# Patient Record
Sex: MALE | Race: WHITE | Employment: OTHER | URBAN - METROPOLITAN AREA
[De-identification: names, ages, dates, MRNs, and addresses within clinical notes are randomized per-mention and may not be internally consistent; named-entity substitution may affect disease eponyms.]

---

## 2017-01-01 ENCOUNTER — ANTI-COAG VISIT (OUTPATIENT)
Dept: PHARMACY | Facility: CLINIC | Age: 82
End: 2017-01-01

## 2017-01-17 ENCOUNTER — OFFICE VISIT (OUTPATIENT)
Dept: ORTHOPEDIC SURGERY | Age: 82
End: 2017-01-17

## 2017-01-17 VITALS — WEIGHT: 225 LBS | BODY MASS INDEX: 33.33 KG/M2 | HEIGHT: 69 IN

## 2017-01-17 DIAGNOSIS — L84 CALLUS OF FOOT: ICD-10-CM

## 2017-01-17 DIAGNOSIS — M79.671 RIGHT FOOT PAIN: Primary | ICD-10-CM

## 2017-01-17 PROCEDURE — G8419 CALC BMI OUT NRM PARAM NOF/U: HCPCS | Performed by: ORTHOPAEDIC SURGERY

## 2017-01-17 PROCEDURE — 99214 OFFICE O/P EST MOD 30 MIN: CPT | Performed by: ORTHOPAEDIC SURGERY

## 2017-01-17 PROCEDURE — 1123F ACP DISCUSS/DSCN MKR DOCD: CPT | Performed by: ORTHOPAEDIC SURGERY

## 2017-01-17 PROCEDURE — G8427 DOCREV CUR MEDS BY ELIG CLIN: HCPCS | Performed by: ORTHOPAEDIC SURGERY

## 2017-01-17 PROCEDURE — 4040F PNEUMOC VAC/ADMIN/RCVD: CPT | Performed by: ORTHOPAEDIC SURGERY

## 2017-01-17 PROCEDURE — 4004F PT TOBACCO SCREEN RCVD TLK: CPT | Performed by: ORTHOPAEDIC SURGERY

## 2017-01-17 PROCEDURE — G8484 FLU IMMUNIZE NO ADMIN: HCPCS | Performed by: ORTHOPAEDIC SURGERY

## 2017-01-23 ENCOUNTER — TELEPHONE (OUTPATIENT)
Dept: ORTHOPEDIC SURGERY | Age: 82
End: 2017-01-23

## 2017-01-24 ENCOUNTER — ORTHOTIC/BRACE ENCOUNTER (OUTPATIENT)
Dept: ORTHOPEDIC SURGERY | Age: 82
End: 2017-01-24

## 2017-02-01 ENCOUNTER — TELEPHONE (OUTPATIENT)
Dept: ORTHOPEDIC SURGERY | Age: 82
End: 2017-02-01

## 2017-02-07 ENCOUNTER — ORTHOTIC/BRACE ENCOUNTER (OUTPATIENT)
Dept: ORTHOPEDIC SURGERY | Age: 82
End: 2017-02-07

## 2017-02-07 DIAGNOSIS — E11.8 TYPE 2 DIABETES MELLITUS WITH COMPLICATION, WITHOUT LONG-TERM CURRENT USE OF INSULIN (HCC): Primary | ICD-10-CM

## 2017-02-07 PROCEDURE — A5500 DIAB SHOE FOR DENSITY INSERT: HCPCS | Performed by: PEDORTHIST

## 2017-02-07 PROCEDURE — A5513 MULTI DEN INSERT CUSTOM MOLD: HCPCS | Performed by: PEDORTHIST

## 2017-02-08 ENCOUNTER — HOSPITAL ENCOUNTER (OUTPATIENT)
Dept: SURGERY | Age: 82
Discharge: HOME | End: 2017-02-08
Attending: UROLOGY | Admitting: UROLOGY

## 2017-02-08 VITALS
WEIGHT: 225 LBS | HEIGHT: 69 IN | DIASTOLIC BLOOD PRESSURE: 67 MMHG | HEART RATE: 61 BPM | BODY MASS INDEX: 33.33 KG/M2 | RESPIRATION RATE: 20 BRPM | OXYGEN SATURATION: 96 % | SYSTOLIC BLOOD PRESSURE: 150 MMHG | TEMPERATURE: 97.8 F

## 2017-02-08 LAB
ALBUMIN SERPL-MCNC: 3.8 G/DL (ref 3.4–5)
ANION GAP SERPL CALCULATED.3IONS-SCNC: 12 MMOL/L (ref 3–16)
ANION GAP SERPL CALCULATED.3IONS-SCNC: 13 MMOL/L (ref 3–16)
BUN BLDV-MCNC: 28 MG/DL (ref 7–20)
BUN BLDV-MCNC: 29 MG/DL (ref 7–20)
CALCIUM SERPL-MCNC: 9.1 MG/DL (ref 8.3–10.6)
CALCIUM SERPL-MCNC: 9.5 MG/DL (ref 8.3–10.6)
CHLORIDE BLD-SCNC: 102 MMOL/L (ref 99–110)
CHLORIDE BLD-SCNC: 102 MMOL/L (ref 99–110)
CO2: 23 MMOL/L (ref 21–32)
CO2: 24 MMOL/L (ref 21–32)
CREAT SERPL-MCNC: 2.2 MG/DL (ref 0.8–1.3)
CREAT SERPL-MCNC: 2.2 MG/DL (ref 0.8–1.3)
GFR AFRICAN AMERICAN: 35
GFR AFRICAN AMERICAN: 35
GFR NON-AFRICAN AMERICAN: 29
GFR NON-AFRICAN AMERICAN: 29
GLUCOSE BLD-MCNC: 110 MG/DL (ref 70–99)
GLUCOSE BLD-MCNC: 118 MG/DL (ref 70–99)
GLUCOSE BLD-MCNC: 147 MG/DL (ref 70–99)
GLUCOSE BLD-MCNC: 52 MG/DL (ref 70–99)
HCT VFR BLD CALC: 32.4 % (ref 40.5–52.5)
HCT VFR BLD CALC: 34.9 % (ref 40.5–52.5)
HEMOGLOBIN: 10.5 G/DL (ref 13.5–17.5)
HEMOGLOBIN: 11.3 G/DL (ref 13.5–17.5)
MCH RBC QN AUTO: 28.6 PG (ref 26–34)
MCH RBC QN AUTO: 29.1 PG (ref 26–34)
MCHC RBC AUTO-ENTMCNC: 32.2 G/DL (ref 31–36)
MCHC RBC AUTO-ENTMCNC: 32.5 G/DL (ref 31–36)
MCV RBC AUTO: 88.7 FL (ref 80–100)
MCV RBC AUTO: 89.7 FL (ref 80–100)
PDW BLD-RTO: 16.6 % (ref 12.4–15.4)
PDW BLD-RTO: 16.7 % (ref 12.4–15.4)
PERFORMED ON: ABNORMAL
PERFORMED ON: ABNORMAL
PHOSPHORUS: 4 MG/DL (ref 2.5–4.9)
PLATELET # BLD: 109 K/UL (ref 135–450)
PLATELET # BLD: 91 K/UL (ref 135–450)
PMV BLD AUTO: 7.8 FL (ref 5–10.5)
PMV BLD AUTO: 7.9 FL (ref 5–10.5)
POTASSIUM SERPL-SCNC: 3.1 MMOL/L (ref 3.5–5.1)
POTASSIUM SERPL-SCNC: 4.2 MMOL/L (ref 3.5–5.1)
RBC # BLD: 3.61 M/UL (ref 4.2–5.9)
RBC # BLD: 3.94 M/UL (ref 4.2–5.9)
SODIUM BLD-SCNC: 137 MMOL/L (ref 136–145)
SODIUM BLD-SCNC: 139 MMOL/L (ref 136–145)
WBC # BLD: 12.6 K/UL (ref 4–11)
WBC # BLD: 9.3 K/UL (ref 4–11)

## 2017-02-08 PROCEDURE — 93010 ELECTROCARDIOGRAM REPORT: CPT | Performed by: INTERNAL MEDICINE

## 2017-02-08 RX ORDER — PHENAZOPYRIDINE HYDROCHLORIDE 200 MG/1
200 TABLET, FILM COATED ORAL 3 TIMES DAILY PRN
Qty: 15 TABLET | Refills: 0 | Status: SHIPPED | OUTPATIENT
Start: 2017-02-08 | End: 2017-02-13

## 2017-02-08 RX ORDER — SODIUM CHLORIDE, SODIUM LACTATE, POTASSIUM CHLORIDE, CALCIUM CHLORIDE 600; 310; 30; 20 MG/100ML; MG/100ML; MG/100ML; MG/100ML
INJECTION, SOLUTION INTRAVENOUS CONTINUOUS
Status: DISCONTINUED | OUTPATIENT
Start: 2017-02-08 | End: 2017-02-09 | Stop reason: HOSPADM

## 2017-02-08 RX ORDER — GENTAMICIN SULFATE 80 MG/100ML
80 INJECTION, SOLUTION INTRAVENOUS ONCE
Status: DISCONTINUED | OUTPATIENT
Start: 2017-02-08 | End: 2017-02-09 | Stop reason: HOSPADM

## 2017-02-08 RX ORDER — DEXTROSE MONOHYDRATE 25 G/50ML
INJECTION, SOLUTION INTRAVENOUS
Status: DISCONTINUED
Start: 2017-02-08 | End: 2017-02-09 | Stop reason: HOSPADM

## 2017-02-08 RX ORDER — SULFAMETHOXAZOLE AND TRIMETHOPRIM 400; 80 MG/1; MG/1
1 TABLET ORAL 2 TIMES DAILY
Qty: 8 TABLET | Refills: 0 | Status: SHIPPED | OUTPATIENT
Start: 2017-02-08 | End: 2017-02-12

## 2017-02-08 RX ORDER — DEXTROSE MONOHYDRATE 25 G/50ML
25 INJECTION, SOLUTION INTRAVENOUS ONCE
Status: COMPLETED | OUTPATIENT
Start: 2017-02-08 | End: 2017-02-08

## 2017-02-08 RX ORDER — LIDOCAINE HYDROCHLORIDE 10 MG/ML
1 INJECTION, SOLUTION EPIDURAL; INFILTRATION; INTRACAUDAL; PERINEURAL
Status: COMPLETED | OUTPATIENT
Start: 2017-02-08 | End: 2017-02-08

## 2017-02-08 RX ADMIN — LIDOCAINE HYDROCHLORIDE 1 ML: 10 INJECTION, SOLUTION EPIDURAL; INFILTRATION; INTRACAUDAL; PERINEURAL at 14:03

## 2017-02-08 RX ADMIN — DEXTROSE MONOHYDRATE 25 G: 25 INJECTION, SOLUTION INTRAVENOUS at 14:16

## 2017-02-08 RX ADMIN — SODIUM CHLORIDE, SODIUM LACTATE, POTASSIUM CHLORIDE, CALCIUM CHLORIDE: 600; 310; 30; 20 INJECTION, SOLUTION INTRAVENOUS at 14:03

## 2017-02-08 ASSESSMENT — PAIN - FUNCTIONAL ASSESSMENT: PAIN_FUNCTIONAL_ASSESSMENT: 0-10

## 2017-02-09 LAB
EKG ATRIAL RATE: 394 BPM
EKG DIAGNOSIS: NORMAL
EKG Q-T INTERVAL: 494 MS
EKG QRS DURATION: 112 MS
EKG QTC CALCULATION (BAZETT): 476 MS
EKG R AXIS: -28 DEGREES
EKG T AXIS: 53 DEGREES
EKG VENTRICULAR RATE: 56 BPM

## 2017-04-03 ENCOUNTER — OFFICE VISIT (OUTPATIENT)
Dept: ORTHOPEDIC SURGERY | Age: 82
End: 2017-04-03

## 2017-04-03 DIAGNOSIS — M17.11 PRIMARY OSTEOARTHRITIS OF RIGHT KNEE: Primary | ICD-10-CM

## 2017-04-03 PROCEDURE — 20611 DRAIN/INJ JOINT/BURSA W/US: CPT | Performed by: PHYSICIAN ASSISTANT

## 2017-04-03 PROCEDURE — 4004F PT TOBACCO SCREEN RCVD TLK: CPT | Performed by: PHYSICIAN ASSISTANT

## 2017-04-04 PROBLEM — I25.10 CAD (CORONARY ARTERY DISEASE): Status: ACTIVE | Noted: 2017-04-04

## 2017-04-04 PROBLEM — I10 HYPERTENSION: Status: ACTIVE | Noted: 2017-04-04

## 2017-04-04 PROBLEM — L03.119 CELLULITIS OF LOWER EXTREMITY: Status: ACTIVE | Noted: 2017-04-04

## 2017-04-04 PROBLEM — R09.89 PULMONARY VENOUS CONGESTION: Status: ACTIVE | Noted: 2017-04-04

## 2017-04-04 PROBLEM — N18.9 CKD (CHRONIC KIDNEY DISEASE): Status: ACTIVE | Noted: 2017-04-04

## 2017-04-04 PROBLEM — R00.1 BRADYCARDIA: Status: ACTIVE | Noted: 2017-04-04

## 2017-04-11 ENCOUNTER — HOSPITAL ENCOUNTER (OUTPATIENT)
Dept: WOUND CARE | Age: 82
Discharge: HOME | End: 2017-04-11
Attending: CLINICAL NURSE SPECIALIST | Admitting: CLINICAL NURSE SPECIALIST

## 2017-04-11 VITALS
SYSTOLIC BLOOD PRESSURE: 178 MMHG | BODY MASS INDEX: 31.92 KG/M2 | HEART RATE: 53 BPM | RESPIRATION RATE: 18 BRPM | HEIGHT: 71 IN | WEIGHT: 228 LBS | DIASTOLIC BLOOD PRESSURE: 64 MMHG | TEMPERATURE: 98.4 F

## 2017-04-11 DIAGNOSIS — L97.929 VENOUS ULCER OF LEFT LEG (HCC): ICD-10-CM

## 2017-04-11 DIAGNOSIS — I83.029 VENOUS ULCER OF LEFT LEG (HCC): ICD-10-CM

## 2017-04-11 DIAGNOSIS — L97.919 VENOUS ULCER OF RIGHT LEG (HCC): ICD-10-CM

## 2017-04-11 DIAGNOSIS — I83.019 VENOUS ULCER OF RIGHT LEG (HCC): ICD-10-CM

## 2017-04-11 PROCEDURE — 99202 OFFICE O/P NEW SF 15 MIN: CPT | Performed by: CLINICAL NURSE SPECIALIST

## 2017-04-15 PROBLEM — I83.019 VENOUS ULCER OF RIGHT LEG (HCC): Status: ACTIVE | Noted: 2017-04-15

## 2017-04-15 PROBLEM — L97.919 VENOUS ULCER OF RIGHT LEG (HCC): Status: ACTIVE | Noted: 2017-04-15

## 2017-04-15 PROBLEM — I83.029 VENOUS ULCER OF LEFT LEG (HCC): Status: ACTIVE | Noted: 2017-04-15

## 2017-04-15 PROBLEM — L97.929 VENOUS ULCER OF LEFT LEG (HCC): Status: ACTIVE | Noted: 2017-04-15

## 2017-04-18 ENCOUNTER — HOSPITAL ENCOUNTER (OUTPATIENT)
Dept: WOUND CARE | Age: 82
Discharge: HOME | End: 2017-04-18
Attending: CLINICAL NURSE SPECIALIST | Admitting: CLINICAL NURSE SPECIALIST

## 2017-04-18 VITALS
WEIGHT: 218.2 LBS | BODY MASS INDEX: 30.55 KG/M2 | TEMPERATURE: 96.8 F | HEART RATE: 58 BPM | SYSTOLIC BLOOD PRESSURE: 158 MMHG | DIASTOLIC BLOOD PRESSURE: 70 MMHG | HEIGHT: 71 IN | RESPIRATION RATE: 20 BRPM

## 2017-04-18 PROCEDURE — 99211 OFF/OP EST MAY X REQ PHY/QHP: CPT | Performed by: CLINICAL NURSE SPECIALIST

## 2017-04-18 ASSESSMENT — PAIN SCALES - GENERAL: PAINLEVEL_OUTOF10: 0

## 2017-05-15 ENCOUNTER — HOSPITAL ENCOUNTER (OUTPATIENT)
Dept: WOUND CARE | Age: 82
Discharge: HOME | End: 2017-05-15
Attending: PODIATRIST | Admitting: PODIATRIST

## 2017-05-15 DIAGNOSIS — I73.9 PERIPHERAL VASCULAR DISEASE (HCC): ICD-10-CM

## 2017-05-22 ENCOUNTER — HOSPITAL ENCOUNTER (OUTPATIENT)
Dept: WOUND CARE | Age: 82
Discharge: HOME | End: 2017-05-22
Attending: PODIATRIST | Admitting: PODIATRIST

## 2017-05-22 VITALS
DIASTOLIC BLOOD PRESSURE: 62 MMHG | RESPIRATION RATE: 16 BRPM | TEMPERATURE: 97.7 F | HEART RATE: 55 BPM | SYSTOLIC BLOOD PRESSURE: 149 MMHG | HEIGHT: 71 IN

## 2017-05-22 DIAGNOSIS — I83.019 VENOUS ULCER OF RIGHT LEG (HCC): ICD-10-CM

## 2017-05-22 DIAGNOSIS — I83.029 VENOUS ULCER OF LEFT LEG (HCC): ICD-10-CM

## 2017-05-22 DIAGNOSIS — L97.919 VENOUS ULCER OF RIGHT LEG (HCC): ICD-10-CM

## 2017-05-22 DIAGNOSIS — I73.9 PERIPHERAL VASCULAR DISEASE (HCC): ICD-10-CM

## 2017-05-22 DIAGNOSIS — L97.929 VENOUS ULCER OF LEFT LEG (HCC): ICD-10-CM

## 2017-05-30 ENCOUNTER — HOSPITAL ENCOUNTER (OUTPATIENT)
Dept: WOUND CARE | Age: 82
Discharge: HOME | End: 2017-05-30
Attending: PODIATRIST | Admitting: PODIATRIST

## 2017-05-30 VITALS
DIASTOLIC BLOOD PRESSURE: 61 MMHG | WEIGHT: 228.5 LBS | HEART RATE: 53 BPM | SYSTOLIC BLOOD PRESSURE: 145 MMHG | TEMPERATURE: 98.7 F | BODY MASS INDEX: 31.99 KG/M2 | RESPIRATION RATE: 16 BRPM | HEIGHT: 71 IN

## 2017-06-05 ENCOUNTER — HOSPITAL ENCOUNTER (OUTPATIENT)
Dept: WOUND CARE | Age: 82
Discharge: HOME | End: 2017-06-05
Attending: PODIATRIST | Admitting: PODIATRIST

## 2017-06-06 ENCOUNTER — HOSPITAL ENCOUNTER (OUTPATIENT)
Dept: WOUND CARE | Age: 82
Discharge: HOME | End: 2017-06-06
Attending: PODIATRIST | Admitting: PODIATRIST

## 2017-06-06 VITALS
HEART RATE: 74 BPM | HEIGHT: 71 IN | BODY MASS INDEX: 31.5 KG/M2 | RESPIRATION RATE: 18 BRPM | SYSTOLIC BLOOD PRESSURE: 154 MMHG | WEIGHT: 225 LBS | TEMPERATURE: 97.4 F | DIASTOLIC BLOOD PRESSURE: 68 MMHG

## 2017-06-06 ASSESSMENT — PAIN DESCRIPTION - DESCRIPTORS: DESCRIPTORS: ACHING

## 2017-06-06 ASSESSMENT — PAIN DESCRIPTION - LOCATION: LOCATION: LEG

## 2017-06-06 ASSESSMENT — PAIN DESCRIPTION - PROGRESSION: CLINICAL_PROGRESSION: NOT CHANGED

## 2017-06-06 ASSESSMENT — PAIN DESCRIPTION - FREQUENCY: FREQUENCY: INTERMITTENT

## 2017-06-06 ASSESSMENT — PAIN SCALES - GENERAL: PAINLEVEL_OUTOF10: 3

## 2017-06-06 ASSESSMENT — PAIN DESCRIPTION - PAIN TYPE: TYPE: CHRONIC PAIN

## 2017-06-06 ASSESSMENT — PAIN DESCRIPTION - ONSET: ONSET: ON-GOING

## 2017-06-06 ASSESSMENT — PAIN DESCRIPTION - ORIENTATION: ORIENTATION: RIGHT

## 2017-06-12 ENCOUNTER — HOSPITAL ENCOUNTER (OUTPATIENT)
Dept: WOUND CARE | Age: 82
Discharge: HOME | End: 2017-06-12
Attending: PODIATRIST | Admitting: PODIATRIST

## 2017-06-12 VITALS
RESPIRATION RATE: 18 BRPM | WEIGHT: 222.2 LBS | TEMPERATURE: 97 F | DIASTOLIC BLOOD PRESSURE: 64 MMHG | HEIGHT: 71 IN | BODY MASS INDEX: 31.11 KG/M2 | HEART RATE: 58 BPM | SYSTOLIC BLOOD PRESSURE: 155 MMHG

## 2017-09-26 PROBLEM — L84 CALLUS OF FOOT: Status: RESOLVED | Noted: 2017-01-17 | Resolved: 2017-09-26

## 2017-09-26 PROBLEM — R79.1 ELEVATED INR: Status: ACTIVE | Noted: 2017-09-26

## 2017-09-26 PROBLEM — N18.30 CKD (CHRONIC KIDNEY DISEASE) STAGE 3, GFR 30-59 ML/MIN (HCC): Chronic | Status: ACTIVE | Noted: 2017-04-04

## 2017-09-26 PROBLEM — Z87.891 FORMER SMOKER: Chronic | Status: ACTIVE | Noted: 2017-09-26

## 2017-09-26 PROBLEM — L03.119 CELLULITIS OF LOWER EXTREMITY: Status: RESOLVED | Noted: 2017-04-04 | Resolved: 2017-09-26

## 2017-09-26 PROBLEM — R79.89 ELEVATED BRAIN NATRIURETIC PEPTIDE (BNP) LEVEL: Status: ACTIVE | Noted: 2017-09-26

## 2017-09-26 PROBLEM — L97.929 VENOUS ULCER OF LEFT LEG (HCC): Status: RESOLVED | Noted: 2017-04-15 | Resolved: 2017-09-26

## 2017-09-26 PROBLEM — R79.1 ELEVATED INR: Chronic | Status: ACTIVE | Noted: 2017-09-26

## 2017-09-26 PROBLEM — N40.0 BPH (BENIGN PROSTATIC HYPERPLASIA): Chronic | Status: ACTIVE | Noted: 2017-09-26

## 2017-09-26 PROBLEM — I83.029 VENOUS ULCER OF LEFT LEG (HCC): Status: RESOLVED | Noted: 2017-04-15 | Resolved: 2017-09-26

## 2017-09-26 PROBLEM — R09.89 PULMONARY VENOUS CONGESTION: Status: RESOLVED | Noted: 2017-04-04 | Resolved: 2017-09-26

## 2017-09-26 PROBLEM — I10 HYPERTENSION: Chronic | Status: ACTIVE | Noted: 2017-04-04

## 2017-09-26 PROBLEM — I25.10 CAD (CORONARY ARTERY DISEASE): Chronic | Status: ACTIVE | Noted: 2017-04-04

## 2017-09-26 PROBLEM — I48.91 ATRIAL FIBRILLATION WITH SLOW VENTRICULAR RESPONSE (HCC): Status: ACTIVE | Noted: 2017-04-04

## 2017-09-26 PROBLEM — R77.8 ELEVATED TROPONIN: Status: ACTIVE | Noted: 2017-09-26

## 2017-09-26 PROBLEM — R29.6 FREQUENT FALLS: Chronic | Status: ACTIVE | Noted: 2017-09-26

## 2017-09-26 PROBLEM — L97.919 VENOUS ULCER OF RIGHT LEG (HCC): Chronic | Status: ACTIVE | Noted: 2017-04-15

## 2017-09-26 PROBLEM — Z96.642 HISTORY OF TOTAL LEFT HIP ARTHROPLASTY: Chronic | Status: ACTIVE | Noted: 2017-09-26

## 2017-09-26 PROBLEM — R73.9 ACUTE HYPERGLYCEMIA: Status: ACTIVE | Noted: 2017-09-26

## 2017-09-26 PROBLEM — K21.9 GERD (GASTROESOPHAGEAL REFLUX DISEASE): Chronic | Status: ACTIVE | Noted: 2017-09-26

## 2017-09-26 PROBLEM — E11.9 DM2 (DIABETES MELLITUS, TYPE 2) (HCC): Chronic | Status: ACTIVE | Noted: 2017-09-26

## 2017-09-26 PROBLEM — I48.20 CHRONIC ATRIAL FIBRILLATION (HCC): Chronic | Status: ACTIVE | Noted: 2017-09-26

## 2017-09-26 PROBLEM — R53.81 DEBILITY: Chronic | Status: ACTIVE | Noted: 2017-09-26

## 2017-09-26 PROBLEM — I83.019 VENOUS ULCER OF RIGHT LEG (HCC): Chronic | Status: ACTIVE | Noted: 2017-04-15

## 2017-09-27 PROBLEM — E11.22 TYPE 2 DIABETES MELLITUS WITH CHRONIC KIDNEY DISEASE, WITHOUT LONG-TERM CURRENT USE OF INSULIN (HCC): Chronic | Status: ACTIVE | Noted: 2017-09-26

## 2017-10-05 DIAGNOSIS — M25.552 LEFT HIP PAIN: Primary | ICD-10-CM

## 2017-10-05 PROCEDURE — 73502 X-RAY EXAM HIP UNI 2-3 VIEWS: CPT | Performed by: ORTHOPAEDIC SURGERY

## 2017-10-16 ENCOUNTER — HOSPITAL ENCOUNTER (OUTPATIENT)
Dept: MRI IMAGING | Age: 82
Discharge: HOME | End: 2017-10-16
Attending: ORTHOPAEDIC SURGERY | Admitting: ORTHOPAEDIC SURGERY

## 2017-10-16 DIAGNOSIS — D16.22 BENIGN NEOPLASM OF LONG BONE OF LEFT LOWER EXTREMITY: ICD-10-CM

## 2017-10-16 LAB
CREAT SERPL-MCNC: 1.8 MG/DL (ref 0.8–1.3)
GFR AFRICAN AMERICAN: 43
GFR NON-AFRICAN AMERICAN: 36

## 2017-10-18 ENCOUNTER — CARE COORDINATION (OUTPATIENT)
Dept: CASE MANAGEMENT | Age: 82
End: 2017-10-18

## 2017-11-17 ENCOUNTER — TELEPHONE (OUTPATIENT)
Dept: FAMILY MEDICINE CLINIC | Age: 82
End: 2017-11-17

## 2017-11-17 NOTE — TELEPHONE ENCOUNTER
[facility] agency on aging calling.   They are wanting to get verbal order for passport services once patient is discharged from the [address] [place] [address]

## 2018-01-06 PROBLEM — L89.95 INFECTED DECUBITUS ULCER, UNSTAGEABLE (HCC): Status: ACTIVE | Noted: 2018-01-06

## 2018-01-06 PROBLEM — L08.9 INFECTED DECUBITUS ULCER, UNSTAGEABLE (HCC): Status: ACTIVE | Noted: 2018-01-06

## 2018-01-08 PROBLEM — L03.317 CELLULITIS OF BUTTOCK: Status: ACTIVE | Noted: 2017-04-04

## 2018-04-12 PROBLEM — R77.8 ELEVATED TROPONIN: Status: RESOLVED | Noted: 2017-09-26 | Resolved: 2018-04-12

## 2018-07-06 ENCOUNTER — HOSPITAL ENCOUNTER (OUTPATIENT)
Dept: VASCULAR LAB | Age: 83
Discharge: HOME | End: 2018-07-06
Attending: FAMILY MEDICINE | Admitting: FAMILY MEDICINE

## 2018-07-06 DIAGNOSIS — I73.9 PERIPHERAL VASCULAR DISEASE (HCC): ICD-10-CM

## 2018-08-06 ENCOUNTER — HOSPITAL ENCOUNTER (OUTPATIENT)
Dept: NEUROLOGY | Age: 83
Discharge: HOME | End: 2018-08-06
Payer: MEDICARE

## 2018-08-06 PROCEDURE — 95909 NRV CNDJ TST 5-6 STUDIES: CPT

## 2018-08-06 PROCEDURE — 95886 MUSC TEST DONE W/N TEST COMP: CPT

## 2018-08-06 NOTE — PROCEDURES
Electrodiagnostic Report  [address] SANJEEV Otto MD, Michaele Goldberg, DO, Layvonne Mantel Salley Gilbert, MD  Phone: [phone]  Fax: [phone]    08/06/18    Gina Jacobs  80 y.o.  6/14/1931  [id number]  Referring Provider: Patricia Benitez MD    Clinical Problem:  80 y.o with history of bilateral leg pain, left more severe than right. Worse over past 3 months. PMH:  Left hip fracture one year ago. Patient has been non ambulatory since that time. + Diabetes, managed with insulin PE: reflexes absent, 2/5 ankle dorsiflexion, toe extension.     EMG SUMMARY TABLE RIGHT/LEFT LOWER       Spontaneous     MUAP   Recruitment    Insertional Activity Fibrillation Potential Positive Sharp Waves   Fasiculation High Frequency Potentials   Amp Duration PPP Pattern   Vastus Medialis L2-4 Femoral normal none none none none normal normal normal normal   Anterior Tibialis L4,5 Deep Peroneal normal none none none none normal normal normal normal   Gluteus Medius L4-S1 Superior Gut normal none none none none normal normal normal normal   Peroneus Longus L5-S1 Sup Peroneal normal none none none none normal normal normal normal   Posterior Tibialis L5-S1 Tibial normal none none none none normal normal normal normal   Medial Gastroc S1,2 Tibial Dec  none none none none increased normal normal Dec #   Extensor Hallicis T3-O4 Peroneal Dec  none none none none increased normal normal mkd dec #   Extensor Dig Brevis L5-S1 Peroneal Dec  none none none none increased normal normal mkd dec #   LS Paraspinals Posterior Rami       normal none none none none normal normal normal normal       Nerve Conduction Studies     Nerve Sensory Distal Latency (msec) Sensory Distal Latency (msec) Amp uv Amp uv Motor Distal Latency (msec) Motor Distal Latency (msec) Amp uv Amp uv Motor NCV (m/sec) Motor NCV (m/sec)    Right Left Right Left Right Left Right Left Right Left   Sural Abs (n<4.2) Abs (n<4.2)           Peroneal     Abs (n<5.5) Abs (n<5.5)   (n>40) (n>40)   Above Knee     4.5 4.9 0.5 0.3 35 (n>40) 35 (n>40)   Tibial     Abs (n<6.2) abs(n<6.2)   (n>40) (n>40)   H-Reflex                 Summary of EMG and Nerve Conduction Findings: The above EMG needle exam demonstrates decreased insertional activity in distal muscles of both lower limbs. Voluntary motor units are of increased amplitude and decreased number  Nerve conduction studies demonstrate absent motor and sensory evoked responses distally with small amplitude motor evoked responses to anterior tibialis with slowed motor conduction velocities. 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 No evidence of an acute radiculopathy or other lower motor neuron dysfunction. Thank you. Electronically signed by:  Reyna Drummond DO,8/6/2018,9:14 AM

## 2019-01-01 ENCOUNTER — APPOINTMENT (OUTPATIENT)
Dept: GENERAL RADIOLOGY | Age: 84
DRG: 603 | End: 2019-01-01
Payer: MEDICARE

## 2019-01-01 ENCOUNTER — APPOINTMENT (OUTPATIENT)
Dept: CT IMAGING | Age: 84
DRG: 871 | End: 2019-01-01
Payer: MEDICARE

## 2019-01-01 ENCOUNTER — APPOINTMENT (OUTPATIENT)
Dept: CT IMAGING | Age: 84
DRG: 228 | End: 2019-01-01
Payer: MEDICARE

## 2019-01-01 ENCOUNTER — ANESTHESIA (OUTPATIENT)
Dept: OPERATING ROOM | Age: 84
DRG: 871 | End: 2019-01-01
Payer: MEDICARE

## 2019-01-01 ENCOUNTER — ANESTHESIA EVENT (OUTPATIENT)
Dept: OPERATING ROOM | Age: 84
DRG: 871 | End: 2019-01-01
Payer: MEDICARE

## 2019-01-01 ENCOUNTER — HOSPITAL ENCOUNTER (INPATIENT)
Age: 84
LOS: 8 days | Discharge: HOSPICE | DRG: 228 | End: 2019-08-15
Attending: EMERGENCY MEDICINE | Admitting: INTERNAL MEDICINE
Payer: MEDICARE

## 2019-01-01 ENCOUNTER — APPOINTMENT (OUTPATIENT)
Dept: GENERAL RADIOLOGY | Age: 84
DRG: 683 | End: 2019-01-01
Payer: MEDICARE

## 2019-01-01 ENCOUNTER — OFFICE VISIT (OUTPATIENT)
Dept: ORTHOPEDIC SURGERY | Age: 84
End: 2019-01-01
Payer: MEDICARE

## 2019-01-01 ENCOUNTER — APPOINTMENT (OUTPATIENT)
Dept: GENERAL RADIOLOGY | Age: 84
DRG: 871 | End: 2019-01-01
Payer: MEDICARE

## 2019-01-01 ENCOUNTER — APPOINTMENT (OUTPATIENT)
Dept: CT IMAGING | Age: 84
DRG: 683 | End: 2019-01-01
Payer: MEDICARE

## 2019-01-01 ENCOUNTER — HOSPITAL ENCOUNTER (INPATIENT)
Age: 84
LOS: 4 days | Discharge: NURSING, REHAB OR LONG-TERM CARE | DRG: 603 | End: 2019-03-20
Attending: EMERGENCY MEDICINE | Admitting: FAMILY MEDICINE
Payer: MEDICARE

## 2019-01-01 ENCOUNTER — HOSPITAL ENCOUNTER (INPATIENT)
Age: 84
LOS: 9 days | Discharge: NURSING, REHAB OR LONG-TERM CARE | DRG: 871 | End: 2019-05-26
Attending: EMERGENCY MEDICINE | Admitting: INTERNAL MEDICINE
Payer: MEDICARE

## 2019-01-01 ENCOUNTER — NURSE ONLY (OUTPATIENT)
Dept: CARDIOLOGY CLINIC | Age: 84
End: 2019-01-01

## 2019-01-01 ENCOUNTER — APPOINTMENT (OUTPATIENT)
Dept: GENERAL RADIOLOGY | Age: 84
DRG: 228 | End: 2019-01-01
Payer: MEDICARE

## 2019-01-01 ENCOUNTER — HOSPITAL ENCOUNTER (INPATIENT)
Age: 84
LOS: 3 days | Discharge: NURSING, REHAB OR LONG-TERM CARE | DRG: 683 | End: 2019-02-22
Attending: EMERGENCY MEDICINE | Admitting: INTERNAL MEDICINE
Payer: MEDICARE

## 2019-01-01 ENCOUNTER — APPOINTMENT (OUTPATIENT)
Dept: CARDIAC CATH/INVASIVE PROCEDURES | Age: 84
DRG: 228 | End: 2019-01-01
Payer: MEDICARE

## 2019-01-01 ENCOUNTER — APPOINTMENT (OUTPATIENT)
Dept: MRI IMAGING | Age: 84
DRG: 603 | End: 2019-01-01
Payer: MEDICARE

## 2019-01-01 VITALS
TEMPERATURE: 98.2 F | BODY MASS INDEX: 31.15 KG/M2 | OXYGEN SATURATION: 95 % | HEIGHT: 72 IN | SYSTOLIC BLOOD PRESSURE: 123 MMHG | HEART RATE: 100 BPM | RESPIRATION RATE: 16 BRPM | DIASTOLIC BLOOD PRESSURE: 60 MMHG | WEIGHT: 230 LBS

## 2019-01-01 VITALS
RESPIRATION RATE: 16 BRPM | DIASTOLIC BLOOD PRESSURE: 81 MMHG | WEIGHT: 253.97 LBS | OXYGEN SATURATION: 95 % | SYSTOLIC BLOOD PRESSURE: 157 MMHG | HEART RATE: 63 BPM | TEMPERATURE: 97.9 F | HEIGHT: 73 IN | BODY MASS INDEX: 33.66 KG/M2

## 2019-01-01 VITALS
HEART RATE: 60 BPM | HEIGHT: 73 IN | OXYGEN SATURATION: 95 % | BODY MASS INDEX: 29.79 KG/M2 | DIASTOLIC BLOOD PRESSURE: 68 MMHG | WEIGHT: 224.8 LBS | RESPIRATION RATE: 16 BRPM | TEMPERATURE: 97.8 F | SYSTOLIC BLOOD PRESSURE: 126 MMHG

## 2019-01-01 VITALS
HEART RATE: 88 BPM | SYSTOLIC BLOOD PRESSURE: 135 MMHG | DIASTOLIC BLOOD PRESSURE: 83 MMHG | TEMPERATURE: 98.2 F | WEIGHT: 151.01 LBS | RESPIRATION RATE: 18 BRPM | OXYGEN SATURATION: 98 % | BODY MASS INDEX: 19.92 KG/M2

## 2019-01-01 VITALS
SYSTOLIC BLOOD PRESSURE: 132 MMHG | OXYGEN SATURATION: 95 % | DIASTOLIC BLOOD PRESSURE: 55 MMHG | RESPIRATION RATE: 18 BRPM

## 2019-01-01 DIAGNOSIS — N18.9 CHRONIC KIDNEY DISEASE, UNSPECIFIED CKD STAGE: ICD-10-CM

## 2019-01-01 DIAGNOSIS — S52.615A CLOSED NONDISPLACED FRACTURE OF STYLOID PROCESS OF LEFT ULNA, INITIAL ENCOUNTER: ICD-10-CM

## 2019-01-01 DIAGNOSIS — A41.9 SEPTICEMIA (HCC): ICD-10-CM

## 2019-01-01 DIAGNOSIS — S52.532A CLOSED COLLES' FRACTURE OF LEFT RADIUS, INITIAL ENCOUNTER: ICD-10-CM

## 2019-01-01 DIAGNOSIS — R41.82 ALTERED MENTAL STATUS, UNSPECIFIED ALTERED MENTAL STATUS TYPE: Primary | ICD-10-CM

## 2019-01-01 DIAGNOSIS — E16.2 HYPOGLYCEMIA: ICD-10-CM

## 2019-01-01 DIAGNOSIS — J18.9 PNEUMONIA DUE TO ORGANISM: Primary | ICD-10-CM

## 2019-01-01 DIAGNOSIS — S01.01XA LACERATION OF SCALP, INITIAL ENCOUNTER: ICD-10-CM

## 2019-01-01 DIAGNOSIS — S09.90XA INJURY OF HEAD, INITIAL ENCOUNTER: Primary | ICD-10-CM

## 2019-01-01 DIAGNOSIS — L03.116 CELLULITIS OF LEFT LOWER EXTREMITY: Primary | ICD-10-CM

## 2019-01-01 DIAGNOSIS — R00.1 BRADYCARDIA: ICD-10-CM

## 2019-01-01 DIAGNOSIS — T14.8XXA MULTIPLE SKIN TEARS: ICD-10-CM

## 2019-01-01 DIAGNOSIS — R77.8 ELEVATED TROPONIN: ICD-10-CM

## 2019-01-01 DIAGNOSIS — Z95.0 PACEMAKER: Primary | ICD-10-CM
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
## 2019-01-01 DIAGNOSIS — M25.532 WRIST PAIN, LEFT: Primary | ICD-10-CM

## 2019-01-01 DIAGNOSIS — R41.0 DELIRIUM: ICD-10-CM

## 2019-01-01 LAB
A/G RATIO: 1.1 (ref 1.1–2.2)
A/G RATIO: 1.2 (ref 1.1–2.2)
ALBUMIN SERPL-MCNC: 3.6 G/DL (ref 3.4–5)
ALBUMIN SERPL-MCNC: 3.7 G/DL (ref 3.4–5)
ALBUMIN SERPL-MCNC: 3.9 G/DL (ref 3.4–5)
ALBUMIN SERPL-MCNC: 4.1 G/DL (ref 3.4–5)
ALP BLD-CCNC: 109 U/L (ref 40–129)
ALP BLD-CCNC: 111 U/L (ref 40–129)
ALP BLD-CCNC: 112 U/L (ref 40–129)
ALP BLD-CCNC: 120 U/L (ref 40–129)
ALP BLD-CCNC: 88 U/L (ref 40–129)
ALT SERPL-CCNC: 11 U/L (ref 10–40)
ALT SERPL-CCNC: 11 U/L (ref 10–40)
ALT SERPL-CCNC: 12 U/L (ref 10–40)
ALT SERPL-CCNC: 12 U/L (ref 10–40)
ALT SERPL-CCNC: 8 U/L (ref 10–40)
AMMONIA: 21 UMOL/L (ref 16–60)
ANAEROBIC CULTURE: ABNORMAL
ANION GAP SERPL CALCULATED.3IONS-SCNC: 10 MMOL/L (ref 3–16)
ANION GAP SERPL CALCULATED.3IONS-SCNC: 11 MMOL/L (ref 3–16)
ANION GAP SERPL CALCULATED.3IONS-SCNC: 11 MMOL/L (ref 3–16)
ANION GAP SERPL CALCULATED.3IONS-SCNC: 12 MMOL/L (ref 3–16)
ANION GAP SERPL CALCULATED.3IONS-SCNC: 12 MMOL/L (ref 3–16)
ANION GAP SERPL CALCULATED.3IONS-SCNC: 13 MMOL/L (ref 3–16)
ANION GAP SERPL CALCULATED.3IONS-SCNC: 14 MMOL/L (ref 3–16)
ANION GAP SERPL CALCULATED.3IONS-SCNC: 14 MMOL/L (ref 3–16)
ANION GAP SERPL CALCULATED.3IONS-SCNC: 15 MMOL/L (ref 3–16)
ANION GAP SERPL CALCULATED.3IONS-SCNC: 18 MMOL/L (ref 3–16)
ANION GAP SERPL CALCULATED.3IONS-SCNC: 19 MMOL/L (ref 3–16)
ANION GAP SERPL CALCULATED.3IONS-SCNC: 7 MMOL/L (ref 3–16)
ANION GAP SERPL CALCULATED.3IONS-SCNC: 8 MMOL/L (ref 3–16)
ANION GAP SERPL CALCULATED.3IONS-SCNC: 9 MMOL/L (ref 3–16)
AST SERPL-CCNC: 14 U/L (ref 15–37)
AST SERPL-CCNC: 16 U/L (ref 15–37)
AST SERPL-CCNC: 17 U/L (ref 15–37)
AST SERPL-CCNC: 18 U/L (ref 15–37)
AST SERPL-CCNC: 18 U/L (ref 15–37)
BASOPHILS ABSOLUTE: 0 K/UL (ref 0–0.2)
BASOPHILS ABSOLUTE: 0.1 K/UL (ref 0–0.2)
BASOPHILS RELATIVE PERCENT: 0 %
BASOPHILS RELATIVE PERCENT: 0.3 %
BASOPHILS RELATIVE PERCENT: 0.3 %
BASOPHILS RELATIVE PERCENT: 0.5 %
BASOPHILS RELATIVE PERCENT: 0.6 %
BASOPHILS RELATIVE PERCENT: 0.6 %
BASOPHILS RELATIVE PERCENT: 0.7 %
BASOPHILS RELATIVE PERCENT: 0.8 %
BASOPHILS RELATIVE PERCENT: 0.9 %
BASOPHILS RELATIVE PERCENT: 1.1 %
BILIRUB SERPL-MCNC: 0.3 MG/DL (ref 0–1)
BILIRUB SERPL-MCNC: 0.5 MG/DL (ref 0–1)
BILIRUB SERPL-MCNC: 0.6 MG/DL (ref 0–1)
BILIRUB SERPL-MCNC: 0.6 MG/DL (ref 0–1)
BILIRUB SERPL-MCNC: 0.9 MG/DL (ref 0–1)
BILIRUBIN URINE: NEGATIVE
BILIRUBIN URINE: NEGATIVE
BLOOD CULTURE, ROUTINE: NORMAL
BLOOD CULTURE, ROUTINE: NORMAL
BLOOD, URINE: NEGATIVE
BLOOD, URINE: NEGATIVE
BUN BLDV-MCNC: 16 MG/DL (ref 7–20)
BUN BLDV-MCNC: 17 MG/DL (ref 7–20)
BUN BLDV-MCNC: 18 MG/DL (ref 7–20)
BUN BLDV-MCNC: 19 MG/DL (ref 7–20)
BUN BLDV-MCNC: 20 MG/DL (ref 7–20)
BUN BLDV-MCNC: 21 MG/DL (ref 7–20)
BUN BLDV-MCNC: 22 MG/DL (ref 7–20)
BUN BLDV-MCNC: 22 MG/DL (ref 7–20)
BUN BLDV-MCNC: 23 MG/DL (ref 7–20)
BUN BLDV-MCNC: 23 MG/DL (ref 7–20)
BUN BLDV-MCNC: 25 MG/DL (ref 7–20)
BUN BLDV-MCNC: 25 MG/DL (ref 7–20)
BUN BLDV-MCNC: 26 MG/DL (ref 7–20)
BUN BLDV-MCNC: 27 MG/DL (ref 7–20)
BUN BLDV-MCNC: 29 MG/DL (ref 7–20)
BUN BLDV-MCNC: 30 MG/DL (ref 7–20)
BUN BLDV-MCNC: 33 MG/DL (ref 7–20)
CALCIUM SERPL-MCNC: 8.7 MG/DL (ref 8.3–10.6)
CALCIUM SERPL-MCNC: 8.7 MG/DL (ref 8.3–10.6)
CALCIUM SERPL-MCNC: 8.8 MG/DL (ref 8.3–10.6)
CALCIUM SERPL-MCNC: 8.9 MG/DL (ref 8.3–10.6)
CALCIUM SERPL-MCNC: 8.9 MG/DL (ref 8.3–10.6)
CALCIUM SERPL-MCNC: 9 MG/DL (ref 8.3–10.6)
CALCIUM SERPL-MCNC: 9 MG/DL (ref 8.3–10.6)
CALCIUM SERPL-MCNC: 9.1 MG/DL (ref 8.3–10.6)
CALCIUM SERPL-MCNC: 9.2 MG/DL (ref 8.3–10.6)
CALCIUM SERPL-MCNC: 9.3 MG/DL (ref 8.3–10.6)
CALCIUM SERPL-MCNC: 9.3 MG/DL (ref 8.3–10.6)
CALCIUM SERPL-MCNC: 9.4 MG/DL (ref 8.3–10.6)
CALCIUM SERPL-MCNC: 9.4 MG/DL (ref 8.3–10.6)
CALCIUM SERPL-MCNC: 9.5 MG/DL (ref 8.3–10.6)
CALCIUM SERPL-MCNC: 9.5 MG/DL (ref 8.3–10.6)
CALCIUM SERPL-MCNC: 9.7 MG/DL (ref 8.3–10.6)
CALCIUM SERPL-MCNC: 9.8 MG/DL (ref 8.3–10.6)
CALCIUM SERPL-MCNC: 9.9 MG/DL (ref 8.3–10.6)
CHLORIDE BLD-SCNC: 100 MMOL/L (ref 99–110)
CHLORIDE BLD-SCNC: 101 MMOL/L (ref 99–110)
CHLORIDE BLD-SCNC: 102 MMOL/L (ref 99–110)
CHLORIDE BLD-SCNC: 103 MMOL/L (ref 99–110)
CHLORIDE BLD-SCNC: 104 MMOL/L (ref 99–110)
CHLORIDE BLD-SCNC: 105 MMOL/L (ref 99–110)
CHLORIDE BLD-SCNC: 106 MMOL/L (ref 99–110)
CHLORIDE BLD-SCNC: 108 MMOL/L (ref 99–110)
CHLORIDE BLD-SCNC: 109 MMOL/L (ref 99–110)
CHLORIDE BLD-SCNC: 98 MMOL/L (ref 99–110)
CLARITY: CLEAR
CLARITY: CLEAR
CO2: 17 MMOL/L (ref 21–32)
CO2: 17 MMOL/L (ref 21–32)
CO2: 19 MMOL/L (ref 21–32)
CO2: 20 MMOL/L (ref 21–32)
CO2: 21 MMOL/L (ref 21–32)
CO2: 23 MMOL/L (ref 21–32)
CO2: 23 MMOL/L (ref 21–32)
CO2: 24 MMOL/L (ref 21–32)
CO2: 25 MMOL/L (ref 21–32)
CO2: 25 MMOL/L (ref 21–32)
CO2: 26 MMOL/L (ref 21–32)
CO2: 27 MMOL/L (ref 21–32)
CO2: 28 MMOL/L (ref 21–32)
COLOR: YELLOW
COLOR: YELLOW
CREAT SERPL-MCNC: 1.5 MG/DL (ref 0.8–1.3)
CREAT SERPL-MCNC: 1.5 MG/DL (ref 0.8–1.3)
CREAT SERPL-MCNC: 1.6 MG/DL (ref 0.8–1.3)
CREAT SERPL-MCNC: 1.7 MG/DL (ref 0.8–1.3)
CREAT SERPL-MCNC: 1.8 MG/DL (ref 0.8–1.3)
CREAT SERPL-MCNC: 1.8 MG/DL (ref 0.8–1.3)
CREAT SERPL-MCNC: 1.9 MG/DL (ref 0.8–1.3)
CREAT SERPL-MCNC: 2 MG/DL (ref 0.8–1.3)
CREAT SERPL-MCNC: 2.1 MG/DL (ref 0.8–1.3)
CREAT SERPL-MCNC: 2.2 MG/DL (ref 0.8–1.3)
CULTURE, BLOOD 2: NORMAL
EKG ATRIAL RATE: 267 BPM
EKG ATRIAL RATE: 340 BPM
EKG ATRIAL RATE: 49 BPM
EKG ATRIAL RATE: 68 BPM
EKG ATRIAL RATE: 72 BPM
EKG DIAGNOSIS: NORMAL
EKG Q-T INTERVAL: 324 MS
EKG Q-T INTERVAL: 440 MS
EKG Q-T INTERVAL: 468 MS
EKG Q-T INTERVAL: 472 MS
EKG Q-T INTERVAL: 564 MS
EKG QRS DURATION: 108 MS
EKG QRS DURATION: 114 MS
EKG QRS DURATION: 120 MS
EKG QRS DURATION: 152 MS
EKG QRS DURATION: 88 MS
EKG QTC CALCULATION (BAZETT): 315 MS
EKG QTC CALCULATION (BAZETT): 411 MS
EKG QTC CALCULATION (BAZETT): 486 MS
EKG QTC CALCULATION (BAZETT): 505 MS
EKG QTC CALCULATION (BAZETT): 511 MS
EKG R AXIS: -30 DEGREES
EKG R AXIS: -31 DEGREES
EKG R AXIS: -34 DEGREES
EKG R AXIS: -41 DEGREES
EKG R AXIS: -6 DEGREES
EKG T AXIS: 13 DEGREES
EKG T AXIS: 179 DEGREES
EKG T AXIS: 27 DEGREES
EKG T AXIS: 47 DEGREES
EKG T AXIS: 60 DEGREES
EKG VENTRICULAR RATE: 32 BPM
EKG VENTRICULAR RATE: 57 BPM
EKG VENTRICULAR RATE: 65 BPM
EKG VENTRICULAR RATE: 69 BPM
EKG VENTRICULAR RATE: 81 BPM
EOSINOPHILS ABSOLUTE: 0 K/UL (ref 0–0.6)
EOSINOPHILS ABSOLUTE: 0.2 K/UL (ref 0–0.6)
EOSINOPHILS ABSOLUTE: 0.2 K/UL (ref 0–0.6)
EOSINOPHILS ABSOLUTE: 0.3 K/UL (ref 0–0.6)
EOSINOPHILS ABSOLUTE: 0.3 K/UL (ref 0–0.6)
EOSINOPHILS ABSOLUTE: 0.4 K/UL (ref 0–0.6)
EOSINOPHILS ABSOLUTE: 0.4 K/UL (ref 0–0.6)
EOSINOPHILS ABSOLUTE: 0.5 K/UL (ref 0–0.6)
EOSINOPHILS ABSOLUTE: 0.6 K/UL (ref 0–0.6)
EOSINOPHILS ABSOLUTE: 0.8 K/UL (ref 0–0.6)
EOSINOPHILS RELATIVE PERCENT: 0 %
EOSINOPHILS RELATIVE PERCENT: 1.8 %
EOSINOPHILS RELATIVE PERCENT: 10 %
EOSINOPHILS RELATIVE PERCENT: 11.7 %
EOSINOPHILS RELATIVE PERCENT: 2.5 %
EOSINOPHILS RELATIVE PERCENT: 3.2 %
EOSINOPHILS RELATIVE PERCENT: 3.6 %
EOSINOPHILS RELATIVE PERCENT: 5.4 %
EOSINOPHILS RELATIVE PERCENT: 6 %
EOSINOPHILS RELATIVE PERCENT: 6.3 %
EOSINOPHILS RELATIVE PERCENT: 6.7 %
EOSINOPHILS RELATIVE PERCENT: 9.1 %
EPITHELIAL CELLS, UA: ABNORMAL /HPF
ESTIMATED AVERAGE GLUCOSE: 139.9 MG/DL
ESTIMATED AVERAGE GLUCOSE: 205.9 MG/DL
ETHANOL: NORMAL MG/DL (ref 0–0.08)
GFR AFRICAN AMERICAN: 34
GFR AFRICAN AMERICAN: 36
GFR AFRICAN AMERICAN: 38
GFR AFRICAN AMERICAN: 41
GFR AFRICAN AMERICAN: 43
GFR AFRICAN AMERICAN: 43
GFR AFRICAN AMERICAN: 46
GFR AFRICAN AMERICAN: 50
GFR AFRICAN AMERICAN: 53
GFR AFRICAN AMERICAN: 53
GFR NON-AFRICAN AMERICAN: 28
GFR NON-AFRICAN AMERICAN: 30
GFR NON-AFRICAN AMERICAN: 32
GFR NON-AFRICAN AMERICAN: 34
GFR NON-AFRICAN AMERICAN: 36
GFR NON-AFRICAN AMERICAN: 36
GFR NON-AFRICAN AMERICAN: 38
GFR NON-AFRICAN AMERICAN: 41
GFR NON-AFRICAN AMERICAN: 44
GFR NON-AFRICAN AMERICAN: 44
GLOBULIN: 3.2 G/DL
GLOBULIN: 3.3 G/DL
GLUCOSE BLD-MCNC: 102 MG/DL (ref 70–99)
GLUCOSE BLD-MCNC: 102 MG/DL (ref 70–99)
GLUCOSE BLD-MCNC: 107 MG/DL (ref 70–99)
GLUCOSE BLD-MCNC: 110 MG/DL (ref 70–99)
GLUCOSE BLD-MCNC: 112 MG/DL (ref 70–99)
GLUCOSE BLD-MCNC: 114 MG/DL (ref 70–99)
GLUCOSE BLD-MCNC: 119 MG/DL (ref 70–99)
GLUCOSE BLD-MCNC: 119 MG/DL (ref 70–99)
GLUCOSE BLD-MCNC: 124 MG/DL (ref 70–99)
GLUCOSE BLD-MCNC: 126 MG/DL (ref 70–99)
GLUCOSE BLD-MCNC: 126 MG/DL (ref 70–99)
GLUCOSE BLD-MCNC: 127 MG/DL (ref 70–99)
GLUCOSE BLD-MCNC: 130 MG/DL (ref 70–99)
GLUCOSE BLD-MCNC: 132 MG/DL (ref 70–99)
GLUCOSE BLD-MCNC: 132 MG/DL (ref 70–99)
GLUCOSE BLD-MCNC: 133 MG/DL (ref 70–99)
GLUCOSE BLD-MCNC: 133 MG/DL (ref 70–99)
GLUCOSE BLD-MCNC: 134 MG/DL (ref 70–99)
GLUCOSE BLD-MCNC: 137 MG/DL (ref 70–99)
GLUCOSE BLD-MCNC: 148 MG/DL (ref 70–99)
GLUCOSE BLD-MCNC: 153 MG/DL (ref 70–99)
GLUCOSE BLD-MCNC: 154 MG/DL (ref 70–99)
GLUCOSE BLD-MCNC: 154 MG/DL (ref 70–99)
GLUCOSE BLD-MCNC: 156 MG/DL (ref 70–99)
GLUCOSE BLD-MCNC: 159 MG/DL (ref 70–99)
GLUCOSE BLD-MCNC: 159 MG/DL (ref 70–99)
GLUCOSE BLD-MCNC: 161 MG/DL (ref 70–99)
GLUCOSE BLD-MCNC: 162 MG/DL (ref 70–99)
GLUCOSE BLD-MCNC: 163 MG/DL (ref 70–99)
GLUCOSE BLD-MCNC: 163 MG/DL (ref 70–99)
GLUCOSE BLD-MCNC: 166 MG/DL (ref 70–99)
GLUCOSE BLD-MCNC: 167 MG/DL (ref 70–99)
GLUCOSE BLD-MCNC: 168 MG/DL (ref 70–99)
GLUCOSE BLD-MCNC: 169 MG/DL (ref 70–99)
GLUCOSE BLD-MCNC: 169 MG/DL (ref 70–99)
GLUCOSE BLD-MCNC: 170 MG/DL (ref 70–99)
GLUCOSE BLD-MCNC: 170 MG/DL (ref 70–99)
GLUCOSE BLD-MCNC: 171 MG/DL (ref 70–99)
GLUCOSE BLD-MCNC: 175 MG/DL (ref 70–99)
GLUCOSE BLD-MCNC: 176 MG/DL (ref 70–99)
GLUCOSE BLD-MCNC: 183 MG/DL (ref 70–99)
GLUCOSE BLD-MCNC: 185 MG/DL (ref 70–99)
GLUCOSE BLD-MCNC: 186 MG/DL (ref 70–99)
GLUCOSE BLD-MCNC: 188 MG/DL (ref 70–99)
GLUCOSE BLD-MCNC: 189 MG/DL (ref 70–99)
GLUCOSE BLD-MCNC: 189 MG/DL (ref 70–99)
GLUCOSE BLD-MCNC: 191 MG/DL (ref 70–99)
GLUCOSE BLD-MCNC: 192 MG/DL (ref 70–99)
GLUCOSE BLD-MCNC: 192 MG/DL (ref 70–99)
GLUCOSE BLD-MCNC: 193 MG/DL (ref 70–99)
GLUCOSE BLD-MCNC: 195 MG/DL (ref 70–99)
GLUCOSE BLD-MCNC: 195 MG/DL (ref 70–99)
GLUCOSE BLD-MCNC: 196 MG/DL (ref 70–99)
GLUCOSE BLD-MCNC: 196 MG/DL (ref 70–99)
GLUCOSE BLD-MCNC: 197 MG/DL (ref 70–99)
GLUCOSE BLD-MCNC: 198 MG/DL (ref 70–99)
GLUCOSE BLD-MCNC: 198 MG/DL (ref 70–99)
GLUCOSE BLD-MCNC: 203 MG/DL (ref 70–99)
GLUCOSE BLD-MCNC: 203 MG/DL (ref 70–99)
GLUCOSE BLD-MCNC: 205 MG/DL (ref 70–99)
GLUCOSE BLD-MCNC: 208 MG/DL (ref 70–99)
GLUCOSE BLD-MCNC: 208 MG/DL (ref 70–99)
GLUCOSE BLD-MCNC: 210 MG/DL (ref 70–99)
GLUCOSE BLD-MCNC: 211 MG/DL (ref 70–99)
GLUCOSE BLD-MCNC: 212 MG/DL (ref 70–99)
GLUCOSE BLD-MCNC: 227 MG/DL (ref 70–99)
GLUCOSE BLD-MCNC: 228 MG/DL (ref 70–99)
GLUCOSE BLD-MCNC: 233 MG/DL (ref 70–99)
GLUCOSE BLD-MCNC: 233 MG/DL (ref 70–99)
GLUCOSE BLD-MCNC: 235 MG/DL (ref 70–99)
GLUCOSE BLD-MCNC: 236 MG/DL (ref 70–99)
GLUCOSE BLD-MCNC: 236 MG/DL (ref 70–99)
GLUCOSE BLD-MCNC: 237 MG/DL (ref 70–99)
GLUCOSE BLD-MCNC: 238 MG/DL (ref 70–99)
GLUCOSE BLD-MCNC: 239 MG/DL (ref 70–99)
GLUCOSE BLD-MCNC: 240 MG/DL (ref 70–99)
GLUCOSE BLD-MCNC: 247 MG/DL (ref 70–99)
GLUCOSE BLD-MCNC: 249 MG/DL (ref 70–99)
GLUCOSE BLD-MCNC: 251 MG/DL (ref 70–99)
GLUCOSE BLD-MCNC: 251 MG/DL (ref 70–99)
GLUCOSE BLD-MCNC: 255 MG/DL (ref 70–99)
GLUCOSE BLD-MCNC: 261 MG/DL (ref 70–99)
GLUCOSE BLD-MCNC: 264 MG/DL (ref 70–99)
GLUCOSE BLD-MCNC: 267 MG/DL (ref 70–99)
GLUCOSE BLD-MCNC: 272 MG/DL (ref 70–99)
GLUCOSE BLD-MCNC: 273 MG/DL (ref 70–99)
GLUCOSE BLD-MCNC: 275 MG/DL (ref 70–99)
GLUCOSE BLD-MCNC: 279 MG/DL (ref 70–99)
GLUCOSE BLD-MCNC: 284 MG/DL (ref 70–99)
GLUCOSE BLD-MCNC: 288 MG/DL (ref 70–99)
GLUCOSE BLD-MCNC: 289 MG/DL (ref 70–99)
GLUCOSE BLD-MCNC: 293 MG/DL (ref 70–99)
GLUCOSE BLD-MCNC: 294 MG/DL (ref 70–99)
GLUCOSE BLD-MCNC: 300 MG/DL (ref 70–99)
GLUCOSE BLD-MCNC: 301 MG/DL (ref 70–99)
GLUCOSE BLD-MCNC: 308 MG/DL (ref 70–99)
GLUCOSE BLD-MCNC: 309 MG/DL (ref 70–99)
GLUCOSE BLD-MCNC: 320 MG/DL (ref 70–99)
GLUCOSE BLD-MCNC: 326 MG/DL (ref 70–99)
GLUCOSE BLD-MCNC: 336 MG/DL (ref 70–99)
GLUCOSE BLD-MCNC: 352 MG/DL (ref 70–99)
GLUCOSE BLD-MCNC: 51 MG/DL (ref 70–99)
GLUCOSE BLD-MCNC: 57 MG/DL (ref 70–99)
GLUCOSE BLD-MCNC: 57 MG/DL (ref 70–99)
GLUCOSE BLD-MCNC: 66 MG/DL (ref 70–99)
GLUCOSE BLD-MCNC: 67 MG/DL (ref 70–99)
GLUCOSE BLD-MCNC: 68 MG/DL (ref 70–99)
GLUCOSE BLD-MCNC: 70 MG/DL (ref 70–99)
GLUCOSE BLD-MCNC: 71 MG/DL (ref 70–99)
GLUCOSE BLD-MCNC: 73 MG/DL (ref 70–99)
GLUCOSE BLD-MCNC: 74 MG/DL (ref 70–99)
GLUCOSE BLD-MCNC: 74 MG/DL (ref 70–99)
GLUCOSE BLD-MCNC: 82 MG/DL (ref 70–99)
GLUCOSE BLD-MCNC: 84 MG/DL (ref 70–99)
GLUCOSE BLD-MCNC: 85 MG/DL (ref 70–99)
GLUCOSE BLD-MCNC: 87 MG/DL (ref 70–99)
GLUCOSE BLD-MCNC: 87 MG/DL (ref 70–99)
GLUCOSE BLD-MCNC: 88 MG/DL (ref 70–99)
GLUCOSE BLD-MCNC: 96 MG/DL (ref 70–99)
GLUCOSE BLD-MCNC: 98 MG/DL (ref 70–99)
GLUCOSE URINE: NEGATIVE MG/DL
GLUCOSE URINE: NEGATIVE MG/DL
GRAM STAIN RESULT: ABNORMAL
HBA1C MFR BLD: 6.5 %
HBA1C MFR BLD: 8.8 %
HCT VFR BLD CALC: 36.1 % (ref 40.5–52.5)
HCT VFR BLD CALC: 36.2 % (ref 40.5–52.5)
HCT VFR BLD CALC: 36.2 % (ref 40.5–52.5)
HCT VFR BLD CALC: 36.7 % (ref 40.5–52.5)
HCT VFR BLD CALC: 36.9 % (ref 40.5–52.5)
HCT VFR BLD CALC: 37.7 % (ref 40.5–52.5)
HCT VFR BLD CALC: 38.8 % (ref 40.5–52.5)
HCT VFR BLD CALC: 38.8 % (ref 40.5–52.5)
HCT VFR BLD CALC: 39.2 % (ref 40.5–52.5)
HCT VFR BLD CALC: 39.8 % (ref 40.5–52.5)
HCT VFR BLD CALC: 40.3 % (ref 40.5–52.5)
HCT VFR BLD CALC: 40.8 % (ref 40.5–52.5)
HCT VFR BLD CALC: 41.7 % (ref 40.5–52.5)
HCT VFR BLD CALC: 42.1 % (ref 40.5–52.5)
HCT VFR BLD CALC: 44 % (ref 40.5–52.5)
HEMOGLOBIN: 12 G/DL (ref 13.5–17.5)
HEMOGLOBIN: 12.3 G/DL (ref 13.5–17.5)
HEMOGLOBIN: 12.4 G/DL (ref 13.5–17.5)
HEMOGLOBIN: 12.4 G/DL (ref 13.5–17.5)
HEMOGLOBIN: 12.7 G/DL (ref 13.5–17.5)
HEMOGLOBIN: 12.9 G/DL (ref 13.5–17.5)
HEMOGLOBIN: 12.9 G/DL (ref 13.5–17.5)
HEMOGLOBIN: 13.1 G/DL (ref 13.5–17.5)
HEMOGLOBIN: 13.3 G/DL (ref 13.5–17.5)
HEMOGLOBIN: 13.6 G/DL (ref 13.5–17.5)
HEMOGLOBIN: 14 G/DL (ref 13.5–17.5)
HEMOGLOBIN: 14.1 G/DL (ref 13.5–17.5)
HEMOGLOBIN: 14.6 G/DL (ref 13.5–17.5)
INR BLD: 1.25 (ref 0.86–1.14)
KETONES, URINE: NEGATIVE MG/DL
KETONES, URINE: NEGATIVE MG/DL
LACTIC ACID: 0.8 MMOL/L (ref 0.4–2)
LACTIC ACID: 1 MMOL/L (ref 0.4–2)
LACTIC ACID: 1.2 MMOL/L (ref 0.4–2)
LEUKOCYTE ESTERASE, URINE: ABNORMAL
LEUKOCYTE ESTERASE, URINE: NEGATIVE
LIPASE: 15 U/L (ref 13–60)
LYMPHOCYTES ABSOLUTE: 1.3 K/UL (ref 1–5.1)
LYMPHOCYTES ABSOLUTE: 1.5 K/UL (ref 1–5.1)
LYMPHOCYTES ABSOLUTE: 1.7 K/UL (ref 1–5.1)
LYMPHOCYTES ABSOLUTE: 2 K/UL (ref 1–5.1)
LYMPHOCYTES ABSOLUTE: 2 K/UL (ref 1–5.1)
LYMPHOCYTES ABSOLUTE: 2.3 K/UL (ref 1–5.1)
LYMPHOCYTES ABSOLUTE: 2.4 K/UL (ref 1–5.1)
LYMPHOCYTES ABSOLUTE: 2.5 K/UL (ref 1–5.1)
LYMPHOCYTES ABSOLUTE: 2.6 K/UL (ref 1–5.1)
LYMPHOCYTES ABSOLUTE: 3.1 K/UL (ref 1–5.1)
LYMPHOCYTES RELATIVE PERCENT: 14.2 %
LYMPHOCYTES RELATIVE PERCENT: 15 %
LYMPHOCYTES RELATIVE PERCENT: 20.1 %
LYMPHOCYTES RELATIVE PERCENT: 24.2 %
LYMPHOCYTES RELATIVE PERCENT: 26.3 %
LYMPHOCYTES RELATIVE PERCENT: 26.9 %
LYMPHOCYTES RELATIVE PERCENT: 26.9 %
LYMPHOCYTES RELATIVE PERCENT: 28 %
LYMPHOCYTES RELATIVE PERCENT: 28.4 %
LYMPHOCYTES RELATIVE PERCENT: 32.1 %
LYMPHOCYTES RELATIVE PERCENT: 34.1 %
LYMPHOCYTES RELATIVE PERCENT: 44 %
MAGNESIUM: 2.1 MG/DL (ref 1.8–2.4)
MCH RBC QN AUTO: 30.2 PG (ref 26–34)
MCH RBC QN AUTO: 30.4 PG (ref 26–34)
MCH RBC QN AUTO: 30.5 PG (ref 26–34)
MCH RBC QN AUTO: 30.6 PG (ref 26–34)
MCH RBC QN AUTO: 30.7 PG (ref 26–34)
MCH RBC QN AUTO: 30.8 PG (ref 26–34)
MCH RBC QN AUTO: 30.8 PG (ref 26–34)
MCH RBC QN AUTO: 30.9 PG (ref 26–34)
MCH RBC QN AUTO: 31.1 PG (ref 26–34)
MCH RBC QN AUTO: 31.2 PG (ref 26–34)
MCH RBC QN AUTO: 31.3 PG (ref 26–34)
MCHC RBC AUTO-ENTMCNC: 32.8 G/DL (ref 31–36)
MCHC RBC AUTO-ENTMCNC: 32.8 G/DL (ref 31–36)
MCHC RBC AUTO-ENTMCNC: 33 G/DL (ref 31–36)
MCHC RBC AUTO-ENTMCNC: 33.3 G/DL (ref 31–36)
MCHC RBC AUTO-ENTMCNC: 33.3 G/DL (ref 31–36)
MCHC RBC AUTO-ENTMCNC: 33.4 G/DL (ref 31–36)
MCHC RBC AUTO-ENTMCNC: 33.4 G/DL (ref 31–36)
MCHC RBC AUTO-ENTMCNC: 33.5 G/DL (ref 31–36)
MCHC RBC AUTO-ENTMCNC: 34 G/DL (ref 31–36)
MCHC RBC AUTO-ENTMCNC: 34.2 G/DL (ref 31–36)
MCV RBC AUTO: 90.7 FL (ref 80–100)
MCV RBC AUTO: 91.3 FL (ref 80–100)
MCV RBC AUTO: 91.3 FL (ref 80–100)
MCV RBC AUTO: 91.7 FL (ref 80–100)
MCV RBC AUTO: 92 FL (ref 80–100)
MCV RBC AUTO: 92.1 FL (ref 80–100)
MCV RBC AUTO: 92.2 FL (ref 80–100)
MCV RBC AUTO: 92.3 FL (ref 80–100)
MCV RBC AUTO: 92.8 FL (ref 80–100)
MCV RBC AUTO: 92.8 FL (ref 80–100)
MCV RBC AUTO: 93.2 FL (ref 80–100)
MCV RBC AUTO: 93.3 FL (ref 80–100)
MCV RBC AUTO: 93.5 FL (ref 80–100)
MCV RBC AUTO: 93.6 FL (ref 80–100)
MCV RBC AUTO: 93.9 FL (ref 80–100)
MICROSCOPIC EXAMINATION: YES
MICROSCOPIC EXAMINATION: YES
MONOCYTES ABSOLUTE: 0.7 K/UL (ref 0–1.3)
MONOCYTES ABSOLUTE: 0.8 K/UL (ref 0–1.3)
MONOCYTES ABSOLUTE: 0.9 K/UL (ref 0–1.3)
MONOCYTES ABSOLUTE: 0.9 K/UL (ref 0–1.3)
MONOCYTES ABSOLUTE: 1 K/UL (ref 0–1.3)
MONOCYTES ABSOLUTE: 1.1 K/UL (ref 0–1.3)
MONOCYTES ABSOLUTE: 1.1 K/UL (ref 0–1.3)
MONOCYTES ABSOLUTE: 1.7 K/UL (ref 0–1.3)
MONOCYTES RELATIVE PERCENT: 10 %
MONOCYTES RELATIVE PERCENT: 10.7 %
MONOCYTES RELATIVE PERCENT: 11 %
MONOCYTES RELATIVE PERCENT: 11.2 %
MONOCYTES RELATIVE PERCENT: 11.3 %
MONOCYTES RELATIVE PERCENT: 11.7 %
MONOCYTES RELATIVE PERCENT: 12 %
MONOCYTES RELATIVE PERCENT: 12 %
MONOCYTES RELATIVE PERCENT: 12.4 %
MONOCYTES RELATIVE PERCENT: 13.2 %
MONOCYTES RELATIVE PERCENT: 13.4 %
MONOCYTES RELATIVE PERCENT: 9.9 %
MRSA SCREEN RT-PCR: ABNORMAL
MRSA SCREEN RT-PCR: ABNORMAL
MUCUS: ABNORMAL /LPF
NEUTROPHILS ABSOLUTE: 12.8 K/UL (ref 1.7–7.7)
NEUTROPHILS ABSOLUTE: 2.7 K/UL (ref 1.7–7.7)
NEUTROPHILS ABSOLUTE: 2.8 K/UL (ref 1.7–7.7)
NEUTROPHILS ABSOLUTE: 3.1 K/UL (ref 1.7–7.7)
NEUTROPHILS ABSOLUTE: 3.3 K/UL (ref 1.7–7.7)
NEUTROPHILS ABSOLUTE: 3.7 K/UL (ref 1.7–7.7)
NEUTROPHILS ABSOLUTE: 3.9 K/UL (ref 1.7–7.7)
NEUTROPHILS ABSOLUTE: 5 K/UL (ref 1.7–7.7)
NEUTROPHILS ABSOLUTE: 5 K/UL (ref 1.7–7.7)
NEUTROPHILS ABSOLUTE: 5.1 K/UL (ref 1.7–7.7)
NEUTROPHILS ABSOLUTE: 6 K/UL (ref 1.7–7.7)
NEUTROPHILS ABSOLUTE: 6.4 K/UL (ref 1.7–7.7)
NEUTROPHILS RELATIVE PERCENT: 37.5 %
NEUTROPHILS RELATIVE PERCENT: 44.3 %
NEUTROPHILS RELATIVE PERCENT: 47 %
NEUTROPHILS RELATIVE PERCENT: 48.1 %
NEUTROPHILS RELATIVE PERCENT: 51.9 %
NEUTROPHILS RELATIVE PERCENT: 52.6 %
NEUTROPHILS RELATIVE PERCENT: 55.5 %
NEUTROPHILS RELATIVE PERCENT: 57 %
NEUTROPHILS RELATIVE PERCENT: 62.4 %
NEUTROPHILS RELATIVE PERCENT: 65.1 %
NEUTROPHILS RELATIVE PERCENT: 71.1 %
NEUTROPHILS RELATIVE PERCENT: 75 %
NITRITE, URINE: NEGATIVE
NITRITE, URINE: NEGATIVE
ORGANISM: ABNORMAL
ORGANISM: ABNORMAL
PDW BLD-RTO: 14.7 % (ref 12.4–15.4)
PDW BLD-RTO: 14.7 % (ref 12.4–15.4)
PDW BLD-RTO: 14.8 % (ref 12.4–15.4)
PDW BLD-RTO: 14.8 % (ref 12.4–15.4)
PDW BLD-RTO: 14.9 % (ref 12.4–15.4)
PDW BLD-RTO: 15 % (ref 12.4–15.4)
PDW BLD-RTO: 15 % (ref 12.4–15.4)
PDW BLD-RTO: 15.1 % (ref 12.4–15.4)
PDW BLD-RTO: 15.2 % (ref 12.4–15.4)
PDW BLD-RTO: 15.6 % (ref 12.4–15.4)
PERFORMED ON: ABNORMAL
PERFORMED ON: NORMAL
PH UA: 7 (ref 5–8)
PH UA: 7 (ref 5–8)
PHOSPHORUS: 2.5 MG/DL (ref 2.5–4.9)
PHOSPHORUS: 2.7 MG/DL (ref 2.5–4.9)
PLATELET # BLD: 102 K/UL (ref 135–450)
PLATELET # BLD: 104 K/UL (ref 135–450)
PLATELET # BLD: 104 K/UL (ref 135–450)
PLATELET # BLD: 118 K/UL (ref 135–450)
PLATELET # BLD: 119 K/UL (ref 135–450)
PLATELET # BLD: 119 K/UL (ref 135–450)
PLATELET # BLD: 120 K/UL (ref 135–450)
PLATELET # BLD: 124 K/UL (ref 135–450)
PLATELET # BLD: 124 K/UL (ref 135–450)
PLATELET # BLD: 125 K/UL (ref 135–450)
PLATELET # BLD: 130 K/UL (ref 135–450)
PLATELET # BLD: 133 K/UL (ref 135–450)
PLATELET # BLD: 138 K/UL (ref 135–450)
PLATELET # BLD: 141 K/UL (ref 135–450)
PLATELET # BLD: 166 K/UL (ref 135–450)
PMV BLD AUTO: 7 FL (ref 5–10.5)
PMV BLD AUTO: 7.1 FL (ref 5–10.5)
PMV BLD AUTO: 7.2 FL (ref 5–10.5)
PMV BLD AUTO: 7.2 FL (ref 5–10.5)
PMV BLD AUTO: 7.3 FL (ref 5–10.5)
PMV BLD AUTO: 7.4 FL (ref 5–10.5)
PMV BLD AUTO: 7.5 FL (ref 5–10.5)
PMV BLD AUTO: 7.7 FL (ref 5–10.5)
PMV BLD AUTO: 8.3 FL (ref 5–10.5)
POIKILOCYTES: ABNORMAL
POTASSIUM REFLEX MAGNESIUM: 3.7 MMOL/L (ref 3.5–5.1)
POTASSIUM REFLEX MAGNESIUM: 3.8 MMOL/L (ref 3.5–5.1)
POTASSIUM REFLEX MAGNESIUM: 3.9 MMOL/L (ref 3.5–5.1)
POTASSIUM REFLEX MAGNESIUM: 4 MMOL/L (ref 3.5–5.1)
POTASSIUM REFLEX MAGNESIUM: 4.1 MMOL/L (ref 3.5–5.1)
POTASSIUM REFLEX MAGNESIUM: 4.1 MMOL/L (ref 3.5–5.1)
POTASSIUM REFLEX MAGNESIUM: 4.5 MMOL/L (ref 3.5–5.1)
POTASSIUM SERPL-SCNC: 3.2 MMOL/L (ref 3.5–5.1)
POTASSIUM SERPL-SCNC: 3.5 MMOL/L (ref 3.5–5.1)
POTASSIUM SERPL-SCNC: 3.6 MMOL/L (ref 3.5–5.1)
POTASSIUM SERPL-SCNC: 3.6 MMOL/L (ref 3.5–5.1)
POTASSIUM SERPL-SCNC: 3.7 MMOL/L (ref 3.5–5.1)
POTASSIUM SERPL-SCNC: 3.8 MMOL/L (ref 3.5–5.1)
POTASSIUM SERPL-SCNC: 4.1 MMOL/L (ref 3.5–5.1)
POTASSIUM SERPL-SCNC: 4.3 MMOL/L (ref 3.5–5.1)
PRO-BNP: 1346 PG/ML (ref 0–449)
PRO-BNP: 736 PG/ML (ref 0–449)
PROTEIN UA: 30 MG/DL
PROTEIN UA: NEGATIVE MG/DL
PROTHROMBIN TIME: 14.3 SEC (ref 9.8–13)
RBC # BLD: 3.93 M/UL (ref 4.2–5.9)
RBC # BLD: 3.94 M/UL (ref 4.2–5.9)
RBC # BLD: 3.94 M/UL (ref 4.2–5.9)
RBC # BLD: 3.97 M/UL (ref 4.2–5.9)
RBC # BLD: 4.04 M/UL (ref 4.2–5.9)
RBC # BLD: 4.04 M/UL (ref 4.2–5.9)
RBC # BLD: 4.13 M/UL (ref 4.2–5.9)
RBC # BLD: 4.22 M/UL (ref 4.2–5.9)
RBC # BLD: 4.27 M/UL (ref 4.2–5.9)
RBC # BLD: 4.31 M/UL (ref 4.2–5.9)
RBC # BLD: 4.35 M/UL (ref 4.2–5.9)
RBC # BLD: 4.42 M/UL (ref 4.2–5.9)
RBC # BLD: 4.5 M/UL (ref 4.2–5.9)
RBC # BLD: 4.52 M/UL (ref 4.2–5.9)
RBC # BLD: 4.71 M/UL (ref 4.2–5.9)
RBC UA: ABNORMAL /HPF (ref 0–2)
RBC UA: ABNORMAL /HPF (ref 0–2)
RENAL EPITHELIAL, UA: ABNORMAL /HPF
SLIDE REVIEW: ABNORMAL
SODIUM BLD-SCNC: 133 MMOL/L (ref 136–145)
SODIUM BLD-SCNC: 134 MMOL/L (ref 136–145)
SODIUM BLD-SCNC: 134 MMOL/L (ref 136–145)
SODIUM BLD-SCNC: 135 MMOL/L (ref 136–145)
SODIUM BLD-SCNC: 136 MMOL/L (ref 136–145)
SODIUM BLD-SCNC: 137 MMOL/L (ref 136–145)
SODIUM BLD-SCNC: 137 MMOL/L (ref 136–145)
SODIUM BLD-SCNC: 138 MMOL/L (ref 136–145)
SODIUM BLD-SCNC: 140 MMOL/L (ref 136–145)
SODIUM BLD-SCNC: 142 MMOL/L (ref 136–145)
SPECIFIC GRAVITY UA: 1.01 (ref 1–1.03)
SPECIFIC GRAVITY UA: <=1.005 (ref 1–1.03)
TOTAL PROTEIN: 6.8 G/DL (ref 6.4–8.2)
TOTAL PROTEIN: 6.9 G/DL (ref 6.4–8.2)
TOTAL PROTEIN: 6.9 G/DL (ref 6.4–8.2)
TOTAL PROTEIN: 7.1 G/DL (ref 6.4–8.2)
TOTAL PROTEIN: 7.4 G/DL (ref 6.4–8.2)
TROPONIN: 0.01 NG/ML
TROPONIN: 0.02 NG/ML
TROPONIN: 0.03 NG/ML
TROPONIN: 0.04 NG/ML
TROPONIN: 0.04 NG/ML
TSH REFLEX: 2.06 UIU/ML (ref 0.27–4.2)
URINE CULTURE, ROUTINE: NORMAL
URINE REFLEX TO CULTURE: ABNORMAL
URINE REFLEX TO CULTURE: YES
URINE TYPE: ABNORMAL
URINE TYPE: ABNORMAL
UROBILINOGEN, URINE: 0.2 E.U./DL
UROBILINOGEN, URINE: 0.2 E.U./DL
VANCOMYCIN RANDOM: 12.4 UG/ML
VANCOMYCIN RANDOM: 14.1 UG/ML
VANCOMYCIN RANDOM: 15.3 UG/ML
VANCOMYCIN RANDOM: 15.5 UG/ML
VANCOMYCIN RANDOM: 15.7 UG/ML
VANCOMYCIN RANDOM: 17.2 UG/ML
VANCOMYCIN RANDOM: 18.3 UG/ML
VANCOMYCIN RANDOM: 18.7 UG/ML
VANCOMYCIN TROUGH: 14 UG/ML (ref 10–20)
VANCOMYCIN TROUGH: 9.9 UG/ML (ref 10–20)
WBC # BLD: 17 K/UL (ref 4–11)
WBC # BLD: 5.6 K/UL (ref 4–11)
WBC # BLD: 5.9 K/UL (ref 4–11)
WBC # BLD: 7 K/UL (ref 4–11)
WBC # BLD: 7.1 K/UL (ref 4–11)
WBC # BLD: 7.4 K/UL (ref 4–11)
WBC # BLD: 7.6 K/UL (ref 4–11)
WBC # BLD: 7.8 K/UL (ref 4–11)
WBC # BLD: 8.5 K/UL (ref 4–11)
WBC # BLD: 8.8 K/UL (ref 4–11)
WBC # BLD: 9 K/UL (ref 4–11)
WBC # BLD: 9.1 K/UL (ref 4–11)
WBC # BLD: 9.6 K/UL (ref 4–11)
WBC UA: ABNORMAL /HPF (ref 0–5)
WBC UA: ABNORMAL /HPF (ref 0–5)
WOUND/ABSCESS: ABNORMAL

## 2019-01-01 PROCEDURE — 71045 X-RAY EXAM CHEST 1 VIEW: CPT

## 2019-01-01 PROCEDURE — 97535 SELF CARE MNGMENT TRAINING: CPT

## 2019-01-01 PROCEDURE — 99285 EMERGENCY DEPT VISIT HI MDM: CPT

## 2019-01-01 PROCEDURE — 6370000000 HC RX 637 (ALT 250 FOR IP): Performed by: INTERNAL MEDICINE

## 2019-01-01 PROCEDURE — 97530 THERAPEUTIC ACTIVITIES: CPT

## 2019-01-01 PROCEDURE — 85025 COMPLETE CBC W/AUTO DIFF WBC: CPT

## 2019-01-01 PROCEDURE — 6360000002 HC RX W HCPCS: Performed by: INTERNAL MEDICINE

## 2019-01-01 PROCEDURE — C1786 PMKR, SINGLE, RATE-RESP: HCPCS

## 2019-01-01 PROCEDURE — 93010 ELECTROCARDIOGRAM REPORT: CPT | Performed by: INTERNAL MEDICINE

## 2019-01-01 PROCEDURE — 2580000003 HC RX 258

## 2019-01-01 PROCEDURE — 99232 SBSQ HOSP IP/OBS MODERATE 35: CPT | Performed by: INTERNAL MEDICINE

## 2019-01-01 PROCEDURE — 80053 COMPREHEN METABOLIC PANEL: CPT

## 2019-01-01 PROCEDURE — 99024 POSTOP FOLLOW-UP VISIT: CPT | Performed by: SURGERY

## 2019-01-01 PROCEDURE — 2580000003 HC RX 258: Performed by: INTERNAL MEDICINE

## 2019-01-01 PROCEDURE — 1200000000 HC SEMI PRIVATE

## 2019-01-01 PROCEDURE — 2580000003 HC RX 258: Performed by: NURSE ANESTHETIST, CERTIFIED REGISTERED

## 2019-01-01 PROCEDURE — 93005 ELECTROCARDIOGRAM TRACING: CPT | Performed by: NURSE PRACTITIONER

## 2019-01-01 PROCEDURE — 97162 PT EVAL MOD COMPLEX 30 MIN: CPT

## 2019-01-01 PROCEDURE — 6370000000 HC RX 637 (ALT 250 FOR IP): Performed by: FAMILY MEDICINE

## 2019-01-01 PROCEDURE — 6360000002 HC RX W HCPCS: Performed by: NURSE PRACTITIONER

## 2019-01-01 PROCEDURE — 97110 THERAPEUTIC EXERCISES: CPT

## 2019-01-01 PROCEDURE — 93005 ELECTROCARDIOGRAM TRACING: CPT | Performed by: INTERNAL MEDICINE

## 2019-01-01 PROCEDURE — 80202 ASSAY OF VANCOMYCIN: CPT

## 2019-01-01 PROCEDURE — 83605 ASSAY OF LACTIC ACID: CPT

## 2019-01-01 PROCEDURE — 2580000003 HC RX 258: Performed by: PHYSICIAN ASSISTANT

## 2019-01-01 PROCEDURE — 36415 COLL VENOUS BLD VENIPUNCTURE: CPT

## 2019-01-01 PROCEDURE — 87205 SMEAR GRAM STAIN: CPT

## 2019-01-01 PROCEDURE — 71250 CT THORAX DX C-: CPT

## 2019-01-01 PROCEDURE — 81001 URINALYSIS AUTO W/SCOPE: CPT

## 2019-01-01 PROCEDURE — 6370000000 HC RX 637 (ALT 250 FOR IP): Performed by: EMERGENCY MEDICINE

## 2019-01-01 PROCEDURE — 70450 CT HEAD/BRAIN W/O DYE: CPT

## 2019-01-01 PROCEDURE — 99231 SBSQ HOSP IP/OBS SF/LOW 25: CPT | Performed by: SURGERY

## 2019-01-01 PROCEDURE — 2580000003 HC RX 258: Performed by: NURSE PRACTITIONER

## 2019-01-01 PROCEDURE — 0X940ZZ DRAINAGE OF RIGHT AXILLA, OPEN APPROACH: ICD-10-PCS | Performed by: SURGERY

## 2019-01-01 PROCEDURE — 97166 OT EVAL MOD COMPLEX 45 MIN: CPT

## 2019-01-01 PROCEDURE — 94761 N-INVAS EAR/PLS OXIMETRY MLT: CPT

## 2019-01-01 PROCEDURE — 2500000003 HC RX 250 WO HCPCS

## 2019-01-01 PROCEDURE — 92526 ORAL FUNCTION THERAPY: CPT

## 2019-01-01 PROCEDURE — 80048 BASIC METABOLIC PNL TOTAL CA: CPT

## 2019-01-01 PROCEDURE — 6370000000 HC RX 637 (ALT 250 FOR IP): Performed by: STUDENT IN AN ORGANIZED HEALTH CARE EDUCATION/TRAINING PROGRAM

## 2019-01-01 PROCEDURE — 3700000001 HC ADD 15 MINUTES (ANESTHESIA): Performed by: SURGERY

## 2019-01-01 PROCEDURE — 6370000000 HC RX 637 (ALT 250 FOR IP): Performed by: PHYSICIAN ASSISTANT

## 2019-01-01 PROCEDURE — 6360000002 HC RX W HCPCS: Performed by: SURGERY

## 2019-01-01 PROCEDURE — 87075 CULTR BACTERIA EXCEPT BLOOD: CPT

## 2019-01-01 PROCEDURE — 02HK3NZ INSERTION OF INTRACARDIAC PACEMAKER INTO RIGHT VENTRICLE, PERCUTANEOUS APPROACH: ICD-10-PCS | Performed by: INTERNAL MEDICINE

## 2019-01-01 PROCEDURE — 4500000025 HC ED LEVEL 5 PROCEDURE

## 2019-01-01 PROCEDURE — L3908 WHO COCK-UP NONMOLDE PRE OTS: HCPCS | Performed by: ORTHOPAEDIC SURGERY

## 2019-01-01 PROCEDURE — 4004F PT TOBACCO SCREEN RCVD TLK: CPT | Performed by: ORTHOPAEDIC SURGERY

## 2019-01-01 PROCEDURE — 83880 ASSAY OF NATRIURETIC PEPTIDE: CPT

## 2019-01-01 PROCEDURE — 99291 CRITICAL CARE FIRST HOUR: CPT

## 2019-01-01 PROCEDURE — C1769 GUIDE WIRE: HCPCS

## 2019-01-01 PROCEDURE — 2060000000 HC ICU INTERMEDIATE R&B

## 2019-01-01 PROCEDURE — 90686 IIV4 VACC NO PRSV 0.5 ML IM: CPT | Performed by: INTERNAL MEDICINE

## 2019-01-01 PROCEDURE — 96365 THER/PROPH/DIAG IV INF INIT: CPT

## 2019-01-01 PROCEDURE — 83735 ASSAY OF MAGNESIUM: CPT

## 2019-01-01 PROCEDURE — 6360000002 HC RX W HCPCS: Performed by: FAMILY MEDICINE

## 2019-01-01 PROCEDURE — G0480 DRUG TEST DEF 1-7 CLASSES: HCPCS

## 2019-01-01 PROCEDURE — 2580000003 HC RX 258: Performed by: FAMILY MEDICINE

## 2019-01-01 PROCEDURE — 6360000002 HC RX W HCPCS: Performed by: NURSE ANESTHETIST, CERTIFIED REGISTERED

## 2019-01-01 PROCEDURE — 6360000002 HC RX W HCPCS: Performed by: PHYSICIAN ASSISTANT

## 2019-01-01 PROCEDURE — 87077 CULTURE AEROBIC IDENTIFY: CPT

## 2019-01-01 PROCEDURE — 2709999900 HC NON-CHARGEABLE SUPPLY: Performed by: SURGERY

## 2019-01-01 PROCEDURE — 84484 ASSAY OF TROPONIN QUANT: CPT

## 2019-01-01 PROCEDURE — 1123F ACP DISCUSS/DSCN MKR DOCD: CPT | Performed by: ORTHOPAEDIC SURGERY

## 2019-01-01 PROCEDURE — 99213 OFFICE O/P EST LOW 20 MIN: CPT | Performed by: ORTHOPAEDIC SURGERY

## 2019-01-01 PROCEDURE — 84100 ASSAY OF PHOSPHORUS: CPT

## 2019-01-01 PROCEDURE — 6370000000 HC RX 637 (ALT 250 FOR IP): Performed by: SURGERY

## 2019-01-01 PROCEDURE — 94640 AIRWAY INHALATION TREATMENT: CPT

## 2019-01-01 PROCEDURE — 6370000000 HC RX 637 (ALT 250 FOR IP): Performed by: PSYCHIATRY & NEUROLOGY

## 2019-01-01 PROCEDURE — 2580000003 HC RX 258: Performed by: SURGERY

## 2019-01-01 PROCEDURE — 99223 1ST HOSP IP/OBS HIGH 75: CPT | Performed by: PHYSICIAN ASSISTANT

## 2019-01-01 PROCEDURE — 99232 SBSQ HOSP IP/OBS MODERATE 35: CPT | Performed by: NURSE PRACTITIONER

## 2019-01-01 PROCEDURE — 93005 ELECTROCARDIOGRAM TRACING: CPT | Performed by: EMERGENCY MEDICINE

## 2019-01-01 PROCEDURE — 71046 X-RAY EXAM CHEST 2 VIEWS: CPT

## 2019-01-01 PROCEDURE — 87641 MR-STAPH DNA AMP PROBE: CPT

## 2019-01-01 PROCEDURE — 99223 1ST HOSP IP/OBS HIGH 75: CPT | Performed by: INTERNAL MEDICINE

## 2019-01-01 PROCEDURE — 72125 CT NECK SPINE W/O DYE: CPT

## 2019-01-01 PROCEDURE — 82140 ASSAY OF AMMONIA: CPT

## 2019-01-01 PROCEDURE — G0008 ADMIN INFLUENZA VIRUS VAC: HCPCS | Performed by: INTERNAL MEDICINE

## 2019-01-01 PROCEDURE — 99239 HOSP IP/OBS DSCHRG MGMT >30: CPT | Performed by: INTERNAL MEDICINE

## 2019-01-01 PROCEDURE — 2500000003 HC RX 250 WO HCPCS: Performed by: SURGERY

## 2019-01-01 PROCEDURE — 99223 1ST HOSP IP/OBS HIGH 75: CPT | Performed by: PSYCHIATRY & NEUROLOGY

## 2019-01-01 PROCEDURE — 3600000002 HC SURGERY LEVEL 2 BASE: Performed by: SURGERY

## 2019-01-01 PROCEDURE — 2500000003 HC RX 250 WO HCPCS: Performed by: NURSE ANESTHETIST, CERTIFIED REGISTERED

## 2019-01-01 PROCEDURE — 99232 SBSQ HOSP IP/OBS MODERATE 35: CPT | Performed by: SURGERY

## 2019-01-01 PROCEDURE — G8598 ASA/ANTIPLAT THER USED: HCPCS | Performed by: ORTHOPAEDIC SURGERY

## 2019-01-01 PROCEDURE — G8427 DOCREV CUR MEDS BY ELIG CLIN: HCPCS | Performed by: ORTHOPAEDIC SURGERY

## 2019-01-01 PROCEDURE — 7100000011 HC PHASE II RECOVERY - ADDTL 15 MIN: Performed by: SURGERY

## 2019-01-01 PROCEDURE — 87040 BLOOD CULTURE FOR BACTERIA: CPT

## 2019-01-01 PROCEDURE — 96374 THER/PROPH/DIAG INJ IV PUSH: CPT

## 2019-01-01 PROCEDURE — G8417 CALC BMI ABV UP PARAM F/U: HCPCS | Performed by: ORTHOPAEDIC SURGERY

## 2019-01-01 PROCEDURE — 85610 PROTHROMBIN TIME: CPT

## 2019-01-01 PROCEDURE — 99238 HOSP IP/OBS DSCHRG MGMT 30/<: CPT | Performed by: INTERNAL MEDICINE

## 2019-01-01 PROCEDURE — 96375 TX/PRO/DX INJ NEW DRUG ADDON: CPT

## 2019-01-01 PROCEDURE — 6360000002 HC RX W HCPCS: Performed by: EMERGENCY MEDICINE

## 2019-01-01 PROCEDURE — 87086 URINE CULTURE/COLONY COUNT: CPT

## 2019-01-01 PROCEDURE — 73630 X-RAY EXAM OF FOOT: CPT

## 2019-01-01 PROCEDURE — 85027 COMPLETE CBC AUTOMATED: CPT

## 2019-01-01 PROCEDURE — 33274 TCAT INSJ/RPL PERM LDLS PM: CPT | Performed by: INTERNAL MEDICINE

## 2019-01-01 PROCEDURE — 6360000002 HC RX W HCPCS

## 2019-01-01 PROCEDURE — 86403 PARTICLE AGGLUT ANTBDY SCRN: CPT

## 2019-01-01 PROCEDURE — 99284 EMERGENCY DEPT VISIT MOD MDM: CPT

## 2019-01-01 PROCEDURE — 99231 SBSQ HOSP IP/OBS SF/LOW 25: CPT | Performed by: INTERNAL MEDICINE

## 2019-01-01 PROCEDURE — 10061 I&D ABSCESS COMP/MULTIPLE: CPT | Performed by: SURGERY

## 2019-01-01 PROCEDURE — 2580000003 HC RX 258: Performed by: EMERGENCY MEDICINE

## 2019-01-01 PROCEDURE — 99222 1ST HOSP IP/OBS MODERATE 55: CPT | Performed by: SURGERY

## 2019-01-01 PROCEDURE — 73718 MRI LOWER EXTREMITY W/O DYE: CPT

## 2019-01-01 PROCEDURE — 3700000000 HC ANESTHESIA ATTENDED CARE: Performed by: SURGERY

## 2019-01-01 PROCEDURE — 92610 EVALUATE SWALLOWING FUNCTION: CPT

## 2019-01-01 PROCEDURE — 97167 OT EVAL HIGH COMPLEX 60 MIN: CPT

## 2019-01-01 PROCEDURE — 83690 ASSAY OF LIPASE: CPT

## 2019-01-01 PROCEDURE — 94150 VITAL CAPACITY TEST: CPT

## 2019-01-01 PROCEDURE — 99222 1ST HOSP IP/OBS MODERATE 55: CPT | Performed by: INTERNAL MEDICINE

## 2019-01-01 PROCEDURE — 84443 ASSAY THYROID STIM HORMONE: CPT

## 2019-01-01 PROCEDURE — C1894 INTRO/SHEATH, NON-LASER: HCPCS

## 2019-01-01 PROCEDURE — 80069 RENAL FUNCTION PANEL: CPT

## 2019-01-01 PROCEDURE — 87186 SC STD MICRODIL/AGAR DIL: CPT

## 2019-01-01 PROCEDURE — 2709999900 HC NON-CHARGEABLE SUPPLY

## 2019-01-01 PROCEDURE — 83036 HEMOGLOBIN GLYCOSYLATED A1C: CPT

## 2019-01-01 PROCEDURE — 99024 POSTOP FOLLOW-UP VISIT: CPT | Performed by: NURSE PRACTITIONER

## 2019-01-01 PROCEDURE — 7100000010 HC PHASE II RECOVERY - FIRST 15 MIN: Performed by: SURGERY

## 2019-01-01 PROCEDURE — 96366 THER/PROPH/DIAG IV INF ADDON: CPT

## 2019-01-01 PROCEDURE — 73080 X-RAY EXAM OF ELBOW: CPT

## 2019-01-01 PROCEDURE — 87070 CULTURE OTHR SPECIMN AEROBIC: CPT

## 2019-01-01 PROCEDURE — 33274 TCAT INSJ/RPL PERM LDLS PM: CPT

## 2019-01-01 PROCEDURE — 73110 X-RAY EXAM OF WRIST: CPT

## 2019-01-01 PROCEDURE — 3600000012 HC SURGERY LEVEL 2 ADDTL 15MIN: Performed by: SURGERY

## 2019-01-01 PROCEDURE — 4040F PNEUMOC VAC/ADMIN/RCVD: CPT | Performed by: ORTHOPAEDIC SURGERY

## 2019-01-01 PROCEDURE — 2500000003 HC RX 250 WO HCPCS: Performed by: FAMILY MEDICINE

## 2019-01-01 PROCEDURE — 96367 TX/PROPH/DG ADDL SEQ IV INF: CPT

## 2019-01-01 RX ORDER — DEXTROSE MONOHYDRATE 25 G/50ML
12.5 INJECTION, SOLUTION INTRAVENOUS PRN
Status: DISCONTINUED | OUTPATIENT
Start: 2019-01-01 | End: 2019-01-01 | Stop reason: HOSPADM

## 2019-01-01 RX ORDER — HYDROXYZINE HYDROCHLORIDE 10 MG/1
25 TABLET, FILM COATED ORAL 3 TIMES DAILY
Status: ON HOLD | COMMUNITY
End: 2019-01-01 | Stop reason: SDUPTHER

## 2019-01-01 RX ORDER — NICOTINE POLACRILEX 4 MG
LOZENGE BUCCAL
Status: DISPENSED
Start: 2019-01-01 | End: 2019-01-01

## 2019-01-01 RX ORDER — MEPERIDINE HYDROCHLORIDE 50 MG/ML
12.5 INJECTION INTRAMUSCULAR; INTRAVENOUS; SUBCUTANEOUS EVERY 5 MIN PRN
Status: DISCONTINUED | OUTPATIENT
Start: 2019-01-01 | End: 2019-01-01 | Stop reason: HOSPADM

## 2019-01-01 RX ORDER — DIPHENHYDRAMINE HYDROCHLORIDE 50 MG/ML
25 INJECTION INTRAMUSCULAR; INTRAVENOUS ONCE
Status: COMPLETED | OUTPATIENT
Start: 2019-01-01 | End: 2019-01-01

## 2019-01-01 RX ORDER — PANTOPRAZOLE SODIUM 40 MG/1
40 TABLET, DELAYED RELEASE ORAL DAILY
COMMUNITY

## 2019-01-01 RX ORDER — GABAPENTIN 100 MG/1
300 CAPSULE ORAL DAILY
COMMUNITY

## 2019-01-01 RX ORDER — DEXTROSE MONOHYDRATE 50 MG/ML
100 INJECTION, SOLUTION INTRAVENOUS PRN
Status: DISCONTINUED | OUTPATIENT
Start: 2019-01-01 | End: 2019-01-01 | Stop reason: HOSPADM

## 2019-01-01 RX ORDER — SODIUM CHLORIDE 0.9 % (FLUSH) 0.9 %
10 SYRINGE (ML) INJECTION EVERY 12 HOURS SCHEDULED
Status: DISCONTINUED | OUTPATIENT
Start: 2019-01-01 | End: 2019-01-01 | Stop reason: HOSPADM

## 2019-01-01 RX ORDER — FENTANYL CITRATE 50 UG/ML
INJECTION, SOLUTION INTRAMUSCULAR; INTRAVENOUS
Status: COMPLETED | OUTPATIENT
Start: 2019-01-01 | End: 2019-01-01

## 2019-01-01 RX ORDER — NICOTINE POLACRILEX 4 MG
15 LOZENGE BUCCAL PRN
Status: DISCONTINUED | OUTPATIENT
Start: 2019-01-01 | End: 2019-01-01 | Stop reason: HOSPADM

## 2019-01-01 RX ORDER — SODIUM CHLORIDE 0.9 % (FLUSH) 0.9 %
10 SYRINGE (ML) INJECTION PRN
Status: DISCONTINUED | OUTPATIENT
Start: 2019-01-01 | End: 2019-01-01 | Stop reason: HOSPADM

## 2019-01-01 RX ORDER — CEPHALEXIN 500 MG/1
500 CAPSULE ORAL 4 TIMES DAILY
Qty: 28 CAPSULE | Refills: 0 | Status: SHIPPED | OUTPATIENT
Start: 2019-01-01 | End: 2019-01-01

## 2019-01-01 RX ORDER — ACETAMINOPHEN 325 MG/1
650 TABLET ORAL EVERY 6 HOURS PRN
Status: DISCONTINUED | OUTPATIENT
Start: 2019-01-01 | End: 2019-01-01 | Stop reason: HOSPADM

## 2019-01-01 RX ORDER — LIDOCAINE HYDROCHLORIDE 20 MG/ML
INJECTION, SOLUTION INFILTRATION; PERINEURAL PRN
Status: DISCONTINUED | OUTPATIENT
Start: 2019-01-01 | End: 2019-01-01 | Stop reason: SDUPTHER

## 2019-01-01 RX ORDER — TAMSULOSIN HYDROCHLORIDE 0.4 MG/1
0.4 CAPSULE ORAL DAILY
Status: DISCONTINUED | OUTPATIENT
Start: 2019-01-01 | End: 2019-01-01 | Stop reason: HOSPADM

## 2019-01-01 RX ORDER — ONDANSETRON 2 MG/ML
4 INJECTION INTRAMUSCULAR; INTRAVENOUS PRN
Status: DISCONTINUED | OUTPATIENT
Start: 2019-01-01 | End: 2019-01-01 | Stop reason: HOSPADM

## 2019-01-01 RX ORDER — SODIUM CHLORIDE 9 MG/ML
INJECTION, SOLUTION INTRAVENOUS CONTINUOUS PRN
Status: DISCONTINUED | OUTPATIENT
Start: 2019-01-01 | End: 2019-01-01 | Stop reason: SDUPTHER

## 2019-01-01 RX ORDER — QUETIAPINE FUMARATE 25 MG/1
25 TABLET, FILM COATED ORAL NIGHTLY
Status: DISCONTINUED | OUTPATIENT
Start: 2019-01-01 | End: 2019-01-01

## 2019-01-01 RX ORDER — GABAPENTIN 100 MG/1
100 CAPSULE ORAL 3 TIMES DAILY
Status: DISCONTINUED | OUTPATIENT
Start: 2019-01-01 | End: 2019-01-01 | Stop reason: HOSPADM

## 2019-01-01 RX ORDER — BUPIVACAINE HYDROCHLORIDE 5 MG/ML
INJECTION, SOLUTION PERINEURAL PRN
Status: DISCONTINUED | OUTPATIENT
Start: 2019-01-01 | End: 2019-01-01 | Stop reason: ALTCHOICE

## 2019-01-01 RX ORDER — TAMSULOSIN HYDROCHLORIDE 0.4 MG/1
0.4 CAPSULE ORAL NIGHTLY
Status: DISCONTINUED | OUTPATIENT
Start: 2019-01-01 | End: 2019-01-01 | Stop reason: HOSPADM

## 2019-01-01 RX ORDER — MAGNESIUM HYDROXIDE 1200 MG/15ML
LIQUID ORAL CONTINUOUS PRN
Status: COMPLETED | OUTPATIENT
Start: 2019-01-01 | End: 2019-01-01

## 2019-01-01 RX ORDER — QUETIAPINE FUMARATE 25 MG/1
25 TABLET, FILM COATED ORAL 2 TIMES DAILY
Status: DISCONTINUED | OUTPATIENT
Start: 2019-01-01 | End: 2019-01-01

## 2019-01-01 RX ORDER — MIDAZOLAM HYDROCHLORIDE 5 MG/ML
INJECTION INTRAMUSCULAR; INTRAVENOUS
Status: COMPLETED | OUTPATIENT
Start: 2019-01-01 | End: 2019-01-01

## 2019-01-01 RX ORDER — PROPOFOL 10 MG/ML
INJECTION, EMULSION INTRAVENOUS PRN
Status: DISCONTINUED | OUTPATIENT
Start: 2019-01-01 | End: 2019-01-01 | Stop reason: SDUPTHER

## 2019-01-01 RX ORDER — IPRATROPIUM BROMIDE AND ALBUTEROL SULFATE 2.5; .5 MG/3ML; MG/3ML
1 SOLUTION RESPIRATORY (INHALATION) EVERY 4 HOURS PRN
Status: DISCONTINUED | OUTPATIENT
Start: 2019-01-01 | End: 2019-01-01 | Stop reason: HOSPADM

## 2019-01-01 RX ORDER — ONDANSETRON 2 MG/ML
4 INJECTION INTRAMUSCULAR; INTRAVENOUS EVERY 6 HOURS PRN
Status: DISCONTINUED | OUTPATIENT
Start: 2019-01-01 | End: 2019-01-01 | Stop reason: HOSPADM

## 2019-01-01 RX ORDER — MORPHINE SULFATE 10 MG/ML
1 INJECTION, SOLUTION INTRAMUSCULAR; INTRAVENOUS EVERY 5 MIN PRN
Status: DISCONTINUED | OUTPATIENT
Start: 2019-01-01 | End: 2019-01-01 | Stop reason: HOSPADM

## 2019-01-01 RX ORDER — ACETAMINOPHEN 325 MG/1
650 TABLET ORAL EVERY 4 HOURS PRN
Status: CANCELLED | OUTPATIENT
Start: 2019-01-01

## 2019-01-01 RX ORDER — QUETIAPINE FUMARATE 25 MG/1
25 TABLET, FILM COATED ORAL 3 TIMES DAILY
Status: DISCONTINUED | OUTPATIENT
Start: 2019-01-01 | End: 2019-01-01 | Stop reason: HOSPADM

## 2019-01-01 RX ORDER — HYDROXYZINE HYDROCHLORIDE 10 MG/1
10 TABLET, FILM COATED ORAL 3 TIMES DAILY PRN
Status: DISCONTINUED | OUTPATIENT
Start: 2019-01-01 | End: 2019-01-01 | Stop reason: HOSPADM

## 2019-01-01 RX ORDER — NYSTATIN 100000 [USP'U]/G
POWDER TOPICAL
COMMUNITY

## 2019-01-01 RX ORDER — INSULIN GLARGINE 100 [IU]/ML
10 INJECTION, SOLUTION SUBCUTANEOUS NIGHTLY
Qty: 1 VIAL | Refills: 3 | Status: ON HOLD | DISCHARGE
Start: 2019-01-01 | End: 2019-01-01 | Stop reason: HOSPADM

## 2019-01-01 RX ORDER — ASPIRIN 81 MG/1
324 TABLET, CHEWABLE ORAL ONCE
Status: COMPLETED | OUTPATIENT
Start: 2019-01-01 | End: 2019-01-01

## 2019-01-01 RX ORDER — PANTOPRAZOLE SODIUM 40 MG/1
40 TABLET, DELAYED RELEASE ORAL
Status: DISCONTINUED | OUTPATIENT
Start: 2019-01-01 | End: 2019-01-01 | Stop reason: HOSPADM

## 2019-01-01 RX ORDER — PRAVASTATIN SODIUM 40 MG
80 TABLET ORAL DAILY
Status: DISCONTINUED | OUTPATIENT
Start: 2019-01-01 | End: 2019-01-01 | Stop reason: HOSPADM

## 2019-01-01 RX ORDER — HYDROMORPHONE HCL 110MG/55ML
0.5 PATIENT CONTROLLED ANALGESIA SYRINGE INTRAVENOUS EVERY 5 MIN PRN
Status: DISCONTINUED | OUTPATIENT
Start: 2019-01-01 | End: 2019-01-01 | Stop reason: HOSPADM

## 2019-01-01 RX ORDER — IPRATROPIUM BROMIDE AND ALBUTEROL SULFATE 2.5; .5 MG/3ML; MG/3ML
1 SOLUTION RESPIRATORY (INHALATION)
Status: DISCONTINUED | OUTPATIENT
Start: 2019-01-01 | End: 2019-01-01

## 2019-01-01 RX ORDER — PRAVASTATIN SODIUM 20 MG
20 TABLET ORAL NIGHTLY
Status: DISCONTINUED | OUTPATIENT
Start: 2019-01-01 | End: 2019-01-01 | Stop reason: HOSPADM

## 2019-01-01 RX ORDER — LORAZEPAM 2 MG/ML
1 INJECTION INTRAMUSCULAR ONCE
Status: COMPLETED | OUTPATIENT
Start: 2019-01-01 | End: 2019-01-01

## 2019-01-01 RX ORDER — DOXYCYCLINE HYCLATE 100 MG
100 TABLET ORAL 2 TIMES DAILY
Qty: 14 TABLET | Refills: 0 | Status: SHIPPED | OUTPATIENT
Start: 2019-01-01 | End: 2019-01-01

## 2019-01-01 RX ORDER — CHLORHEXIDINE GLUCONATE 0.12 MG/ML
15 RINSE ORAL 2 TIMES DAILY
Status: DISCONTINUED | OUTPATIENT
Start: 2019-01-01 | End: 2019-01-01 | Stop reason: HOSPADM

## 2019-01-01 RX ORDER — ZIPRASIDONE MESYLATE 20 MG/ML
20 INJECTION, POWDER, LYOPHILIZED, FOR SOLUTION INTRAMUSCULAR ONCE
Status: COMPLETED | OUTPATIENT
Start: 2019-01-01 | End: 2019-01-01

## 2019-01-01 RX ORDER — ROSUVASTATIN CALCIUM 10 MG/1
5 TABLET, COATED ORAL NIGHTLY
Status: DISCONTINUED | OUTPATIENT
Start: 2019-01-01 | End: 2019-01-01 | Stop reason: HOSPADM

## 2019-01-01 RX ORDER — TRAMADOL HYDROCHLORIDE 50 MG/1
50 TABLET ORAL EVERY 6 HOURS PRN
Status: ON HOLD | COMMUNITY
End: 2019-01-01 | Stop reason: HOSPADM

## 2019-01-01 RX ORDER — LORAZEPAM 2 MG/ML
2 INJECTION INTRAMUSCULAR ONCE
Status: COMPLETED | OUTPATIENT
Start: 2019-01-01 | End: 2019-01-01

## 2019-01-01 RX ORDER — ASPIRIN 81 MG/1
81 TABLET, CHEWABLE ORAL DAILY
Status: DISCONTINUED | OUTPATIENT
Start: 2019-01-01 | End: 2019-01-01 | Stop reason: HOSPADM

## 2019-01-01 RX ORDER — LORAZEPAM 2 MG/ML
2 INJECTION INTRAMUSCULAR EVERY 6 HOURS PRN
Status: DISCONTINUED | OUTPATIENT
Start: 2019-01-01 | End: 2019-01-01 | Stop reason: HOSPADM

## 2019-01-01 RX ORDER — LORAZEPAM 2 MG/ML
1 INJECTION INTRAMUSCULAR ONCE
Status: DISCONTINUED | OUTPATIENT
Start: 2019-01-01 | End: 2019-01-01 | Stop reason: HOSPADM

## 2019-01-01 RX ORDER — HYDROMORPHONE HCL 110MG/55ML
0.25 PATIENT CONTROLLED ANALGESIA SYRINGE INTRAVENOUS EVERY 5 MIN PRN
Status: DISCONTINUED | OUTPATIENT
Start: 2019-01-01 | End: 2019-01-01 | Stop reason: HOSPADM

## 2019-01-01 RX ORDER — LIDOCAINE HYDROCHLORIDE 10 MG/ML
INJECTION, SOLUTION EPIDURAL; INFILTRATION; INTRACAUDAL; PERINEURAL PRN
Status: DISCONTINUED | OUTPATIENT
Start: 2019-01-01 | End: 2019-01-01 | Stop reason: ALTCHOICE

## 2019-01-01 RX ORDER — CLOTRIMAZOLE 1 %
CREAM (GRAM) TOPICAL 2 TIMES DAILY
Status: DISCONTINUED | OUTPATIENT
Start: 2019-01-01 | End: 2019-01-01 | Stop reason: HOSPADM

## 2019-01-01 RX ORDER — HALOPERIDOL 5 MG/ML
2 INJECTION INTRAMUSCULAR ONCE
Status: COMPLETED | OUTPATIENT
Start: 2019-01-01 | End: 2019-01-01

## 2019-01-01 RX ORDER — SODIUM CHLORIDE 9 MG/ML
INJECTION, SOLUTION INTRAVENOUS
Status: COMPLETED
Start: 2019-01-01 | End: 2019-01-01

## 2019-01-01 RX ORDER — NITROGLYCERIN 0.4 MG/1
0.4 TABLET SUBLINGUAL EVERY 5 MIN PRN
Status: DISCONTINUED | OUTPATIENT
Start: 2019-01-01 | End: 2019-01-01 | Stop reason: HOSPADM

## 2019-01-01 RX ORDER — DIPHENHYDRAMINE HYDROCHLORIDE 50 MG/ML
12.5 INJECTION INTRAMUSCULAR; INTRAVENOUS
Status: DISCONTINUED | OUTPATIENT
Start: 2019-01-01 | End: 2019-01-01 | Stop reason: HOSPADM

## 2019-01-01 RX ORDER — INSULIN GLARGINE 100 [IU]/ML
10 INJECTION, SOLUTION SUBCUTANEOUS NIGHTLY
Status: DISCONTINUED | OUTPATIENT
Start: 2019-01-01 | End: 2019-01-01 | Stop reason: HOSPADM

## 2019-01-01 RX ORDER — LORAZEPAM 2 MG/ML
INJECTION INTRAMUSCULAR
Status: DISPENSED
Start: 2019-01-01 | End: 2019-01-01

## 2019-01-01 RX ORDER — DEXTROSE MONOHYDRATE 25 G/50ML
25 INJECTION, SOLUTION INTRAVENOUS ONCE
Status: COMPLETED | OUTPATIENT
Start: 2019-01-01 | End: 2019-01-01

## 2019-01-01 RX ORDER — HYDROXYZINE HYDROCHLORIDE 10 MG/1
10 TABLET, FILM COATED ORAL EVERY 8 HOURS PRN
DISCHARGE
Start: 2019-01-01

## 2019-01-01 RX ORDER — SODIUM CHLORIDE 0.9 % (FLUSH) 0.9 %
10 SYRINGE (ML) INJECTION EVERY 12 HOURS SCHEDULED
Status: DISCONTINUED | OUTPATIENT
Start: 2019-01-01 | End: 2019-01-01 | Stop reason: SDUPTHER

## 2019-01-01 RX ORDER — HALOPERIDOL 5 MG/ML
2 INJECTION INTRAMUSCULAR EVERY 6 HOURS PRN
Status: DISCONTINUED | OUTPATIENT
Start: 2019-01-01 | End: 2019-01-01

## 2019-01-01 RX ORDER — SODIUM CHLORIDE 0.9 % (FLUSH) 0.9 %
10 SYRINGE (ML) INJECTION EVERY 12 HOURS SCHEDULED
Status: CANCELLED | OUTPATIENT
Start: 2019-01-01

## 2019-01-01 RX ORDER — LISINOPRIL 5 MG/1
TABLET ORAL
COMMUNITY
Start: 2019-01-01

## 2019-01-01 RX ORDER — SODIUM CHLORIDE 0.9 % (FLUSH) 0.9 %
10 SYRINGE (ML) INJECTION PRN
Status: CANCELLED | OUTPATIENT
Start: 2019-01-01

## 2019-01-01 RX ORDER — HYDRALAZINE HYDROCHLORIDE 20 MG/ML
10 INJECTION INTRAMUSCULAR; INTRAVENOUS EVERY 6 HOURS PRN
Status: DISCONTINUED | OUTPATIENT
Start: 2019-01-01 | End: 2019-01-01 | Stop reason: HOSPADM

## 2019-01-01 RX ORDER — 0.9 % SODIUM CHLORIDE 0.9 %
1000 INTRAVENOUS SOLUTION INTRAVENOUS ONCE
Status: COMPLETED | OUTPATIENT
Start: 2019-01-01 | End: 2019-01-01

## 2019-01-01 RX ORDER — POTASSIUM CHLORIDE 750 MG/1
10 CAPSULE, EXTENDED RELEASE ORAL 2 TIMES DAILY
COMMUNITY

## 2019-01-01 RX ORDER — ACETAMINOPHEN 500 MG
1000 TABLET ORAL ONCE
Status: COMPLETED | OUTPATIENT
Start: 2019-01-01 | End: 2019-01-01

## 2019-01-01 RX ORDER — FUROSEMIDE 20 MG/1
TABLET ORAL
COMMUNITY

## 2019-01-01 RX ORDER — ACETAMINOPHEN 325 MG/1
325 TABLET ORAL EVERY 4 HOURS PRN
COMMUNITY

## 2019-01-01 RX ORDER — DOXYCYCLINE HYCLATE 100 MG
100 TABLET ORAL 2 TIMES DAILY
Qty: 14 TABLET | Refills: 0 | Status: SHIPPED | OUTPATIENT
Start: 2019-01-01 | End: 2019-01-01 | Stop reason: SDUPTHER

## 2019-01-01 RX ORDER — ASPIRIN 81 MG/1
81 TABLET, CHEWABLE ORAL DAILY
Qty: 30 TABLET | Refills: 3 | DISCHARGE
Start: 2019-01-01

## 2019-01-01 RX ORDER — LORAZEPAM 2 MG/ML
1 INJECTION INTRAMUSCULAR EVERY 4 HOURS PRN
Status: DISCONTINUED | OUTPATIENT
Start: 2019-01-01 | End: 2019-01-01 | Stop reason: HOSPADM

## 2019-01-01 RX ORDER — LORAZEPAM 2 MG/ML
1 INJECTION INTRAMUSCULAR ONCE
Status: DISCONTINUED | OUTPATIENT
Start: 2019-01-01 | End: 2019-01-01

## 2019-01-01 RX ORDER — BETAMETHASONE DIPROPIONATE 0.5 MG/G
CREAM TOPICAL 2 TIMES DAILY
Status: ON HOLD | COMMUNITY
End: 2019-01-01 | Stop reason: HOSPADM

## 2019-01-01 RX ORDER — LORAZEPAM 1 MG/1
1 TABLET ORAL EVERY 6 HOURS PRN
Qty: 12 TABLET | Refills: 0 | Status: SHIPPED | OUTPATIENT
Start: 2019-01-01 | End: 2019-01-01

## 2019-01-01 RX ORDER — MAGNESIUM SULFATE 1 G/100ML
1 INJECTION INTRAVENOUS PRN
Status: DISCONTINUED | OUTPATIENT
Start: 2019-01-01 | End: 2019-01-01 | Stop reason: HOSPADM

## 2019-01-01 RX ORDER — HYDRALAZINE HYDROCHLORIDE 20 MG/ML
5 INJECTION INTRAMUSCULAR; INTRAVENOUS EVERY 10 MIN PRN
Status: DISCONTINUED | OUTPATIENT
Start: 2019-01-01 | End: 2019-01-01 | Stop reason: HOSPADM

## 2019-01-01 RX ORDER — DIPHENHYDRAMINE HYDROCHLORIDE 50 MG/ML
INJECTION INTRAMUSCULAR; INTRAVENOUS
Status: COMPLETED
Start: 2019-01-01 | End: 2019-01-01

## 2019-01-01 RX ORDER — PRAVASTATIN SODIUM 80 MG/1
80 TABLET ORAL DAILY
Status: DISCONTINUED | OUTPATIENT
Start: 2019-01-01 | End: 2019-01-01 | Stop reason: HOSPADM

## 2019-01-01 RX ORDER — POTASSIUM CHLORIDE 7.45 MG/ML
10 INJECTION INTRAVENOUS PRN
Status: DISCONTINUED | OUTPATIENT
Start: 2019-01-01 | End: 2019-01-01 | Stop reason: HOSPADM

## 2019-01-01 RX ORDER — INSULIN GLARGINE 100 [IU]/ML
6 INJECTION, SOLUTION SUBCUTANEOUS NIGHTLY
Status: DISCONTINUED | OUTPATIENT
Start: 2019-01-01 | End: 2019-01-01 | Stop reason: HOSPADM

## 2019-01-01 RX ORDER — MORPHINE SULFATE 10 MG/ML
2 INJECTION, SOLUTION INTRAMUSCULAR; INTRAVENOUS EVERY 5 MIN PRN
Status: DISCONTINUED | OUTPATIENT
Start: 2019-01-01 | End: 2019-01-01 | Stop reason: HOSPADM

## 2019-01-01 RX ORDER — LABETALOL HYDROCHLORIDE 5 MG/ML
5 INJECTION, SOLUTION INTRAVENOUS EVERY 10 MIN PRN
Status: DISCONTINUED | OUTPATIENT
Start: 2019-01-01 | End: 2019-01-01 | Stop reason: HOSPADM

## 2019-01-01 RX ORDER — ACETAMINOPHEN 325 MG/1
650 TABLET ORAL EVERY 4 HOURS PRN
Status: DISCONTINUED | OUTPATIENT
Start: 2019-01-01 | End: 2019-01-01 | Stop reason: HOSPADM

## 2019-01-01 RX ORDER — ROSUVASTATIN CALCIUM 5 MG/1
5 TABLET, COATED ORAL NIGHTLY
COMMUNITY

## 2019-01-01 RX ORDER — PROMETHAZINE HYDROCHLORIDE 25 MG/ML
6.25 INJECTION, SOLUTION INTRAMUSCULAR; INTRAVENOUS
Status: DISCONTINUED | OUTPATIENT
Start: 2019-01-01 | End: 2019-01-01 | Stop reason: HOSPADM

## 2019-01-01 RX ORDER — POTASSIUM CHLORIDE 20 MEQ/1
40 TABLET, EXTENDED RELEASE ORAL PRN
Status: DISCONTINUED | OUTPATIENT
Start: 2019-01-01 | End: 2019-01-01 | Stop reason: HOSPADM

## 2019-01-01 RX ORDER — HALOPERIDOL 5 MG/ML
5 INJECTION INTRAMUSCULAR EVERY 12 HOURS PRN
Status: DISCONTINUED | OUTPATIENT
Start: 2019-01-01 | End: 2019-01-01 | Stop reason: HOSPADM

## 2019-01-01 RX ORDER — QUETIAPINE FUMARATE 25 MG/1
12.5 TABLET, FILM COATED ORAL NIGHTLY
Status: DISCONTINUED | OUTPATIENT
Start: 2019-01-01 | End: 2019-01-01

## 2019-01-01 RX ORDER — FUROSEMIDE 40 MG/1
40 TABLET ORAL DAILY
Status: DISCONTINUED | OUTPATIENT
Start: 2019-01-01 | End: 2019-01-01

## 2019-01-01 RX ORDER — DEXTROSE AND SODIUM CHLORIDE 5; .45 G/100ML; G/100ML
INJECTION, SOLUTION INTRAVENOUS CONTINUOUS
Status: DISCONTINUED | OUTPATIENT
Start: 2019-01-01 | End: 2019-01-01 | Stop reason: HOSPADM

## 2019-01-01 RX ORDER — ATROPINE SULFATE 0.1 MG/ML
1 INJECTION INTRAVENOUS ONCE
Status: COMPLETED | OUTPATIENT
Start: 2019-01-01 | End: 2019-01-01

## 2019-01-01 RX ORDER — SODIUM CHLORIDE 0.9 % (FLUSH) 0.9 %
10 SYRINGE (ML) INJECTION PRN
Status: DISCONTINUED | OUTPATIENT
Start: 2019-01-01 | End: 2019-01-01 | Stop reason: SDUPTHER

## 2019-01-01 RX ORDER — IPRATROPIUM BROMIDE AND ALBUTEROL SULFATE 2.5; .5 MG/3ML; MG/3ML
1 SOLUTION RESPIRATORY (INHALATION) 3 TIMES DAILY
Status: DISCONTINUED | OUTPATIENT
Start: 2019-01-01 | End: 2019-01-01

## 2019-01-01 RX ORDER — PANTOPRAZOLE SODIUM 40 MG/1
40 TABLET, DELAYED RELEASE ORAL DAILY
Status: DISCONTINUED | OUTPATIENT
Start: 2019-01-01 | End: 2019-01-01 | Stop reason: HOSPADM

## 2019-01-01 RX ORDER — AMLODIPINE BESYLATE 5 MG/1
5 TABLET ORAL DAILY
Status: DISCONTINUED | OUTPATIENT
Start: 2019-01-01 | End: 2019-01-01 | Stop reason: HOSPADM

## 2019-01-01 RX ORDER — DOXYCYCLINE HYCLATE 100 MG/1
100 CAPSULE ORAL 2 TIMES DAILY
Status: ON HOLD | COMMUNITY
Start: 2019-01-01 | End: 2019-01-01 | Stop reason: HOSPADM

## 2019-01-01 RX ORDER — INSULIN GLARGINE 100 [IU]/ML
20 INJECTION, SOLUTION SUBCUTANEOUS 2 TIMES DAILY
Status: DISCONTINUED | OUTPATIENT
Start: 2019-01-01 | End: 2019-01-01

## 2019-01-01 RX ADMIN — ACETAMINOPHEN 650 MG: 325 TABLET ORAL at 20:24

## 2019-01-01 RX ADMIN — MICONAZOLE NITRATE: 2 POWDER TOPICAL at 10:42

## 2019-01-01 RX ADMIN — GABAPENTIN 100 MG: 100 CAPSULE ORAL at 21:08

## 2019-01-01 RX ADMIN — ENOXAPARIN SODIUM 30 MG: 30 INJECTION SUBCUTANEOUS at 10:57

## 2019-01-01 RX ADMIN — ASPIRIN 81 MG 324 MG: 81 TABLET ORAL at 15:45

## 2019-01-01 RX ADMIN — ACETAMINOPHEN 650 MG: 325 TABLET ORAL at 15:24

## 2019-01-01 RX ADMIN — DIPHENHYDRAMINE HYDROCHLORIDE 25 MG: 50 INJECTION, SOLUTION INTRAMUSCULAR; INTRAVENOUS at 00:23

## 2019-01-01 RX ADMIN — GABAPENTIN 100 MG: 100 CAPSULE ORAL at 10:57

## 2019-01-01 RX ADMIN — ASPIRIN 81 MG 81 MG: 81 TABLET ORAL at 08:24

## 2019-01-01 RX ADMIN — DEXTROSE 50 % IN WATER (D50W) INTRAVENOUS SYRINGE 12.5 G: at 07:38

## 2019-01-01 RX ADMIN — Medication 10 ML: at 20:47

## 2019-01-01 RX ADMIN — CHLORHEXIDINE GLUCONATE 0.12% ORAL RINSE 15 ML: 1.2 LIQUID ORAL at 10:03

## 2019-01-01 RX ADMIN — Medication 10 ML: at 09:30

## 2019-01-01 RX ADMIN — CEFEPIME HYDROCHLORIDE 1 G: 1 INJECTION, POWDER, FOR SOLUTION INTRAMUSCULAR; INTRAVENOUS at 05:33

## 2019-01-01 RX ADMIN — INSULIN GLARGINE 20 UNITS: 100 INJECTION, SOLUTION SUBCUTANEOUS at 10:05

## 2019-01-01 RX ADMIN — GABAPENTIN 100 MG: 100 CAPSULE ORAL at 20:18

## 2019-01-01 RX ADMIN — LORAZEPAM 1 MG: 2 INJECTION INTRAMUSCULAR; INTRAVENOUS at 21:28

## 2019-01-01 RX ADMIN — CHLORHEXIDINE GLUCONATE 0.12% ORAL RINSE 15 ML: 1.2 LIQUID ORAL at 10:07

## 2019-01-01 RX ADMIN — SODIUM CHLORIDE, PRESERVATIVE FREE 10 ML: 5 INJECTION INTRAVENOUS at 21:08

## 2019-01-01 RX ADMIN — ENOXAPARIN SODIUM 40 MG: 40 INJECTION SUBCUTANEOUS at 17:51

## 2019-01-01 RX ADMIN — IPRATROPIUM BROMIDE AND ALBUTEROL SULFATE 1 AMPULE: .5; 3 SOLUTION RESPIRATORY (INHALATION) at 19:19

## 2019-01-01 RX ADMIN — PANTOPRAZOLE SODIUM 40 MG: 40 TABLET, DELAYED RELEASE ORAL at 06:03

## 2019-01-01 RX ADMIN — INSULIN GLARGINE 6 UNITS: 100 INJECTION, SOLUTION SUBCUTANEOUS at 21:34

## 2019-01-01 RX ADMIN — ENOXAPARIN SODIUM 40 MG: 40 INJECTION SUBCUTANEOUS at 09:40

## 2019-01-01 RX ADMIN — IPRATROPIUM BROMIDE AND ALBUTEROL SULFATE 1 AMPULE: .5; 3 SOLUTION RESPIRATORY (INHALATION) at 07:28

## 2019-01-01 RX ADMIN — INSULIN LISPRO 2 UNITS: 100 INJECTION, SOLUTION INTRAVENOUS; SUBCUTANEOUS at 13:08

## 2019-01-01 RX ADMIN — ASPIRIN 81 MG 81 MG: 81 TABLET ORAL at 10:19

## 2019-01-01 RX ADMIN — HYDRALAZINE HYDROCHLORIDE 10 MG: 20 INJECTION INTRAMUSCULAR; INTRAVENOUS at 06:18

## 2019-01-01 RX ADMIN — Medication 10 ML: at 21:52

## 2019-01-01 RX ADMIN — Medication 10 ML: at 08:28

## 2019-01-01 RX ADMIN — QUETIAPINE FUMARATE 25 MG: 25 TABLET ORAL at 14:09

## 2019-01-01 RX ADMIN — INSULIN LISPRO 2 UNITS: 100 INJECTION, SOLUTION INTRAVENOUS; SUBCUTANEOUS at 08:34

## 2019-01-01 RX ADMIN — TAMSULOSIN HYDROCHLORIDE 0.4 MG: 0.4 CAPSULE ORAL at 08:24

## 2019-01-01 RX ADMIN — DIPHENHYDRAMINE HYDROCHLORIDE 25 MG: 50 INJECTION, SOLUTION INTRAMUSCULAR; INTRAVENOUS at 00:24

## 2019-01-01 RX ADMIN — ASPIRIN 81 MG 81 MG: 81 TABLET ORAL at 09:26

## 2019-01-01 RX ADMIN — QUETIAPINE FUMARATE 25 MG: 25 TABLET ORAL at 01:32

## 2019-01-01 RX ADMIN — Medication 10 ML: at 08:22

## 2019-01-01 RX ADMIN — VANCOMYCIN HYDROCHLORIDE 1.5 G: 1 INJECTION, POWDER, LYOPHILIZED, FOR SOLUTION INTRAVENOUS at 16:30

## 2019-01-01 RX ADMIN — ENOXAPARIN SODIUM 40 MG: 40 INJECTION SUBCUTANEOUS at 17:38

## 2019-01-01 RX ADMIN — INSULIN LISPRO 1 UNITS: 100 INJECTION, SOLUTION INTRAVENOUS; SUBCUTANEOUS at 16:22

## 2019-01-01 RX ADMIN — INSULIN LISPRO 2 UNITS: 100 INJECTION, SOLUTION INTRAVENOUS; SUBCUTANEOUS at 17:23

## 2019-01-01 RX ADMIN — IPRATROPIUM BROMIDE AND ALBUTEROL SULFATE 1 AMPULE: .5; 3 SOLUTION RESPIRATORY (INHALATION) at 11:48

## 2019-01-01 RX ADMIN — Medication 10 ML: at 23:04

## 2019-01-01 RX ADMIN — QUETIAPINE FUMARATE 25 MG: 25 TABLET ORAL at 15:56

## 2019-01-01 RX ADMIN — INSULIN GLARGINE 20 UNITS: 100 INJECTION, SOLUTION SUBCUTANEOUS at 08:34

## 2019-01-01 RX ADMIN — Medication 10 ML: at 10:01

## 2019-01-01 RX ADMIN — INSULIN GLARGINE 20 UNITS: 100 INJECTION, SOLUTION SUBCUTANEOUS at 08:50

## 2019-01-01 RX ADMIN — GABAPENTIN 100 MG: 100 CAPSULE ORAL at 00:34

## 2019-01-01 RX ADMIN — Medication 10 ML: at 09:14

## 2019-01-01 RX ADMIN — Medication 10 ML: at 08:37

## 2019-01-01 RX ADMIN — GLUCAGON HYDROCHLORIDE 1 MG: 1 INJECTION, POWDER, FOR SOLUTION INTRAMUSCULAR; INTRAVENOUS; SUBCUTANEOUS at 21:03

## 2019-01-01 RX ADMIN — INSULIN LISPRO 1 UNITS: 100 INJECTION, SOLUTION INTRAVENOUS; SUBCUTANEOUS at 17:19

## 2019-01-01 RX ADMIN — GABAPENTIN 100 MG: 100 CAPSULE ORAL at 14:41

## 2019-01-01 RX ADMIN — GABAPENTIN 100 MG: 100 CAPSULE ORAL at 22:00

## 2019-01-01 RX ADMIN — PANTOPRAZOLE SODIUM 40 MG: 40 TABLET, DELAYED RELEASE ORAL at 06:25

## 2019-01-01 RX ADMIN — IPRATROPIUM BROMIDE AND ALBUTEROL SULFATE 1 AMPULE: .5; 3 SOLUTION RESPIRATORY (INHALATION) at 10:49

## 2019-01-01 RX ADMIN — SODIUM CHLORIDE, PRESERVATIVE FREE 10 ML: 5 INJECTION INTRAVENOUS at 09:26

## 2019-01-01 RX ADMIN — INSULIN LISPRO 1 UNITS: 100 INJECTION, SOLUTION INTRAVENOUS; SUBCUTANEOUS at 08:22

## 2019-01-01 RX ADMIN — CEFEPIME HYDROCHLORIDE 1 G: 1 INJECTION, POWDER, FOR SOLUTION INTRAMUSCULAR; INTRAVENOUS at 13:06

## 2019-01-01 RX ADMIN — HYDROXYZINE HYDROCHLORIDE 10 MG: 10 TABLET ORAL at 10:19

## 2019-01-01 RX ADMIN — ASPIRIN 81 MG 81 MG: 81 TABLET ORAL at 08:42

## 2019-01-01 RX ADMIN — INSULIN LISPRO 65 UNITS: 100 INJECTION, SUSPENSION SUBCUTANEOUS at 08:43

## 2019-01-01 RX ADMIN — INSULIN GLARGINE 20 UNITS: 100 INJECTION, SOLUTION SUBCUTANEOUS at 10:20

## 2019-01-01 RX ADMIN — FENTANYL CITRATE 25 MCG: 50 INJECTION, SOLUTION INTRAMUSCULAR; INTRAVENOUS at 10:53

## 2019-01-01 RX ADMIN — FENTANYL CITRATE 25 MCG: 50 INJECTION, SOLUTION INTRAMUSCULAR; INTRAVENOUS at 10:59

## 2019-01-01 RX ADMIN — ENOXAPARIN SODIUM 30 MG: 30 INJECTION SUBCUTANEOUS at 18:30

## 2019-01-01 RX ADMIN — INSULIN GLARGINE 6 UNITS: 100 INJECTION, SOLUTION SUBCUTANEOUS at 20:26

## 2019-01-01 RX ADMIN — CEFEPIME HYDROCHLORIDE 1 G: 1 INJECTION, POWDER, FOR SOLUTION INTRAMUSCULAR; INTRAVENOUS at 15:04

## 2019-01-01 RX ADMIN — PRAVASTATIN SODIUM 80 MG: 40 TABLET ORAL at 08:37

## 2019-01-01 RX ADMIN — PRAVASTATIN SODIUM 80 MG: 40 TABLET ORAL at 09:51

## 2019-01-01 RX ADMIN — INSULIN LISPRO 1 UNITS: 100 INJECTION, SOLUTION INTRAVENOUS; SUBCUTANEOUS at 11:31

## 2019-01-01 RX ADMIN — INSULIN LISPRO 2 UNITS: 100 INJECTION, SOLUTION INTRAVENOUS; SUBCUTANEOUS at 20:30

## 2019-01-01 RX ADMIN — INSULIN LISPRO 3 UNITS: 100 INJECTION, SOLUTION INTRAVENOUS; SUBCUTANEOUS at 20:23

## 2019-01-01 RX ADMIN — GABAPENTIN 100 MG: 100 CAPSULE ORAL at 15:42

## 2019-01-01 RX ADMIN — PANTOPRAZOLE SODIUM 40 MG: 40 TABLET, DELAYED RELEASE ORAL at 08:28

## 2019-01-01 RX ADMIN — INSULIN LISPRO 2 UNITS: 100 INJECTION, SOLUTION INTRAVENOUS; SUBCUTANEOUS at 16:27

## 2019-01-01 RX ADMIN — INSULIN LISPRO 2 UNITS: 100 INJECTION, SOLUTION INTRAVENOUS; SUBCUTANEOUS at 10:05

## 2019-01-01 RX ADMIN — SODIUM CHLORIDE: 9 INJECTION, SOLUTION INTRAVENOUS at 21:24

## 2019-01-01 RX ADMIN — INSULIN GLARGINE 20 UNITS: 100 INJECTION, SOLUTION SUBCUTANEOUS at 10:07

## 2019-01-01 RX ADMIN — MICONAZOLE NITRATE: 2 POWDER TOPICAL at 22:00

## 2019-01-01 RX ADMIN — ASPIRIN 81 MG 81 MG: 81 TABLET ORAL at 22:26

## 2019-01-01 RX ADMIN — Medication 10 ML: at 23:13

## 2019-01-01 RX ADMIN — Medication 10 ML: at 09:05

## 2019-01-01 RX ADMIN — PANTOPRAZOLE SODIUM 40 MG: 40 TABLET, DELAYED RELEASE ORAL at 22:26

## 2019-01-01 RX ADMIN — GABAPENTIN 100 MG: 100 CAPSULE ORAL at 13:32

## 2019-01-01 RX ADMIN — ACETAMINOPHEN 650 MG: 325 TABLET ORAL at 10:06

## 2019-01-01 RX ADMIN — GABAPENTIN 100 MG: 100 CAPSULE ORAL at 08:37

## 2019-01-01 RX ADMIN — DEXTROSE AND SODIUM CHLORIDE: 5; 450 INJECTION, SOLUTION INTRAVENOUS at 01:48

## 2019-01-01 RX ADMIN — INSULIN LISPRO 6 UNITS: 100 INJECTION, SOLUTION INTRAVENOUS; SUBCUTANEOUS at 10:20

## 2019-01-01 RX ADMIN — GABAPENTIN 100 MG: 100 CAPSULE ORAL at 08:50

## 2019-01-01 RX ADMIN — INSULIN LISPRO 65 UNITS: 100 INJECTION, SUSPENSION SUBCUTANEOUS at 09:51

## 2019-01-01 RX ADMIN — SODIUM CHLORIDE, PRESERVATIVE FREE 10 ML: 5 INJECTION INTRAVENOUS at 00:35

## 2019-01-01 RX ADMIN — CLOTRIMAZOLE: 10 CREAM TOPICAL at 08:51

## 2019-01-01 RX ADMIN — GABAPENTIN 100 MG: 100 CAPSULE ORAL at 21:01

## 2019-01-01 RX ADMIN — TAMSULOSIN HYDROCHLORIDE 0.4 MG: 0.4 CAPSULE ORAL at 09:26

## 2019-01-01 RX ADMIN — Medication 10 ML: at 20:19

## 2019-01-01 RX ADMIN — SODIUM CHLORIDE 1000 ML: 9 INJECTION, SOLUTION INTRAVENOUS at 14:18

## 2019-01-01 RX ADMIN — GABAPENTIN 100 MG: 100 CAPSULE ORAL at 21:41

## 2019-01-01 RX ADMIN — Medication 10 ML: at 20:30

## 2019-01-01 RX ADMIN — Medication 10 ML: at 20:37

## 2019-01-01 RX ADMIN — AMLODIPINE BESYLATE 5 MG: 5 TABLET ORAL at 09:06

## 2019-01-01 RX ADMIN — LORAZEPAM 1 MG: 2 INJECTION, SOLUTION INTRAMUSCULAR; INTRAVENOUS at 09:12

## 2019-01-01 RX ADMIN — SODIUM CHLORIDE, PRESERVATIVE FREE 10 ML: 5 INJECTION INTRAVENOUS at 10:57

## 2019-01-01 RX ADMIN — PRAVASTATIN SODIUM 80 MG: 80 TABLET ORAL at 08:50

## 2019-01-01 RX ADMIN — Medication 10 ML: at 08:29

## 2019-01-01 RX ADMIN — CEFEPIME HYDROCHLORIDE 2 G: 2 INJECTION, POWDER, FOR SOLUTION INTRAVENOUS at 15:57

## 2019-01-01 RX ADMIN — Medication 10 ML: at 22:27

## 2019-01-01 RX ADMIN — CEFEPIME HYDROCHLORIDE 1 G: 1 INJECTION, POWDER, FOR SOLUTION INTRAMUSCULAR; INTRAVENOUS at 22:01

## 2019-01-01 RX ADMIN — INSULIN LISPRO 55 UNITS: 100 INJECTION, SUSPENSION SUBCUTANEOUS at 16:29

## 2019-01-01 RX ADMIN — PRAVASTATIN SODIUM 20 MG: 20 TABLET ORAL at 20:24

## 2019-01-01 RX ADMIN — INSULIN LISPRO 2 UNITS: 100 INJECTION, SOLUTION INTRAVENOUS; SUBCUTANEOUS at 18:23

## 2019-01-01 RX ADMIN — IPRATROPIUM BROMIDE AND ALBUTEROL SULFATE 1 AMPULE: .5; 3 SOLUTION RESPIRATORY (INHALATION) at 13:29

## 2019-01-01 RX ADMIN — QUETIAPINE FUMARATE 25 MG: 25 TABLET ORAL at 09:40

## 2019-01-01 RX ADMIN — Medication 10 ML: at 20:44

## 2019-01-01 RX ADMIN — IPRATROPIUM BROMIDE AND ALBUTEROL SULFATE 1 AMPULE: .5; 3 SOLUTION RESPIRATORY (INHALATION) at 22:47

## 2019-01-01 RX ADMIN — Medication 10 ML: at 09:52

## 2019-01-01 RX ADMIN — Medication 10 ML: at 21:19

## 2019-01-01 RX ADMIN — DEXTROSE 50 % IN WATER (D50W) INTRAVENOUS SYRINGE 25 G: at 17:11

## 2019-01-01 RX ADMIN — PRAVASTATIN SODIUM 80 MG: 40 TABLET ORAL at 09:04

## 2019-01-01 RX ADMIN — GABAPENTIN 100 MG: 100 CAPSULE ORAL at 09:51

## 2019-01-01 RX ADMIN — CHLORHEXIDINE GLUCONATE 0.12% ORAL RINSE 15 ML: 1.2 LIQUID ORAL at 08:29

## 2019-01-01 RX ADMIN — ZIPRASIDONE MESYLATE 20 MG: 20 INJECTION, POWDER, LYOPHILIZED, FOR SOLUTION INTRAMUSCULAR at 04:26

## 2019-01-01 RX ADMIN — QUETIAPINE FUMARATE 25 MG: 25 TABLET ORAL at 14:53

## 2019-01-01 RX ADMIN — CEFEPIME HYDROCHLORIDE 1 G: 1 INJECTION, POWDER, FOR SOLUTION INTRAMUSCULAR; INTRAVENOUS at 17:07

## 2019-01-01 RX ADMIN — INSULIN LISPRO 3 UNITS: 100 INJECTION, SOLUTION INTRAVENOUS; SUBCUTANEOUS at 10:58

## 2019-01-01 RX ADMIN — INSULIN LISPRO 4 UNITS: 100 INJECTION, SOLUTION INTRAVENOUS; SUBCUTANEOUS at 08:55

## 2019-01-01 RX ADMIN — INSULIN GLARGINE 20 UNITS: 100 INJECTION, SOLUTION SUBCUTANEOUS at 09:10

## 2019-01-01 RX ADMIN — PANTOPRAZOLE SODIUM 40 MG: 40 TABLET, DELAYED RELEASE ORAL at 06:31

## 2019-01-01 RX ADMIN — HYDRALAZINE HYDROCHLORIDE 10 MG: 20 INJECTION INTRAMUSCULAR; INTRAVENOUS at 11:33

## 2019-01-01 RX ADMIN — GABAPENTIN 100 MG: 100 CAPSULE ORAL at 14:32

## 2019-01-01 RX ADMIN — TAMSULOSIN HYDROCHLORIDE 0.4 MG: 0.4 CAPSULE ORAL at 20:44

## 2019-01-01 RX ADMIN — GABAPENTIN 100 MG: 100 CAPSULE ORAL at 20:44

## 2019-01-01 RX ADMIN — INSULIN LISPRO 6 UNITS: 100 INJECTION, SOLUTION INTRAVENOUS; SUBCUTANEOUS at 12:31

## 2019-01-01 RX ADMIN — TAMSULOSIN HYDROCHLORIDE 0.4 MG: 0.4 CAPSULE ORAL at 21:01

## 2019-01-01 RX ADMIN — VANCOMYCIN HYDROCHLORIDE 750 MG: 1 INJECTION, POWDER, LYOPHILIZED, FOR SOLUTION INTRAVENOUS at 09:06

## 2019-01-01 RX ADMIN — GABAPENTIN 100 MG: 100 CAPSULE ORAL at 09:27

## 2019-01-01 RX ADMIN — ENOXAPARIN SODIUM 30 MG: 30 INJECTION SUBCUTANEOUS at 09:53

## 2019-01-01 RX ADMIN — ASPIRIN 81 MG 81 MG: 81 TABLET ORAL at 12:07

## 2019-01-01 RX ADMIN — INSULIN LISPRO 1 UNITS: 100 INJECTION, SOLUTION INTRAVENOUS; SUBCUTANEOUS at 20:09

## 2019-01-01 RX ADMIN — SODIUM CHLORIDE, PRESERVATIVE FREE 10 ML: 5 INJECTION INTRAVENOUS at 08:51

## 2019-01-01 RX ADMIN — ROSUVASTATIN CALCIUM 5 MG: 10 TABLET, FILM COATED ORAL at 22:57

## 2019-01-01 RX ADMIN — WATER: 1 INJECTION INTRAMUSCULAR; INTRAVENOUS; SUBCUTANEOUS at 21:15

## 2019-01-01 RX ADMIN — Medication 10 ML: at 21:00

## 2019-01-01 RX ADMIN — CEFEPIME HYDROCHLORIDE 1 G: 1 INJECTION, POWDER, FOR SOLUTION INTRAMUSCULAR; INTRAVENOUS at 04:07

## 2019-01-01 RX ADMIN — VANCOMYCIN HYDROCHLORIDE 1000 MG: 1 INJECTION, POWDER, LYOPHILIZED, FOR SOLUTION INTRAVENOUS at 10:15

## 2019-01-01 RX ADMIN — IPRATROPIUM BROMIDE AND ALBUTEROL SULFATE 1 AMPULE: .5; 3 SOLUTION RESPIRATORY (INHALATION) at 14:32

## 2019-01-01 RX ADMIN — ENOXAPARIN SODIUM 40 MG: 40 INJECTION SUBCUTANEOUS at 08:38

## 2019-01-01 RX ADMIN — TAMSULOSIN HYDROCHLORIDE 0.4 MG: 0.4 CAPSULE ORAL at 08:38

## 2019-01-01 RX ADMIN — ENOXAPARIN SODIUM 40 MG: 40 INJECTION SUBCUTANEOUS at 09:25

## 2019-01-01 RX ADMIN — CEFEPIME HYDROCHLORIDE 1 G: 1 INJECTION, POWDER, FOR SOLUTION INTRAMUSCULAR; INTRAVENOUS at 16:56

## 2019-01-01 RX ADMIN — ENOXAPARIN SODIUM 30 MG: 30 INJECTION SUBCUTANEOUS at 08:37

## 2019-01-01 RX ADMIN — PANTOPRAZOLE SODIUM 40 MG: 40 TABLET, DELAYED RELEASE ORAL at 06:13

## 2019-01-01 RX ADMIN — VANCOMYCIN HYDROCHLORIDE 1000 MG: 1 INJECTION, POWDER, LYOPHILIZED, FOR SOLUTION INTRAVENOUS at 08:25

## 2019-01-01 RX ADMIN — CEFEPIME HYDROCHLORIDE 1 G: 1 INJECTION, POWDER, FOR SOLUTION INTRAMUSCULAR; INTRAVENOUS at 02:36

## 2019-01-01 RX ADMIN — Medication 10 ML: at 10:14

## 2019-01-01 RX ADMIN — VANCOMYCIN HYDROCHLORIDE 750 MG: 750 INJECTION, POWDER, LYOPHILIZED, FOR SOLUTION INTRAVENOUS at 00:15

## 2019-01-01 RX ADMIN — INSULIN LISPRO 2 UNITS: 100 INJECTION, SOLUTION INTRAVENOUS; SUBCUTANEOUS at 08:50

## 2019-01-01 RX ADMIN — CLOTRIMAZOLE: 10 CREAM TOPICAL at 21:09

## 2019-01-01 RX ADMIN — ENOXAPARIN SODIUM 40 MG: 40 INJECTION SUBCUTANEOUS at 17:04

## 2019-01-01 RX ADMIN — SODIUM CHLORIDE, PRESERVATIVE FREE 10 ML: 5 INJECTION INTRAVENOUS at 10:42

## 2019-01-01 RX ADMIN — PANTOPRAZOLE SODIUM 40 MG: 40 TABLET, DELAYED RELEASE ORAL at 12:07

## 2019-01-01 RX ADMIN — INSULIN LISPRO 4 UNITS: 100 INJECTION, SOLUTION INTRAVENOUS; SUBCUTANEOUS at 12:18

## 2019-01-01 RX ADMIN — INSULIN LISPRO 1 UNITS: 100 INJECTION, SOLUTION INTRAVENOUS; SUBCUTANEOUS at 21:08

## 2019-01-01 RX ADMIN — VANCOMYCIN HYDROCHLORIDE 1250 MG: 1 INJECTION, POWDER, LYOPHILIZED, FOR SOLUTION INTRAVENOUS at 08:14

## 2019-01-01 RX ADMIN — ROSUVASTATIN CALCIUM 5 MG: 10 TABLET, FILM COATED ORAL at 22:26

## 2019-01-01 RX ADMIN — INSULIN LISPRO 2 UNITS: 100 INJECTION, SOLUTION INTRAVENOUS; SUBCUTANEOUS at 20:26

## 2019-01-01 RX ADMIN — MIDAZOLAM HYDROCHLORIDE 1 MG: 5 INJECTION INTRAMUSCULAR; INTRAVENOUS at 10:59

## 2019-01-01 RX ADMIN — INSULIN LISPRO 6 UNITS: 100 INJECTION, SOLUTION INTRAVENOUS; SUBCUTANEOUS at 11:45

## 2019-01-01 RX ADMIN — ACETAMINOPHEN 650 MG: 325 TABLET ORAL at 09:38

## 2019-01-01 RX ADMIN — INSULIN LISPRO 1 UNITS: 100 INJECTION, SOLUTION INTRAVENOUS; SUBCUTANEOUS at 16:47

## 2019-01-01 RX ADMIN — ASPIRIN 81 MG 81 MG: 81 TABLET ORAL at 08:38

## 2019-01-01 RX ADMIN — ACETAMINOPHEN 650 MG: 325 TABLET ORAL at 22:52

## 2019-01-01 RX ADMIN — PANTOPRAZOLE SODIUM 40 MG: 40 TABLET, DELAYED RELEASE ORAL at 06:57

## 2019-01-01 RX ADMIN — DEXTROSE 15 G: 15 GEL ORAL at 20:26

## 2019-01-01 RX ADMIN — VANCOMYCIN HYDROCHLORIDE 1000 MG: 1 INJECTION, POWDER, LYOPHILIZED, FOR SOLUTION INTRAVENOUS at 08:36

## 2019-01-01 RX ADMIN — INSULIN LISPRO 1 UNITS: 100 INJECTION, SOLUTION INTRAVENOUS; SUBCUTANEOUS at 12:18

## 2019-01-01 RX ADMIN — HYDROXYZINE HYDROCHLORIDE 10 MG: 10 TABLET, FILM COATED ORAL at 03:24

## 2019-01-01 RX ADMIN — CEFEPIME HYDROCHLORIDE 1 G: 1 INJECTION, POWDER, FOR SOLUTION INTRAMUSCULAR; INTRAVENOUS at 22:55

## 2019-01-01 RX ADMIN — PANTOPRAZOLE SODIUM 40 MG: 40 TABLET, DELAYED RELEASE ORAL at 06:50

## 2019-01-01 RX ADMIN — IPRATROPIUM BROMIDE AND ALBUTEROL SULFATE 1 AMPULE: .5; 3 SOLUTION RESPIRATORY (INHALATION) at 11:00

## 2019-01-01 RX ADMIN — AMLODIPINE BESYLATE 5 MG: 5 TABLET ORAL at 08:54

## 2019-01-01 RX ADMIN — ASPIRIN 81 MG 81 MG: 81 TABLET ORAL at 10:01

## 2019-01-01 RX ADMIN — MICONAZOLE NITRATE: 2 POWDER TOPICAL at 09:27

## 2019-01-01 RX ADMIN — INSULIN LISPRO 4 UNITS: 100 INJECTION, SOLUTION INTRAVENOUS; SUBCUTANEOUS at 17:07

## 2019-01-01 RX ADMIN — INSULIN LISPRO 4 UNITS: 100 INJECTION, SOLUTION INTRAVENOUS; SUBCUTANEOUS at 08:25

## 2019-01-01 RX ADMIN — CHLORHEXIDINE GLUCONATE 0.12% ORAL RINSE 15 ML: 1.2 LIQUID ORAL at 10:19

## 2019-01-01 RX ADMIN — IPRATROPIUM BROMIDE AND ALBUTEROL SULFATE 1 AMPULE: .5; 3 SOLUTION RESPIRATORY (INHALATION) at 06:40

## 2019-01-01 RX ADMIN — CLOTRIMAZOLE: 10 CREAM TOPICAL at 22:55

## 2019-01-01 RX ADMIN — Medication 10 ML: at 20:32

## 2019-01-01 RX ADMIN — INSULIN LISPRO 1 UNITS: 100 INJECTION, SOLUTION INTRAVENOUS; SUBCUTANEOUS at 12:24

## 2019-01-01 RX ADMIN — TAMSULOSIN HYDROCHLORIDE 0.4 MG: 0.4 CAPSULE ORAL at 10:01

## 2019-01-01 RX ADMIN — FUROSEMIDE 40 MG: 40 TABLET ORAL at 09:04

## 2019-01-01 RX ADMIN — HALOPERIDOL LACTATE 2 MG: 5 INJECTION INTRAMUSCULAR at 05:00

## 2019-01-01 RX ADMIN — INSULIN LISPRO 2 UNITS: 100 INJECTION, SOLUTION INTRAVENOUS; SUBCUTANEOUS at 12:35

## 2019-01-01 RX ADMIN — CEFEPIME HYDROCHLORIDE 1 G: 1 INJECTION, POWDER, FOR SOLUTION INTRAMUSCULAR; INTRAVENOUS at 10:25

## 2019-01-01 RX ADMIN — ENOXAPARIN SODIUM 40 MG: 40 INJECTION SUBCUTANEOUS at 08:28

## 2019-01-01 RX ADMIN — TAMSULOSIN HYDROCHLORIDE 0.4 MG: 0.4 CAPSULE ORAL at 09:45

## 2019-01-01 RX ADMIN — ENOXAPARIN SODIUM 30 MG: 30 INJECTION SUBCUTANEOUS at 08:50

## 2019-01-01 RX ADMIN — INSULIN LISPRO 2 UNITS: 100 INJECTION, SOLUTION INTRAVENOUS; SUBCUTANEOUS at 22:02

## 2019-01-01 RX ADMIN — AMLODIPINE BESYLATE 5 MG: 5 TABLET ORAL at 09:41

## 2019-01-01 RX ADMIN — HYDROXYZINE HYDROCHLORIDE 10 MG: 10 TABLET, FILM COATED ORAL at 09:51

## 2019-01-01 RX ADMIN — ASPIRIN 81 MG 81 MG: 81 TABLET ORAL at 09:41

## 2019-01-01 RX ADMIN — INSULIN GLARGINE 20 UNITS: 100 INJECTION, SOLUTION SUBCUTANEOUS at 21:54

## 2019-01-01 RX ADMIN — IPRATROPIUM BROMIDE AND ALBUTEROL SULFATE 1 AMPULE: .5; 3 SOLUTION RESPIRATORY (INHALATION) at 13:16

## 2019-01-01 RX ADMIN — ENOXAPARIN SODIUM 40 MG: 40 INJECTION SUBCUTANEOUS at 22:23

## 2019-01-01 RX ADMIN — INSULIN LISPRO 2 UNITS: 100 INJECTION, SOLUTION INTRAVENOUS; SUBCUTANEOUS at 16:00

## 2019-01-01 RX ADMIN — PANTOPRAZOLE SODIUM 40 MG: 40 TABLET, DELAYED RELEASE ORAL at 08:54

## 2019-01-01 RX ADMIN — TAMSULOSIN HYDROCHLORIDE 0.4 MG: 0.4 CAPSULE ORAL at 08:50

## 2019-01-01 RX ADMIN — CHLORHEXIDINE GLUCONATE 0.12% ORAL RINSE 15 ML: 1.2 LIQUID ORAL at 20:09

## 2019-01-01 RX ADMIN — INSULIN LISPRO 1 UNITS: 100 INJECTION, SOLUTION INTRAVENOUS; SUBCUTANEOUS at 21:34

## 2019-01-01 RX ADMIN — ENOXAPARIN SODIUM 40 MG: 40 INJECTION SUBCUTANEOUS at 09:45

## 2019-01-01 RX ADMIN — INSULIN LISPRO 3 UNITS: 100 INJECTION, SOLUTION INTRAVENOUS; SUBCUTANEOUS at 08:51

## 2019-01-01 RX ADMIN — Medication 10 ML: at 21:31

## 2019-01-01 RX ADMIN — HALOPERIDOL LACTATE 2 MG: 5 INJECTION INTRAMUSCULAR at 18:28

## 2019-01-01 RX ADMIN — VANCOMYCIN HYDROCHLORIDE 750 MG: 750 INJECTION, POWDER, LYOPHILIZED, FOR SOLUTION INTRAVENOUS at 21:09

## 2019-01-01 RX ADMIN — VANCOMYCIN HYDROCHLORIDE 500 MG: 500 INJECTION, POWDER, LYOPHILIZED, FOR SOLUTION INTRAVENOUS at 09:45

## 2019-01-01 RX ADMIN — IPRATROPIUM BROMIDE AND ALBUTEROL SULFATE 1 AMPULE: .5; 3 SOLUTION RESPIRATORY (INHALATION) at 07:02

## 2019-01-01 RX ADMIN — MICONAZOLE NITRATE: 2 POWDER TOPICAL at 21:41

## 2019-01-01 RX ADMIN — INSULIN LISPRO 9 UNITS: 100 INJECTION, SOLUTION INTRAVENOUS; SUBCUTANEOUS at 12:49

## 2019-01-01 RX ADMIN — TAMSULOSIN HYDROCHLORIDE 0.4 MG: 0.4 CAPSULE ORAL at 08:29

## 2019-01-01 RX ADMIN — Medication 10 ML: at 09:42

## 2019-01-01 RX ADMIN — ENOXAPARIN SODIUM 30 MG: 30 INJECTION SUBCUTANEOUS at 10:19

## 2019-01-01 RX ADMIN — CLOTRIMAZOLE: 10 CREAM TOPICAL at 09:27

## 2019-01-01 RX ADMIN — INSULIN LISPRO 1 UNITS: 100 INJECTION, SOLUTION INTRAVENOUS; SUBCUTANEOUS at 16:39

## 2019-01-01 RX ADMIN — INSULIN LISPRO 1 UNITS: 100 INJECTION, SOLUTION INTRAVENOUS; SUBCUTANEOUS at 16:29

## 2019-01-01 RX ADMIN — ENOXAPARIN SODIUM 40 MG: 40 INJECTION SUBCUTANEOUS at 17:15

## 2019-01-01 RX ADMIN — INSULIN LISPRO 3 UNITS: 100 INJECTION, SOLUTION INTRAVENOUS; SUBCUTANEOUS at 09:28

## 2019-01-01 RX ADMIN — PANTOPRAZOLE SODIUM 40 MG: 40 TABLET, DELAYED RELEASE ORAL at 09:02

## 2019-01-01 RX ADMIN — ENOXAPARIN SODIUM 30 MG: 30 INJECTION SUBCUTANEOUS at 09:26

## 2019-01-01 RX ADMIN — MICONAZOLE NITRATE: 2 POWDER TOPICAL at 21:09

## 2019-01-01 RX ADMIN — TAMSULOSIN HYDROCHLORIDE 0.4 MG: 0.4 CAPSULE ORAL at 10:19

## 2019-01-01 RX ADMIN — CEFEPIME HYDROCHLORIDE 1 G: 1 INJECTION, POWDER, FOR SOLUTION INTRAMUSCULAR; INTRAVENOUS at 10:19

## 2019-01-01 RX ADMIN — LORAZEPAM 2 MG: 2 INJECTION INTRAMUSCULAR; INTRAVENOUS at 23:32

## 2019-01-01 RX ADMIN — INSULIN GLARGINE 20 UNITS: 100 INJECTION, SOLUTION SUBCUTANEOUS at 23:13

## 2019-01-01 RX ADMIN — INSULIN LISPRO 6 UNITS: 100 INJECTION, SOLUTION INTRAVENOUS; SUBCUTANEOUS at 18:04

## 2019-01-01 RX ADMIN — CHLORHEXIDINE GLUCONATE 0.12% ORAL RINSE 15 ML: 1.2 LIQUID ORAL at 11:59

## 2019-01-01 RX ADMIN — INSULIN LISPRO 2 UNITS: 100 INJECTION, SOLUTION INTRAVENOUS; SUBCUTANEOUS at 09:15

## 2019-01-01 RX ADMIN — IPRATROPIUM BROMIDE AND ALBUTEROL SULFATE 1 AMPULE: .5; 3 SOLUTION RESPIRATORY (INHALATION) at 19:30

## 2019-01-01 RX ADMIN — PANTOPRAZOLE SODIUM 40 MG: 40 TABLET, DELAYED RELEASE ORAL at 09:41

## 2019-01-01 RX ADMIN — IPRATROPIUM BROMIDE AND ALBUTEROL SULFATE 1 AMPULE: .5; 3 SOLUTION RESPIRATORY (INHALATION) at 13:22

## 2019-01-01 RX ADMIN — PANTOPRAZOLE SODIUM 40 MG: 40 TABLET, DELAYED RELEASE ORAL at 09:40

## 2019-01-01 RX ADMIN — TAMSULOSIN HYDROCHLORIDE 0.4 MG: 0.4 CAPSULE ORAL at 20:18

## 2019-01-01 RX ADMIN — ATROPINE SULFATE 1 MG: 0.1 INJECTION, SOLUTION ENDOTRACHEAL; INTRAMUSCULAR; INTRAVENOUS; SUBCUTANEOUS at 14:18

## 2019-01-01 RX ADMIN — LORAZEPAM 1 MG: 2 INJECTION, SOLUTION INTRAMUSCULAR; INTRAVENOUS at 22:41

## 2019-01-01 RX ADMIN — ASPIRIN 81 MG 81 MG: 81 TABLET ORAL at 08:29

## 2019-01-01 RX ADMIN — DEXTROSE AND SODIUM CHLORIDE: 5; 450 INJECTION, SOLUTION INTRAVENOUS at 21:18

## 2019-01-01 RX ADMIN — Medication 25 MG: at 00:23

## 2019-01-01 RX ADMIN — Medication 10 ML: at 20:18

## 2019-01-01 RX ADMIN — ROSUVASTATIN CALCIUM 5 MG: 10 TABLET, FILM COATED ORAL at 20:45

## 2019-01-01 RX ADMIN — ENOXAPARIN SODIUM 30 MG: 30 INJECTION SUBCUTANEOUS at 10:01

## 2019-01-01 RX ADMIN — MIDAZOLAM HYDROCHLORIDE 1 MG: 5 INJECTION INTRAMUSCULAR; INTRAVENOUS at 10:19

## 2019-01-01 RX ADMIN — ROSUVASTATIN CALCIUM 5 MG: 10 TABLET, FILM COATED ORAL at 20:36

## 2019-01-01 RX ADMIN — FENTANYL CITRATE 25 MCG: 50 INJECTION, SOLUTION INTRAMUSCULAR; INTRAVENOUS at 10:37

## 2019-01-01 RX ADMIN — ASPIRIN 81 MG 81 MG: 81 TABLET ORAL at 10:40

## 2019-01-01 RX ADMIN — IPRATROPIUM BROMIDE AND ALBUTEROL SULFATE 1 AMPULE: .5; 3 SOLUTION RESPIRATORY (INHALATION) at 16:28

## 2019-01-01 RX ADMIN — IPRATROPIUM BROMIDE AND ALBUTEROL SULFATE 1 AMPULE: .5; 3 SOLUTION RESPIRATORY (INHALATION) at 06:47

## 2019-01-01 RX ADMIN — INSULIN GLARGINE 6 UNITS: 100 INJECTION, SOLUTION SUBCUTANEOUS at 20:18

## 2019-01-01 RX ADMIN — INSULIN LISPRO 2 UNITS: 100 INJECTION, SOLUTION INTRAVENOUS; SUBCUTANEOUS at 20:28

## 2019-01-01 RX ADMIN — IPRATROPIUM BROMIDE AND ALBUTEROL SULFATE 1 AMPULE: .5; 3 SOLUTION RESPIRATORY (INHALATION) at 15:36

## 2019-01-01 RX ADMIN — INSULIN GLARGINE 6 UNITS: 100 INJECTION, SOLUTION SUBCUTANEOUS at 21:08

## 2019-01-01 RX ADMIN — DEXTROSE AND SODIUM CHLORIDE: 5; 450 INJECTION, SOLUTION INTRAVENOUS at 05:31

## 2019-01-01 RX ADMIN — ROSUVASTATIN CALCIUM 5 MG: 10 TABLET, FILM COATED ORAL at 20:18

## 2019-01-01 RX ADMIN — SODIUM CHLORIDE, PRESERVATIVE FREE 10 ML: 5 INJECTION INTRAVENOUS at 22:00

## 2019-01-01 RX ADMIN — ASPIRIN 81 MG 81 MG: 81 TABLET ORAL at 08:54

## 2019-01-01 RX ADMIN — INSULIN LISPRO 4 UNITS: 100 INJECTION, SOLUTION INTRAVENOUS; SUBCUTANEOUS at 16:25

## 2019-01-01 RX ADMIN — INSULIN LISPRO 45 UNITS: 100 INJECTION, SUSPENSION SUBCUTANEOUS at 16:48

## 2019-01-01 RX ADMIN — SODIUM CHLORIDE: 9 INJECTION, SOLUTION INTRAVENOUS at 10:06

## 2019-01-01 RX ADMIN — IPRATROPIUM BROMIDE AND ALBUTEROL SULFATE 1 AMPULE: .5; 3 SOLUTION RESPIRATORY (INHALATION) at 19:11

## 2019-01-01 RX ADMIN — INSULIN GLARGINE 20 UNITS: 100 INJECTION, SOLUTION SUBCUTANEOUS at 20:09

## 2019-01-01 RX ADMIN — TAMSULOSIN HYDROCHLORIDE 0.4 MG: 0.4 CAPSULE ORAL at 08:42

## 2019-01-01 RX ADMIN — ENOXAPARIN SODIUM 30 MG: 30 INJECTION SUBCUTANEOUS at 09:04

## 2019-01-01 RX ADMIN — INSULIN LISPRO 1 UNITS: 100 INJECTION, SOLUTION INTRAVENOUS; SUBCUTANEOUS at 11:30

## 2019-01-01 RX ADMIN — INSULIN LISPRO 8 UNITS: 100 INJECTION, SOLUTION INTRAVENOUS; SUBCUTANEOUS at 11:44

## 2019-01-01 RX ADMIN — INSULIN LISPRO 6 UNITS: 100 INJECTION, SOLUTION INTRAVENOUS; SUBCUTANEOUS at 11:48

## 2019-01-01 RX ADMIN — INSULIN LISPRO 3 UNITS: 100 INJECTION, SOLUTION INTRAVENOUS; SUBCUTANEOUS at 12:04

## 2019-01-01 RX ADMIN — INSULIN LISPRO 4 UNITS: 100 INJECTION, SOLUTION INTRAVENOUS; SUBCUTANEOUS at 12:03

## 2019-01-01 RX ADMIN — ENOXAPARIN SODIUM 40 MG: 40 INJECTION SUBCUTANEOUS at 09:10

## 2019-01-01 RX ADMIN — IPRATROPIUM BROMIDE AND ALBUTEROL SULFATE 1 AMPULE: .5; 3 SOLUTION RESPIRATORY (INHALATION) at 06:51

## 2019-01-01 RX ADMIN — INSULIN LISPRO 55 UNITS: 100 INJECTION, SUSPENSION SUBCUTANEOUS at 16:22

## 2019-01-01 RX ADMIN — DEXTROSE AND SODIUM CHLORIDE: 5; 450 INJECTION, SOLUTION INTRAVENOUS at 14:21

## 2019-01-01 RX ADMIN — SODIUM CHLORIDE, PRESERVATIVE FREE 10 ML: 5 INJECTION INTRAVENOUS at 21:41

## 2019-01-01 RX ADMIN — Medication 10 ML: at 08:38

## 2019-01-01 RX ADMIN — IPRATROPIUM BROMIDE AND ALBUTEROL SULFATE 1 AMPULE: .5; 3 SOLUTION RESPIRATORY (INHALATION) at 18:58

## 2019-01-01 RX ADMIN — PRAVASTATIN SODIUM 80 MG: 80 TABLET ORAL at 10:57

## 2019-01-01 RX ADMIN — ENOXAPARIN SODIUM 40 MG: 40 INJECTION SUBCUTANEOUS at 19:00

## 2019-01-01 RX ADMIN — INSULIN LISPRO 3 UNITS: 100 INJECTION, SOLUTION INTRAVENOUS; SUBCUTANEOUS at 21:09

## 2019-01-01 RX ADMIN — INFLUENZA A VIRUS A/MICHIGAN/45/2015 X-275 (H1N1) ANTIGEN (FORMALDEHYDE INACTIVATED), INFLUENZA A VIRUS A/SINGAPORE/INFIMH-16-0019/2016 IVR-186 (H3N2) ANTIGEN (FORMALDEHYDE INACTIVATED), INFLUENZA B VIRUS B/PHUKET/3073/2013 ANTIGEN (FORMALDEHYDE INACTIVATED), AND INFLUENZA B VIRUS B/MARYLAND/15/2016 BX-69A ANTIGEN (FORMALDEHYDE INACTIVATED) 0.5 ML: 15; 15; 15; 15 INJECTION, SUSPENSION INTRAMUSCULAR at 08:37

## 2019-01-01 RX ADMIN — VANCOMYCIN HYDROCHLORIDE 1000 MG: 1 INJECTION, POWDER, LYOPHILIZED, FOR SOLUTION INTRAVENOUS at 22:39

## 2019-01-01 RX ADMIN — VANCOMYCIN HYDROCHLORIDE 750 MG: 750 INJECTION, POWDER, LYOPHILIZED, FOR SOLUTION INTRAVENOUS at 22:00

## 2019-01-01 RX ADMIN — MIDAZOLAM HYDROCHLORIDE 1 MG: 5 INJECTION INTRAMUSCULAR; INTRAVENOUS at 11:24

## 2019-01-01 RX ADMIN — CEFEPIME HYDROCHLORIDE 1 G: 1 INJECTION, POWDER, FOR SOLUTION INTRAMUSCULAR; INTRAVENOUS at 04:12

## 2019-01-01 RX ADMIN — Medication 10 ML: at 09:03

## 2019-01-01 RX ADMIN — MICONAZOLE NITRATE: 2 POWDER TOPICAL at 08:51

## 2019-01-01 RX ADMIN — SODIUM CHLORIDE 100 ML: 900 INJECTION INTRAVENOUS at 21:42

## 2019-01-01 RX ADMIN — AMLODIPINE BESYLATE 5 MG: 5 TABLET ORAL at 20:19

## 2019-01-01 RX ADMIN — LORAZEPAM 2 MG: 2 INJECTION, SOLUTION INTRAMUSCULAR; INTRAVENOUS at 21:54

## 2019-01-01 RX ADMIN — IPRATROPIUM BROMIDE AND ALBUTEROL SULFATE 1 AMPULE: .5; 3 SOLUTION RESPIRATORY (INHALATION) at 19:20

## 2019-01-01 RX ADMIN — Medication 10 ML: at 09:44

## 2019-01-01 RX ADMIN — LORAZEPAM 1 MG: 2 INJECTION, SOLUTION INTRAMUSCULAR; INTRAVENOUS at 17:50

## 2019-01-01 RX ADMIN — Medication 10 ML: at 09:41

## 2019-01-01 RX ADMIN — ASPIRIN 81 MG 81 MG: 81 TABLET ORAL at 09:02

## 2019-01-01 RX ADMIN — Medication 10 ML: at 21:01

## 2019-01-01 RX ADMIN — Medication 10 ML: at 12:19

## 2019-01-01 RX ADMIN — INSULIN LISPRO 3 UNITS: 100 INJECTION, SOLUTION INTRAVENOUS; SUBCUTANEOUS at 11:55

## 2019-01-01 RX ADMIN — INSULIN LISPRO 2 UNITS: 100 INJECTION, SOLUTION INTRAVENOUS; SUBCUTANEOUS at 16:57

## 2019-01-01 RX ADMIN — QUETIAPINE FUMARATE 25 MG: 25 TABLET ORAL at 08:54

## 2019-01-01 RX ADMIN — IPRATROPIUM BROMIDE AND ALBUTEROL SULFATE 1 AMPULE: .5; 3 SOLUTION RESPIRATORY (INHALATION) at 23:17

## 2019-01-01 RX ADMIN — ENOXAPARIN SODIUM 30 MG: 30 INJECTION SUBCUTANEOUS at 12:19

## 2019-01-01 RX ADMIN — PRAVASTATIN SODIUM 20 MG: 20 TABLET ORAL at 20:09

## 2019-01-01 RX ADMIN — INSULIN GLARGINE 20 UNITS: 100 INJECTION, SOLUTION SUBCUTANEOUS at 20:30

## 2019-01-01 RX ADMIN — INSULIN GLARGINE 6 UNITS: 100 INJECTION, SOLUTION SUBCUTANEOUS at 22:58

## 2019-01-01 RX ADMIN — PROPOFOL 200 MG: 10 INJECTION, EMULSION INTRAVENOUS at 10:13

## 2019-01-01 RX ADMIN — INSULIN GLARGINE 10 UNITS: 100 INJECTION, SOLUTION SUBCUTANEOUS at 20:25

## 2019-01-01 RX ADMIN — CEFEPIME HYDROCHLORIDE 1 G: 1 INJECTION, POWDER, FOR SOLUTION INTRAMUSCULAR; INTRAVENOUS at 04:34

## 2019-01-01 RX ADMIN — INSULIN LISPRO 3 UNITS: 100 INJECTION, SOLUTION INTRAVENOUS; SUBCUTANEOUS at 12:00

## 2019-01-01 RX ADMIN — INSULIN LISPRO 2 UNITS: 100 INJECTION, SOLUTION INTRAVENOUS; SUBCUTANEOUS at 09:25

## 2019-01-01 RX ADMIN — PRAVASTATIN SODIUM 80 MG: 80 TABLET ORAL at 09:26

## 2019-01-01 RX ADMIN — IPRATROPIUM BROMIDE AND ALBUTEROL SULFATE 1 AMPULE: .5; 3 SOLUTION RESPIRATORY (INHALATION) at 23:13

## 2019-01-01 RX ADMIN — INSULIN LISPRO 2 UNITS: 100 INJECTION, SOLUTION INTRAVENOUS; SUBCUTANEOUS at 16:56

## 2019-01-01 RX ADMIN — TAMSULOSIN HYDROCHLORIDE 0.4 MG: 0.4 CAPSULE ORAL at 12:06

## 2019-01-01 RX ADMIN — ENOXAPARIN SODIUM 30 MG: 30 INJECTION SUBCUTANEOUS at 10:42

## 2019-01-01 RX ADMIN — INSULIN LISPRO 2 UNITS: 100 INJECTION, SOLUTION INTRAVENOUS; SUBCUTANEOUS at 18:12

## 2019-01-01 RX ADMIN — SODIUM CHLORIDE 1000 ML: 9 INJECTION, SOLUTION INTRAVENOUS at 15:36

## 2019-01-01 RX ADMIN — TAMSULOSIN HYDROCHLORIDE 0.4 MG: 0.4 CAPSULE ORAL at 10:57

## 2019-01-01 RX ADMIN — Medication 10 ML: at 20:17

## 2019-01-01 RX ADMIN — IPRATROPIUM BROMIDE AND ALBUTEROL SULFATE 1 AMPULE: .5; 3 SOLUTION RESPIRATORY (INHALATION) at 22:59

## 2019-01-01 RX ADMIN — ACETAMINOPHEN 650 MG: 325 TABLET ORAL at 17:14

## 2019-01-01 RX ADMIN — CEFEPIME HYDROCHLORIDE 1 G: 1 INJECTION, POWDER, FOR SOLUTION INTRAMUSCULAR; INTRAVENOUS at 16:24

## 2019-01-01 RX ADMIN — INSULIN LISPRO 3 UNITS: 100 INJECTION, SOLUTION INTRAVENOUS; SUBCUTANEOUS at 15:55

## 2019-01-01 RX ADMIN — INSULIN GLARGINE 10 UNITS: 100 INJECTION, SOLUTION SUBCUTANEOUS at 21:08

## 2019-01-01 RX ADMIN — Medication 10 ML: at 20:20

## 2019-01-01 RX ADMIN — FENTANYL CITRATE 50 MCG: 50 INJECTION, SOLUTION INTRAMUSCULAR; INTRAVENOUS at 11:22

## 2019-01-01 RX ADMIN — VANCOMYCIN HYDROCHLORIDE 1000 MG: 1 INJECTION, POWDER, LYOPHILIZED, FOR SOLUTION INTRAVENOUS at 08:42

## 2019-01-01 RX ADMIN — MICONAZOLE NITRATE: 2 POWDER TOPICAL at 10:58

## 2019-01-01 RX ADMIN — LIDOCAINE HYDROCHLORIDE 60 MG: 20 INJECTION, SOLUTION INFILTRATION; PERINEURAL at 10:13

## 2019-01-01 RX ADMIN — HYDROXYZINE HYDROCHLORIDE 10 MG: 10 TABLET, FILM COATED ORAL at 10:19

## 2019-01-01 RX ADMIN — GABAPENTIN 100 MG: 100 CAPSULE ORAL at 09:04

## 2019-01-01 RX ADMIN — IPRATROPIUM BROMIDE AND ALBUTEROL SULFATE 1 AMPULE: .5; 3 SOLUTION RESPIRATORY (INHALATION) at 06:58

## 2019-01-01 RX ADMIN — QUETIAPINE FUMARATE 25 MG: 25 TABLET ORAL at 09:25

## 2019-01-01 RX ADMIN — ACETAMINOPHEN 1000 MG: 500 TABLET ORAL at 08:54

## 2019-01-01 RX ADMIN — ASPIRIN 81 MG 81 MG: 81 TABLET ORAL at 09:45

## 2019-01-01 RX ADMIN — AMLODIPINE BESYLATE 5 MG: 5 TABLET ORAL at 10:26

## 2019-01-01 RX ADMIN — FENTANYL CITRATE 25 MCG: 50 INJECTION, SOLUTION INTRAMUSCULAR; INTRAVENOUS at 10:12

## 2019-01-01 RX ADMIN — INSULIN LISPRO 1 UNITS: 100 INJECTION, SOLUTION INTRAVENOUS; SUBCUTANEOUS at 23:00

## 2019-01-01 RX ADMIN — VANCOMYCIN HYDROCHLORIDE 1000 MG: 1 INJECTION, POWDER, LYOPHILIZED, FOR SOLUTION INTRAVENOUS at 12:33

## 2019-01-01 RX ADMIN — GABAPENTIN 100 MG: 100 CAPSULE ORAL at 16:00

## 2019-01-01 RX ADMIN — CEFEPIME HYDROCHLORIDE 1 G: 1 INJECTION, POWDER, FOR SOLUTION INTRAMUSCULAR; INTRAVENOUS at 09:45

## 2019-01-01 RX ADMIN — FUROSEMIDE 40 MG: 40 TABLET ORAL at 14:32

## 2019-01-01 RX ADMIN — AMLODIPINE BESYLATE 5 MG: 5 TABLET ORAL at 09:25

## 2019-01-01 RX ADMIN — VANCOMYCIN HYDROCHLORIDE 750 MG: 5 INJECTION, POWDER, LYOPHILIZED, FOR SOLUTION INTRAVENOUS at 08:33

## 2019-01-01 RX ADMIN — PANTOPRAZOLE SODIUM 40 MG: 40 TABLET, DELAYED RELEASE ORAL at 09:25

## 2019-01-01 ASSESSMENT — PAIN SCALES - GENERAL
PAINLEVEL_OUTOF10: 0
PAINLEVEL_OUTOF10: 2
PAINLEVEL_OUTOF10: 0
PAINLEVEL_OUTOF10: 0
PAINLEVEL_OUTOF10: 2
PAINLEVEL_OUTOF10: 0
PAINLEVEL_OUTOF10: 8
PAINLEVEL_OUTOF10: 0
PAINLEVEL_OUTOF10: 2
PAINLEVEL_OUTOF10: 0
PAINLEVEL_OUTOF10: 5
PAINLEVEL_OUTOF10: 0
PAINLEVEL_OUTOF10: 2
PAINLEVEL_OUTOF10: 2
PAINLEVEL_OUTOF10: 0
PAINLEVEL_OUTOF10: 5
PAINLEVEL_OUTOF10: 0
PAINLEVEL_OUTOF10: 3
PAINLEVEL_OUTOF10: 0

## 2019-01-01 ASSESSMENT — PAIN SCALES - PAIN ASSESSMENT IN ADVANCED DEMENTIA (PAINAD)
CONSOLABILITY: 0
BODYLANGUAGE: 0
FACIALEXPRESSION: 0
CONSOLABILITY: 0
BODYLANGUAGE: 0
BODYLANGUAGE: 0
BREATHING: 0
TOTALSCORE: 0
NEGVOCALIZATION: 0
BREATHING: 0
FACIALEXPRESSION: 0
BODYLANGUAGE: 0
BREATHING: 0
CONSOLABILITY: 0
NEGVOCALIZATION: 0
FACIALEXPRESSION: 0
FACIALEXPRESSION: 0
BREATHING: 0

## 2019-01-01 ASSESSMENT — PULMONARY FUNCTION TESTS
PIF_VALUE: 1

## 2019-01-01 ASSESSMENT — PAIN DESCRIPTION - LOCATION
LOCATION: ARM
LOCATION: HIP
LOCATION: LEG
LOCATION: LEG
LOCATION: ARM

## 2019-01-01 ASSESSMENT — PAIN DESCRIPTION - PAIN TYPE
TYPE: ACUTE PAIN

## 2019-01-01 ASSESSMENT — ENCOUNTER SYMPTOMS
ABDOMINAL PAIN: 0
SHORTNESS OF BREATH: 0

## 2019-01-01 ASSESSMENT — PAIN DESCRIPTION - ORIENTATION
ORIENTATION: LEFT

## 2019-01-01 ASSESSMENT — PAIN SCALES - WONG BAKER
WONGBAKER_NUMERICALRESPONSE: 0
WONGBAKER_NUMERICALRESPONSE: 2

## 2019-01-01 ASSESSMENT — PAIN DESCRIPTION - FREQUENCY: FREQUENCY: CONTINUOUS

## 2019-01-01 ASSESSMENT — PAIN DESCRIPTION - DESCRIPTORS: DESCRIPTORS: SORE

## 2019-02-19 PROBLEM — S52.502A CLOSED FRACTURE OF DISTAL END OF LEFT RADIUS: Status: ACTIVE | Noted: 2019-01-01

## 2019-02-19 PROBLEM — S52.615A CLOSED NONDISPLACED FRACTURE OF STYLOID PROCESS OF LEFT ULNA: Status: ACTIVE | Noted: 2019-01-01

## 2019-02-19 PROBLEM — N18.9 CHRONIC KIDNEY DISEASE: Chronic | Status: ACTIVE | Noted: 2017-04-04

## 2019-02-19 PROBLEM — N17.9 AKI (ACUTE KIDNEY INJURY) (HCC): Status: ACTIVE | Noted: 2019-01-01

## 2019-02-19 PROBLEM — S01.01XA SCALP LACERATION: Status: ACTIVE | Noted: 2019-01-01

## 2019-02-19 PROBLEM — S52.612A TRAUMATIC CLOSED FRACTURE OF ULNAR STYLOID WITH MINIMAL DISPLACEMENT, LEFT, INITIAL ENCOUNTER: Status: ACTIVE | Noted: 2019-01-01

## 2019-03-16 PROBLEM — L02.419 CELLULITIS AND ABSCESS OF LEG: Status: ACTIVE | Noted: 2019-01-01

## 2019-03-16 PROBLEM — L03.119 CELLULITIS AND ABSCESS OF LEG: Status: ACTIVE | Noted: 2019-01-01

## 2019-05-06 NOTE — PROGRESS NOTES
CC:   Left wrist pain    HISTORY OF PRESENT ILLNESS:    Bri Peters is a  right-hand-dominant patient here for a pain in the Left wrist that began approximately 3 months ago. There was an associated injury-- and fell. He was seen in the emergency room and there was diagnosis of a wrist fracture. Patient is rehabbing at a care facility. There doesn't appear to be much history of treatment for the injury. Patient states he has been trying to take it easy. Patient's pain located in the Radial and Distal aspect of the wrist, and patient denies tenderness of the remaining fingers, hand, or elbow. Pain exacerbated with wrist flexion: mild, extension: mild, rotation: mild, lateral bending: mild and alleviated with rest. Patient denies numbness, tingling or weakness in wrist/hand. Symptoms have been persistent. Previous treatment:  Rest  Patient's biggest complaint at this point is stiffness of the left hand. Medical History:  Past Medical History:   Diagnosis Date    A-fib [facility])     Atrial fibrillation (HCC)     CAD (coronary artery disease)     Cataract     CHF (congestive heart failure) (HCC)     Chronic kidney disease     stage 3    CKD (chronic kidney disease)     Diabetes mellitus (HCC)     Femur fracture (HCC)     GERD (gastroesophageal reflux disease)     Hyperlipidemia     Hypertension     MI (myocardial infarction) ([facility] [address]      Past Surgical History:   Procedure Laterality Date    ANKLE SURGERY      APPENDECTOMY      CORONARY ARTERY BYPASS GRAFT      quadruple    CYSTOSCOPY N/A 02/08/2017    URETHRAL DILATATION    HIP SURGERY Left 07/10/2017    LEFT HIP HEMIARTHROPLASTY       UPPER GASTROINTESTINAL ENDOSCOPY  4/18/16     Family History   Problem Relation Age of Onset    Diabetes Mother     No Known Problems Father      Social History     Socioeconomic History    Marital status:       Spouse name: Not on file    Number of children: Not on file    Years of education: Not on file    Highest education level: Not on file   Occupational History    Not on file   Social Needs    Financial resource strain: Not on file    Food insecurity:     Worry: Not on file     Inability: Not on file    Transportation needs:     Medical: Not on file     Non-medical: Not on file   Tobacco Use    Smoking status: Former Smoker     Packs/day: 0.50     Years: 15.00     Pack years: 7.50    Smokeless tobacco: Current User     Types: Chew   Substance and Sexual Activity    Alcohol use: No    Drug use: No    Sexual activity: Never   Lifestyle    Physical activity:     Days per week: Not on file     Minutes per session: Not on file    Stress: Not on file   Relationships    Social connections:     Talks on phone: Not on file     Gets together: Not on file     Attends Islam service: Not on file     Active member of club or organization: Not on file     Attends meetings of clubs or organizations: Not on file     Relationship status: Not on file    Intimate partner violence:     Fear of current or ex partner: Not on file     Emotionally abused: Not on file     Physically abused: Not on file     Forced sexual activity: Not on file   Other Topics Concern    Not on file   Social History Narrative    Not on file     Current Outpatient Medications   Medication Sig Dispense Refill    hydrOXYzine (ATARAX) 10 MG tablet Take 10 mg by mouth three times daily      gabapentin (NEURONTIN) 100 MG capsule Take 100 mg by mouth 3 times daily. [place] Marquis betamethasone dipropionate (DIPROLENE) 0.05 % cream Apply topically 2 times daily Apply topically 2 times daily.       acetaminophen (TYLENOL) 325 MG tablet Take 650 mg by mouth every 6 hours as needed for Pain      magnesium hydroxide (MILK OF MAGNESIA) 400 MG/5ML suspension Take 30 mLs by mouth daily as needed for Constipation      insulin aspart protamine-insulin aspart (NOVOLOG MIX 70/30) (70-30) 100 UNIT/ML injection Inject 10 Units into the skin 2 times daily (with meals) 65 units in am, 45 units in pm (Patient taking differently: Inject 65 Units into the skin daily 65 units in am, 55 units in pm) 1 vial 0    omeprazole (PRILOSEC) 20 MG delayed release capsule Take 40 mg by mouth daily      nitroGLYCERIN (NITROSTAT) 0.4 MG SL tablet Place 0.4 mg under the tongue every 5 minutes as needed for Chest pain Up to 3 tablets. If chest pain continues call 911.  tamsulosin (FLOMAX) 0.4 MG capsule Take 0.4 mg by mouth daily       pravastatin (PRAVACHOL) 80 MG tablet Take 80 mg by mouth daily. No current facility-administered medications for this visit. Allergies   Allergen Reactions    Oxycodone      Other reaction(s): delirium    Penicillins     Percocet [Oxycodone-Acetaminophen] Other (See Comments)     Pt. States \"I was delusional when I took that. \"     Warfarin Itching    Neomycin-Bacitracin Zn-Polymyx Rash       ROS:  ROS neg except for positives in HPI  Here with his daughter-in-law  PHYSICAL EXAMINATION:    Gen/Psych: Examination reveals a pleasant individual in no acute distress. The patient is oriented to time, place and person. The patient's mood and affect are appropriate. Lymph: The lymphatic examination bilaterally reveals all areas to be without enlargement or induration. Skin intact without lymphadenopathy, discoloration, or abnormal temperature. Vascular: There is intact, symmetric circulation in both upper extremities. Musculoskeletal       Cervical spine, shoulders, elbows, digits:    satisfactory pain-free range of motion,                                                                           strength, and stability       Left wrist:  Pain to palpation in the Radial and Distal wrist, mild. Good alignment clinically. Compartments soft. No ecchymosis. No instability with pressure. There is some palpable bony callus, osteophyte or enlargement dorsally. Extensor tendons are functioning well this point including EPL. Swelling is  present. Mild global about the wrist.  Mild swelling into the hand. Strength and ROM limited by pain. No pain or swelling and full ROM of other digits/hand  There is not clinical evidence of skeletal deformity or mal-rotation. Neurological:  Essentially baseline normal     DIAGNOSTIC TESTING:    X-rays obtained today:  3 views, left wrist, impression:  Healed distal radius fracture, mild dorsal angulated malunion      X-rays reviewed in office:  ( Feb 2019)   EXAMINATION:   THREE XRAY VIEWS OF THE LEFT ELBOW; TWO VIEWS OF THE CHEST; THREE XRAY VIEWS   OF THE LEFT WRIST       2/19/2019 8:39 am; 2/19/2019 7:54 am       COMPARISON:   09/25/2017, 04/06/2016       HISTORY:   ORDERING SYSTEM PROVIDED HISTORY: fall, L wrist/arm pain   TECHNOLOGIST PROVIDED HISTORY:   Reason for exam:->fall, L wrist/arm pain   Ordering Physician Provided Reason for Exam: fall, pain   Acuity: Acute   Type of Exam: Initial; ORDERING SYSTEM PROVIDED HISTORY: fall   TECHNOLOGIST PROVIDED HISTORY:   Reason for exam:->fall   Ordering Physician Provided Reason for Exam: fall, ams   Acuity: Acute   Type of Exam: Initial       FINDINGS:   Frontal and lateral views of the chest were performed. Vicie Pastel is no acute   osseous abnormality.  There is degenerative change throughout the thoracic   spine and involving the bilateral shoulders.  The patient is status post   median sternotomy.  There is stable chronic nonspecific elevation of the left   hemidiaphragm.  The heart size is mildly enlarged, though stable.  The   mediastinal contours are stable.  There is increased opacity along the right   paratracheal stripe, likely representing normal vascular anatomy, stable and   unchanged from prior studies.  There is mild interstitial pulmonary edema.    No focus of acute airspace consolidation is identified. Vicie Pastel is no evidence   of a pneumothorax or pleural effusion.       Three views of the left wrist were performed. Vicie Pastel is an acute

## 2019-05-17 PROBLEM — J18.9 HCAP (HEALTHCARE-ASSOCIATED PNEUMONIA): Status: ACTIVE | Noted: 2019-01-01

## 2019-05-17 NOTE — ED PROVIDER NOTES
[address] ED  eMERGENCY dEPARTMENT eNCOUnter        Pt Name: Avi Freeman  MRN: [id number]  [place] 6/14/1931  Date of evaluation: 5/17/2019  Provider: KAILEE Richmond - CNP  PCP: Miracle Sepulveda MD  ED Attending: No att. providers found    [address] [facility]       Chief Complaint   Patient presents with    Hypoglycemia     pt arrived via ems from The [address] [place] [address] for a low blood sugar. pt's fsbs today was 51 at the nursing home, pt was given glucagon at the nursing home. per ems, the nursing home stated that pt has been refusing to eat today. HISTORY OF PRESENT ILLNESS   (Location/Symptom, Timing/Onset, Context/Setting, Quality, Duration, Modifying Factors, Severity)  Note limiting factors. Avi Freeman is a 80 y.o. male for low blood sugar. Onset was today. Duration has been since the onset. Context includes pt was brought in from the nursing home today for a low blood sugar. Alleviating factors include nothing. Aggravating factors include nothing. Pain is 0/10. nothing has been used for pain today. Nursing Notes were all reviewed and agreed with or any disagreements were addressed  in the HPI. REVIEW OF SYSTEMS  (2-9 systems for level 4, 10 or more for level 5)     Review of Systems   Constitutional: Negative for fever. Pt is not sure why he is here    Respiratory: Negative for shortness of breath. Cardiovascular: Negative for chest pain. Gastrointestinal: Negative for abdominal pain. Genitourinary: Negative for difficulty urinating. All other systems reviewed and are negative. Positivesand Pertinent negatives as per HPI. Except as noted above in the ROS, all other systems were reviewed and negative.        PAST MEDICAL HISTORY     Past Medical History:   Diagnosis Date    A-fib [facility])     Atrial fibrillation (HCC)     CAD (coronary artery disease)     Cataract     CHF (congestive heart failure) (HCC)     Chronic kidney disease stage 3    CKD (chronic kidney disease)     Diabetes mellitus ([facility] [address] [facility]     Femur fracture (HCC)     GERD (gastroesophageal reflux disease)     Hyperlipidemia     Hypertension     MI (myocardial infarction) ([facility] [address]          SURGICAL HISTORY       Past Surgical History:   Procedure Laterality Date    ANKLE SURGERY      APPENDECTOMY      CORONARY ARTERY BYPASS GRAFT      quadruple    CYSTOSCOPY N/A 02/08/2017    URETHRAL DILATATION    HIP SURGERY Left 07/10/2017    LEFT HIP HEMIARTHROPLASTY       UPPER GASTROINTESTINAL ENDOSCOPY  4/18/16         CURRENT MEDICATIONS       Previous Medications    ACETAMINOPHEN (TYLENOL) 325 MG TABLET    Take 650 mg by mouth every 6 hours as needed for Pain    BETAMETHASONE DIPROPIONATE (DIPROLENE) 0.05 % CREAM    Apply topically 2 times daily Apply topically 2 times daily. DOXYCYCLINE HYCLATE (VIBRAMYCIN) 100 MG CAPSULE    Take 100 mg by mouth 2 times daily    GABAPENTIN (NEURONTIN) 100 MG CAPSULE    Take 300 mg by mouth daily. HYDROXYZINE (ATARAX) 10 MG TABLET    Take 25 mg by mouth three times daily     INSULIN ASPART PROTAMINE-INSULIN ASPART (NOVOLOG MIX 70/30) (70-30) 100 UNIT/ML INJECTION    Inject 10 Units into the skin 2 times daily (with meals) 65 units in am, 45 units in pm    INSULIN LISPRO (HUMALOG) 100 UNIT/ML INJECTION VIAL    Inject into the skin 3 times daily (before meals) Sliding scale    MAGNESIUM HYDROXIDE (MILK OF MAGNESIA) 400 MG/5ML SUSPENSION    Take 30 mLs by mouth daily as needed for Constipation    NITROGLYCERIN (NITROSTAT) 0.4 MG SL TABLET    Place 0.4 mg under the tongue every 5 minutes as needed for Chest pain Up to 3 tablets. If chest pain continues call 911. OMEPRAZOLE (PRILOSEC) 20 MG DELAYED RELEASE CAPSULE    Take 40 mg by mouth daily    PANTOPRAZOLE (PROTONIX) 40 MG TABLET    Take 40 mg by mouth daily    PRAVASTATIN (PRAVACHOL) 80 MG TABLET    Take 80 mg by mouth daily.     ROSUVASTATIN (CRESTOR) 5 MG TABLET    Take 5 mg by mouth BP Temp Temp Source Pulse Resp SpO2 Height Weight   131/70 100.2 °F (37.9 °C) Oral 76 18 99 % 6' 1\" (1.854 m) 250 lb (113.4 kg)       Physical Exam   Constitutional:   Elderly male   HENT:   Head: Normocephalic and atraumatic. Eyes: Pupils are equal, round, and reactive to light. Neck: Normal range of motion. Cardiovascular: Normal rate. Pulmonary/Chest: Effort normal. No stridor. No respiratory distress. He has no wheezes. He has no rales. Abdominal: Soft. Bowel sounds are normal. He exhibits no distension. There is no tenderness. Musculoskeletal: Normal range of motion. Neurological: He is alert. Oriented to person    Skin: Skin is warm and dry. Psychiatric: He has a normal mood and affect.        DIAGNOSTIC RESULTS   LABS:    Labs Reviewed   CBC WITH AUTO DIFFERENTIAL - Abnormal; Notable for the following components:       Result Value    WBC 17.0 (*)     Hemoglobin 13.1 (*)     Hematocrit 39.8 (*)     Platelets 818 (*)     Neutrophils # 12.8 (*)     Monocytes # 1.7 (*)     Poikilocytes Occasional (*)     All other components within normal limits    Narrative:     Performed at:  [facility],  [facility]   Phone [phone]   COMPREHENSIVE METABOLIC PANEL W/ REFLEX TO MG FOR LOW K - Abnormal; Notable for the following components:    BUN 26 (*)     CREATININE 2.1 (*)     GFR Non- 30 (*)     GFR  36 (*)     All other components within normal limits    Narrative:     Performed at:  [facility]) [facility],  [place] [facility]   Phone [phone]   URINE RT REFLEX TO CULTURE - Abnormal; Notable for the following components:    Protein, UA 30 (*)     All other components within normal limits    Narrative:     Performed at:  [facility],  [employer], [place] [employer] [employer]   Phone [phone]   TROPONIN - Abnormal; Notable for the following components:    Troponin 0.03 (*)     All other components within normal limits    Narrative:     Performed at:  [facility] [address],  [place]   Phone [phone]   PROTIME-INR - Abnormal; Notable for the following components:    Protime 14.3 (*)     INR 1.25 (*)     All other components within normal limits    Narrative:     Performed at:  [place] [address],  [place]   Phone [phone]   BRAIN NATRIURETIC PEPTIDE - Abnormal; Notable for the following components:    Pro-BNP 1,346 (*)     All other components within normal limits    Narrative:     Performed at:  [place] [address],  [place]   Phone [phone]   MICROSCOPIC URINALYSIS - Abnormal; Notable for the following components:    Mucus, UA 1+ (*)     All other components within normal limits    Narrative:     Performed at:  [place] [address],  [place]   Phone [phone]   POCT GLUCOSE - Abnormal; Notable for the following components:    POC Glucose 51 (*)     All other components within normal limits    Narrative:     Performed at:  [facility] [employer],  [place]   Phone [phone]   POCT GLUCOSE - Abnormal; Notable for the following components:    POC Glucose 119 (*)     All other components within normal limits    Narrative:     Performed at:  [facility] [address],  [place]   Phone [phone]   CULTURE BLOOD #1   CULTURE BLOOD #2   LACTIC ACID, PLASMA    Narrative:     Performed at:  [facility] [address],  [place]   Phone [phone]   LACTATE, SEPSIS   LACTATE, SEPSIS   POCT GLUCOSE    Narrative:     Performed at:  [facility]) - (electronically signed)       Mo Hinds, KAILEE - KARISHMA  05/17/19 [id number]

## 2019-05-17 NOTE — PLAN OF CARE
Nursing home patient   sent for hypoglycemia, oral intake has been poor. Received glucagon in the nursing home and D50 in the ER. Patient noted to have pneumonia, low-grade fevers and white count elevated, chest x-ray with infiltrates.     HCAP - placed on vancomycin and cefepime    Troponin 0.03, repeat

## 2019-05-17 NOTE — ED NOTES
Chief Complaint   Patient presents with    Hypoglycemia     pt arrived via ems from The [address] [place] [address] for a low blood sugar. pt's fsbs today was 51 at the nursing home, pt was given glucagon at the nursing home. per ems, the nursing home stated that pt has been refusing to eat today. Pt placed in room 11. Placed on cardiac monitor, afib noted. Bed in low position, call light in reach. Will continue to monitor.       Ryan Strickland RN  05/17/19 [id number]

## 2019-05-17 NOTE — ED PROVIDER NOTES
I independently performed a history and physical on Giovanni Delgadillo. All diagnostic, treatment, and disposition decisions were made by myself in conjunction with the advanced practice provider. Briefly, this is a 80 y.o. male here for altered mental status and low blood sugar. The patient was found ambulatory 51 at the nursing home today. On exam, patient had a benign physical examination, but he did demonstrate signs of infection, sent have a UTI. We feel the patient's going to require admission for IV antibiotics    For further details of [facility] emergency department encounter, please see documentation by advanced practice provider  Luis Kovacs.         Tiffany Putnam MD  05/17/19 1318

## 2019-05-17 NOTE — ED NOTES
Report given to Donnie, [facility].       Morena Tuttle, SHABANA  05/17/19 [id number]

## 2019-05-18 NOTE — PROGRESS NOTES
Shift assessment completed:    Alert and Oriented x3-4. Vitals are WNL. Respiratory status is regular, easy, unlabored, and SpO2 is 95% on RA. Pain is 0/10. Pt denies any other complications at this time. SR up 2/4. Bed in lowest position. Call light within reach. Bed alarm is on. Will monitor.

## 2019-05-18 NOTE — PLAN OF CARE
Problem: Nutrition  Intervention: Swallowing evaluation  Note:   Bedside swallow evaluation completed this date. All further notes may be found in EMR.     Adenike Jackson MA, CCC-SLP  Speech Language Pathologist

## 2019-05-18 NOTE — CONSULTS
insulin lispro (HUMALOG) injection vial 0-3 Units, 0-3 Units, Subcutaneous, Nightly  [START ON 5/19/2019] enoxaparin (LOVENOX) injection 30 mg, 30 mg, Subcutaneous, Daily  sodium chloride flush 0.9 % injection 10 mL, 10 mL, Intravenous, 2 times per day  sodium chloride flush 0.9 % injection 10 mL, 10 mL, Intravenous, PRN  magnesium hydroxide (MILK OF MAGNESIA) 400 MG/5ML suspension 30 mL, 30 mL, Oral, Daily PRN  ondansetron (ZOFRAN) injection 4 mg, 4 mg, Intravenous, Q6H PRN  glucose (GLUTOSE) 40 % oral gel 15 g, 15 g, Oral, PRN  dextrose 50 % solution 12.5 g, 12.5 g, Intravenous, PRN  glucagon (rDNA) injection 1 mg, 1 mg, Intramuscular, PRN  dextrose 5 % solution, 100 mL/hr, Intravenous, PRN  acetaminophen (TYLENOL) tablet 650 mg, 650 mg, Oral, Q4H PRN  cefepime (MAXIPIME) 1 g IVPB minibag, 1 g, Intravenous, Q12H  vancomycin (VANCOCIN) intermittent dosing (placeholder), , Other, RX Placeholder  Prior to Admission medications    Medication Sig Start Date End Date Taking? Authorizing Provider   doxycycline hyclate (VIBRAMYCIN) 100 MG capsule Take 100 mg by mouth 2 times daily 5/15/19 5/23/19 Yes Historical Provider, MD   insulin lispro (HUMALOG) 100 UNIT/ML injection vial Inject into the skin 3 times daily (before meals) Sliding scale   Yes Historical Provider, MD   pantoprazole (PROTONIX) 40 MG tablet Take 40 mg by mouth daily   Yes Historical Provider, MD   rosuvastatin (CRESTOR) 5 MG tablet Take 5 mg by mouth nightly   Yes Historical Provider, MD   traMADol (ULTRAM) 50 MG tablet Take 50 mg by mouth every 6 hours as needed for Pain. Yes Historical Provider, MD   hydrOXYzine (ATARAX) 10 MG tablet Take 25 mg by mouth three times daily    Yes Historical Provider, MD   gabapentin (NEURONTIN) 100 MG capsule Take 300 mg by mouth daily. Yes Historical Provider, MD   betamethasone dipropionate (DIPROLENE) 0.05 % cream Apply topically 2 times daily Apply topically 2 times daily.    Yes Historical Provider, MD magnesium hydroxide (MILK OF MAGNESIA) 400 MG/5ML suspension Take 30 mLs by mouth daily as needed for Constipation   Yes Historical Provider, MD   insulin aspart protamine-insulin aspart (NOVOLOG MIX 70/30) (70-30) 100 UNIT/ML injection Inject 10 Units into the skin 2 times daily (with meals) 65 units in am, 45 units in pm  Patient taking differently: Inject 65 Units into the skin every evening 65 units in am, 45 units in pm 7/13/17  Yes No Duran MD   nitroGLYCERIN (NITROSTAT) 0.4 MG SL tablet Place 0.4 mg under the tongue every 5 minutes as needed for Chest pain Up to 3 tablets. If chest pain continues call 911. Yes Historical Provider, MD   tamsulosin (FLOMAX) 0.4 MG capsule Take 0.4 mg by mouth daily    Yes Historical Provider, MD   acetaminophen (TYLENOL) 325 MG tablet Take 650 mg by mouth every 6 hours as needed for Pain    Historical Provider, MD   omeprazole (PRILOSEC) 20 MG delayed release capsule Take 40 mg by mouth daily    Historical Provider, MD   pravastatin (PRAVACHOL) 80 MG tablet Take 80 mg by mouth daily. Historical Provider, MD        Allergies:  Oxycodone; Penicillins; Percocet [oxycodone-acetaminophen]; Warfarin; and Neomycin-bacitracin zn-polymyx    Social History:   TOBACCO:   reports that he has quit smoking. He has a 7.50 pack-year smoking history. His smokeless tobacco use includes chew. ETOH:   reports that he does not drink alcohol. DRUGS:   reports that he does not use drugs. Family History:        Problem Relation Age of Onset    Diabetes Mother     No Known Problems Father        REVIEW OF SYSTEMS:  CONSTITUTIONAL:  positive for  fatigue and malaise  HEENT:  negative  RESPIRATORY:  negative  CARDIOVASCULAR:  negative  GASTROINTESTINAL:  negative  GENITOURINARY:  negative  HEMATOLOGIC/LYMPHATIC:  negative  NEUROLOGICAL:  Negative  * All other ROS reviewed and negative.        PHYSICAL EXAM:  VITALS:  BP (!) 155/65   Pulse 78   Temp 98.7 °F (37.1 °C) (Oral)   Resp

## 2019-05-18 NOTE — PROGRESS NOTES
RESPIRATORY THERAPY ASSESSMENT    Name:  [id number] [facility] Record Number:  [id number]  Age: 80 y.o. Gender: male  : 1931  Today's Date:  2019  Room:  [address] [place] [address]-    Assessment     Is the patient being admitted for a COPD or Asthma exacerbation? No   (If yes the patient will be seen every 4 hours for the first 24 hours and then reassessed)    Patient Admission Diagnosis      Allergies  Allergies   Allergen Reactions    Oxycodone      Other reaction(s): delirium    Penicillins     Percocet [Oxycodone-Acetaminophen] Other (See Comments)     Pt. States \"I was delusional when I took that. \"     Warfarin Itching    Neomycin-Bacitracin Zn-Polymyx Rash       Minimum Predicted Vital Capacity:     1251          Actual Vital Capacity:      870 (poor Pt. Effort)              Pulmonary History:CHF/Pulmonary Edema  Home Oxygen Therapy:  room air  Home Respiratory Therapy:None   Current Respiratory Therapy:  Duoneb Q4 WA  Treatment Type: HHN  Medications: Albuterol/Ipratropium    Respiratory Severity Index(RSI)   Patients with orders for inhalation medications, oxygen, or any therapeutic treatment modality will be placed on Respiratory Protocol. They will be assessed with the first treatment and at least every 72 hours thereafter. The following severity scale will be used to determine frequency of treatment intervention.     Smoking History: Smoking History Less than 1ppd or less than 15 pack year = 1    Social History  Social History     Tobacco Use    Smoking status: Former Smoker     Packs/day: 0.50     Years: 15.00     Pack years: 7.50    Smokeless tobacco: Current User     Types: Chew   Substance Use Topics    Alcohol use: No    Drug use: No       Recent Surgical History: None = 0  Past Surgical History  Past Surgical History:   Procedure Laterality Date    ANKLE SURGERY      APPENDECTOMY      CORONARY ARTERY BYPASS GRAFT      quadruple    CYSTOSCOPY N/A 2017    URETHRAL DILATATION    HIP SURGERY Left 07/10/2017    LEFT HIP HEMIARTHROPLASTY       UPPER GASTROINTESTINAL ENDOSCOPY  4/18/16       Level of Consciousness: Lethargic = 3    Level of Activity: Mostly sedentary, minimal walking = 2    Respiratory Pattern: Regular Pattern; RR 8-20 = 0    Breath Sounds: Diminshed bilaterally and/or crackles = 2    Sputum   ,  ,    Cough: Strong, spontaneous, non-productive = 0    Vital Signs   BP (!) 188/48   Pulse 74   Temp 99.1 °F (37.3 °C) (Oral)   Resp 16   Ht 6' 1\" (1.854 m)   Wt 250 lb (113.4 kg)   SpO2 96%   BMI 32.98 kg/m²   SPO2 (COPD values may differ): Greater than or equal to 92% on room air = 0    Peak Flow (asthma only): not applicable = 0    RSI: 7-8 = BID and Q4HPRN (every four hours as needed) for dyspnea        Plan       Goals: medication delivery, mobilize retained secretions, volume expansion and improve oxygenation    Patient/caregiver was educated on the proper method of use for Respiratory Care Devices:  Yes      Level of patient/caregiver understanding able to:   ? Verbalize understanding   ? Demonstrate understanding       ? Teach back        ? Needs reinforcement       ? No available caregiver               ? Other:     Response to education:  Good     Is patient being placed on Home Treatment Regimen? No     Does the patient have everything they need prior to discharge? NA     Comments: pt assessed & chart reviewed    Plan of Care: Duoneb Q4 WA, Duoneb Q4 PRN    Electronically signed by Dorinda Reddy RCP on 5/18/2019 at 12:02 AM    Respiratory Protocol Guidelines     1. Assessment and treatment by Respiratory Therapy will be initiated for medication and therapeutic interventions upon initiation of aerosolized medication. 2. Physician will be contacted for respiratory rate (RR) greater than 35 breaths per minute. Therapy will be held for heart rate (HR) greater than 140 beats per minute, pending direction from physician.   3. Bronchodilators will be administered via Metered Dose Inhaler (MDI) with spacer when the following criteria are met:  a. Alert and cooperative     b. HR < 140 bpm  c. RR < 30 bpm                d. Can demonstrate a 2-3 second inspiratory hold  4. Bronchodilators will be administered via Hand Held Nebulizer AIMEE [facility]) to patients when ANY of the following criteria are met  a. Incognizant or uncooperative          b. Patients treated with HHN at Home        c. Unable to demonstrate proper use of MDI with spacer     d. RR > 30 bpm   5. Bronchodilators will be delivered via Metered Dose Inhaler (MDI), HHN, Aerogen to intubated patients on mechanical ventilation. 6. Inhalation medication orders will be delivered and/or substituted as outlined below. Aerosolized Medications Ordering and Administration Guidelines:    1. All Medications will be ordered by a physician, and their frequency and/or modality will be adjusted as defined by the patients Respiratory Severity Index (RSI) score. 2. If the patient does not have documented COPD, consider discontinuing anticholinergics when RSI is less than 9.  3. If the bronchospasm worsens (increased RSI), then the bronchodilator frequency can be increased to a maximum of every 4 hours. If greater than every 4 hours is required, the physician will be contacted. 4. If the bronchospasm improves, the frequency of the bronchodilator can be decreased, based on the patient's RSI, but not less than home treatment regimen frequency. 5. Bronchodilator(s) will be discontinued if patient has a RSI less than 9 and has received no scheduled or as needed treatment for 72  Hrs. Patients Ordered on a Mucolytic Agent:    1. Must always be administered with a bronchodilator. 2. Discontinue if patient experiences worsened bronchospasm, or secretions have lessened to the point that the patient is able to clear them with a cough. Anti-inflammatory and Combination Medications:    1.  If the patient lacks prior history of lung disease, is not using inhaled anti-inflammatory medication at home, and lacks wheezing by examination or by history for at least 24 hours, contact physician for possible discontinuation.

## 2019-05-18 NOTE — DISCHARGE INSTR - COC
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    Hx total left hip arthroplasty (July 2017) Y44.285    Chronic atrial fibrillation (HCC) I48.2    Frequent falls R29.6    Chronically elevated INR R79.1    Elevated brain natriuretic peptide (BNP) level R79.89    Debility R53.81    Former smoker Z87.891    Disorientation R41.0    Somnolence R40.0    Infected decubitus ulcer, unstageable (HCC) L89.95, L08.9    Abscess L02.91    ELEANOR (acute kidney injury) ([facility] [address] N17.9    Closed fracture of distal end of left radius S52.502A    Closed nondisplaced fracture of styloid process of left ulna S52.615A    Scalp laceration S01. 01XA    Multiple skin tears T14. 8XXA    Cellulitis and abscess of leg L03.119, L02.419    HCAP (healthcare-associated pneumonia) J18.9       Isolation/Infection:   Isolation          No Isolation            Nurse Assessment:  Last Vital Signs: BP (!) 138/57   Pulse 88   Temp 99.1 °F (37.3 °C) (Oral)   Resp 16   Ht 6' 1\" (1.854 m)   Wt 239 lb 6.4 oz (108.6 kg)   SpO2 94%   BMI 31.59 kg/m²     Last documented pain score (0-10 scale):    Last Weight:   Wt Readings from Last 1 Encounters:   05/18/19 239 lb 6.4 oz (108.6 kg)     Mental Status:  disoriented and alert    IV Access:  - None    Nursing Mobility/ADLs:  Walking   Assisted  Transfer  Assisted  Bathing  Assisted  Dressing  Assisted  Toileting  Assisted  Feeding  Assisted  Med Admin  Assisted  Med Delivery   crushed    Wound Care Documentation and Therapy:  Pressure Ulcer 01/06/18 Leg Right;Lateral Stage 3 (Active)   Number of days: 496       Pressure Ulcer 01/06/18 Buttocks Right;Medial Stage 3 (Active)   Number of days: 496       Wound 02/19/19 Coccyx stage 1 (Active)   Number of days: 88        Elimination:  Continence:   · Bowel: No  · Bladder: incontinent at times, able to use urinal  Urinary Catheter: None   Colostomy/Ileostomy/Ileal Conduit: No       Date of Last BM: 5/26/19    Intake/Output Summary (Last 24 hours) at 5/18/2019 1331  Last data filed at 5/18/2019 1249  Gross per 24 hour   Intake --   Output 1425 ml   Net -1425 ml     I/O last 3 completed shifts:  In: -   Out: 850 [Urine:850]    Safety Concerns: At Risk for Falls    Impairments/Disabilities:      None and Hearing    Nutrition Therapy:  Current Nutrition Therapy:   - Oral Diet:  Dental Soft    Routes of Feeding: Oral  Liquids: Nectar Thick Liquids  Daily Fluid Restriction: no  Last Modified Barium Swallow with Video (Video Swallowing Test): not done    Treatments at the Time of Hospital Discharge:   Respiratory Treatments: ***  Oxygen Therapy:  is not on home oxygen therapy.   Ventilator:    - No ventilator support    Rehab Therapies: Physical Therapy, Occupational Therapy and skilled nursing  Weight Bearing Status/Restrictions: No weight bearing restirctions  Other Medical Equipment (for information only, NOT a DME order):  {EQUIPMENT:[id number]}  Other Treatments: ***    Patient's personal belongings (please select all that are sent with patient):  None    RN SIGNATURE:  Electronically signed by Andra Streeter RN on 5/26/19 at 6:51 PM    CASE MANAGEMENT/SOCIAL WORK SECTION    Inpatient Status Date: 5/17/18    Readmission Risk Assessment Score:  Readmission Risk              Risk of Unplanned Readmission:        23           Discharging to Facility/ Agency   · Name: [facility]  · Address: [address]  · Phone: [phone]  · Fax: [phone]    / signature: Electronically signed by Charlette Christina RN on 5/26/19 at 3:40 PM    PHYSICIAN SECTION    Prognosis: Good    Condition at Discharge: Stable    Rehab Potential (if transferring to Rehab): Good    Recommended Labs or Other Treatments After Discharge:     Physician Certification: I certify the above information and transfer of Luis Houser  is necessary for the continuing treatment of the diagnosis listed and that he requires Located within [facility] for greater 30 days.      Update Admission H&P: No change in H&P    PHYSICIAN SIGNATURE:  Electronically signed by Kamila Walters MD on 5/26/19 at 4:11 PM

## 2019-05-18 NOTE — PROGRESS NOTES
Speech Language Pathology  Facility/Department: [facility] MEDICAL-SURGICAL   CLINICAL BEDSIDE SWALLOW EVALUATION    NAME: Bri Peters  : 1931  MRN: [id number]     Recommend Dental soft food / Nectar thick liquids--no straws until MBSS can be completed. Pt currently with PNA, mildly elevated WBC per MD note, CXR with infiltrate and recent poor oral intake,   recommend instrumental evaluation to determine least restrictive diet and rule out aspiration. ADMISSION DATE: 2019  ADMITTING DIAGNOSIS: has Acute encephalopathy; Delirium; Coronary artery disease involving native heart without angina pectoris; Essential hypertension; Chronic kidney disease; Cellulitis of buttock; Atrial fibrillation with slow ventricular response ([address] Venous ulcers of bilateral legs; Acute hyperglycemia; GERD (gastroesophageal reflux disease); BPH (benign prostatic hyperplasia); Type 2 diabetes mellitus with chronic kidney disease, without long-term current use of insulin ([address] Hx total left hip arthroplasty (2017); Chronic atrial fibrillation ([address] Frequent falls; Chronically elevated INR; Elevated brain natriuretic peptide (BNP) level; Debility; Former smoker; Disorientation; Somnolence; Infected decubitus ulcer, unstageable ([address] Abscess; ELEANOR (acute kidney injury) ([address] Closed fracture of distal end of left radius; Closed nondisplaced fracture of styloid process of left ulna; Scalp laceration; Multiple skin tears; Cellulitis and abscess of leg; and HCAP (healthcare-associated pneumonia) on their problem list.  ONSET DATE: 2019    Recent Chest Xray/CT of Chest: (2019)  Impression   Cardiomegaly and mild bilateral congestion with left basilar infiltrate   representing atelectasis versus pneumonia.        Date of Eval: 2019  Evaluating Therapist: Reina De La Torre    Current Diet level:  Current Diet : Regular  Current Liquid Diet : Thin    Primary Complaint  Patient Complaint: Pills Strategies: Assist feed;Small bites/sips;Eat/Feed slowly; No straws;Remain upright for 30-45 minutes after meals;Upright as possible for all oral intake    Treatment/Goals  Short-term Goals  Timeframe for Short-term Goals: 3-5x  Goal 1: Pt will tolerate dental soft foods with no clinical s/s of aspiration in 10/10 trials. Goal 2: Pt will tolerate nectar thick liquids with no clinical s/s of aspiration in 10/10 trials. Goal 3: Pt will demonstrate understanding of recommended safe swallow strategies with 100% accuracy given min cues. Goal 4: Pt will tolerate instrumental swallow procedure. Long-term Goals  Timeframe for Long-term Goals: 1 week  Goal 1: Pt will tolerate safest and least restrictive diet with no clinical s/s of aspiration. General  Chart Reviewed: Yes  Comments: Spoke with caregivers who reported that pt generally eats regular foods and thin liquids. Recent poor oral intake, current PNA with mildly elevated WBC, CXR with infiltrate. Subjective  Subjective: Pt alert and confused. Behavior/Cognition: Alert; Cooperative;Confused  Respiratory Status: Room air  O2 Device: None (Room air)  Follows Directions: Simple  Dentition: Edentulous  Patient Positioning: Upright in bed  Baseline Vocal Quality: Dysphonic  Volitional Cough: Strong  Prior Dysphagia History: Pt with no hx of dysphagia. Consistencies Administered: Puree; Thin - teaspoon; Thin - cup;Nectar - teaspoon;Nectar - cup;Honey - cup;Honey - teaspoon;Mechanical soft; Soft solid    Vision/Hearing  Hearing  Hearing: Exceptions to [facility]  Hearing Exceptions: Hard of hearing/hearing concerns    Oral Motor Deficits  Oral/Motor  Oral Motor:  Within functional limits    Oral Phase Dysfunction  Oral Phase  Oral Phase: Exceptions  Oral Phase Dysfunction  Impaired Mastication: Soft Solid(Mildly prolonged, pt's dentures are not present in room)  Oral Phase  Oral Phase - Comment: pt with mildly prolonged mastication noted with soft solid trials, with adequate oral clearance s/p swallow. Indicators of Pharyngeal Phase Dysfunction   Pharyngeal Phase  Pharyngeal Phase: Exceptions  Indicators of Pharyngeal Phase Dysfunction  Decreased Laryngeal Elevation: All  Cough - Immediate: Thin - cup;Nectar - straw  Pharyngeal Phase   Pharyngeal: Pt with decreased laryngeal elevation based upon palpation of anterior neck during the swallow. Pt was observed to have immediate cough following trials of thin by cup x2 and nectar by straw x1. Pt exhibited no overt s/s of aspiration with nectar thick liquids by cup sip x6, and no s/s with puree and soft solid trials. Prognosis  Prognosis  Prognosis for safe diet advancement: good  Barriers to reach goals: age;fatigue  Individuals consulted  Consulted and agree with results and recommendations: Patient;RN    Education  Patient Education: Educated pt regarding aspiration precautions, safe swallow strategies, role of SLP for dysphagia. Left handout at bedside detailing recommendations. Patient Education Response: Verbalizes understanding  Safety Devices in place: Yes  Type of devices: All fall risk precautions in place; Left in bed;Nurse notified;Call light within reach         Therapy Time  SLP Individual Minutes  Time In: 1130  Time Out: 3010  Minutes: 28  Dysphagia Eval / Dysphagia Tx    Simin Camara M.A., [place], [place]. [address]  Speech Language Pathologist    Simin Camara, SLP  5/18/2019 12:31 PM

## 2019-05-18 NOTE — PROGRESS NOTES
Speech therapy at the bedside to do speech eval.  No distress noted. Call light within reach. Bed alarm on. Will continue to monitor.

## 2019-05-18 NOTE — H&P
Hospital Medicine History & Physical      PCP: Jacob Sen MD    Date of Admission: 5/17/2019    Date of Service: Pt seen/examined on 5/18/2019 and Admitted to Inpatient     Chief Complaint:  Sent from [facility] with hypoglycemia. History Of Present Illness: The patient is a 80 y.o. male long term F resident, w hx of diet controlled DMII, mod pulm HTN, chronic leg edema, CAD, Afib chronic persistent, not on AC due to Hx of recurrent falls, who was sent from [facility] to the ED with complaints of low BG and AMS. Pt not eating well yesterday. Has been refusing to interact with staff or let them examine him. Confused and disoriented. His BG was 51 reported at [facility]. So they called his caregivers and advised then that he will be going to the ED. In the last few days he was treated for R axillary swelling and pain. Pt also reports \"phlegm\" at the roof of his mouth. He denies any chest pain, no cough, no SOB. Past Medical History:        Diagnosis Date    A-fib [facility])     Atrial fibrillation ([address]     CAD (coronary artery disease)     Cataract     CHF (congestive heart failure) (HCC)     Chronic kidney disease     stage 3    CKD (chronic kidney disease)     Diabetes mellitus (HCC)     Femur fracture (HCC)     GERD (gastroesophageal reflux disease)     Hyperlipidemia     Hypertension     MI (myocardial infarction) ([address]        Past Surgical History:        Procedure Laterality Date    ANKLE SURGERY      APPENDECTOMY      CORONARY ARTERY BYPASS GRAFT      quadruple    CYSTOSCOPY N/A 02/08/2017    URETHRAL DILATATION    HIP SURGERY Left 07/10/2017    LEFT HIP HEMIARTHROPLASTY       UPPER GASTROINTESTINAL ENDOSCOPY  4/18/16       Medications Prior to Admission:    Prior to Admission medications    Medication Sig Start Date End Date Taking? Authorizing Provider   doxycycline hyclate (VIBRAMYCIN) 100 MG capsule Take 100 mg by mouth 2 times daily 5/15/19 5/23/19 Yes Historical Provider, MD   insulin lispro (HUMALOG) 100 UNIT/ML injection vial Inject into the skin 3 times daily (before meals) Sliding scale   Yes Historical Provider, MD   pantoprazole (PROTONIX) 40 MG tablet Take 40 mg by mouth daily   Yes Historical Provider, MD   rosuvastatin (CRESTOR) 5 MG tablet Take 5 mg by mouth nightly   Yes Historical Provider, MD   traMADol (ULTRAM) 50 MG tablet Take 50 mg by mouth every 6 hours as needed for Pain. Yes Historical Provider, MD   hydrOXYzine (ATARAX) 10 MG tablet Take 25 mg by mouth three times daily    Yes Historical Provider, MD   gabapentin (NEURONTIN) 100 MG capsule Take 300 mg by mouth daily. Yes Historical Provider, MD   betamethasone dipropionate (DIPROLENE) 0.05 % cream Apply topically 2 times daily Apply topically 2 times daily. Yes Historical Provider, MD   magnesium hydroxide (MILK OF MAGNESIA) 400 MG/5ML suspension Take 30 mLs by mouth daily as needed for Constipation   Yes Historical Provider, MD   insulin aspart protamine-insulin aspart (NOVOLOG MIX 70/30) (70-30) 100 UNIT/ML injection Inject 10 Units into the skin 2 times daily (with meals) 65 units in am, 45 units in pm  Patient taking differently: Inject 65 Units into the skin every evening 65 units in am, 45 units in pm 7/13/17  Yes Anna Osborne MD   nitroGLYCERIN (NITROSTAT) 0.4 MG SL tablet Place 0.4 mg under the tongue every 5 minutes as needed for Chest pain Up to 3 tablets. If chest pain continues call 911.    Yes Historical Provider, MD   tamsulosin (FLOMAX) 0.4 MG capsule Take 0.4 mg by mouth daily    Yes Historical Provider, MD   acetaminophen (TYLENOL) 325 MG tablet Take 650 mg by mouth every 6 hours as needed for Pain    Historical Provider, MD   omeprazole (PRILOSEC) 20 MG delayed release capsule Take 40 mg by mouth daily    Historical Provider, MD   pravastatin (PRAVACHOL) 80 MG tablet Take 80 mg by mouth daily. Historical Provider, MD       Allergies:  Oxycodone; Penicillins; Percocet [oxycodone-acetaminophen]; Warfarin; and Neomycin-bacitracin zn-polymyx    Social History:  The patient currently lives at [facility] long term. TOBACCO:   reports that he has quit smoking. He has a 7.50 pack-year smoking history. His smokeless tobacco use includes chew. ETOH:   reports that he does not drink alcohol. Family History:  Reviewed in detail and negative for DM, Early CAD, Cancer, CVA. Positive as follows:        Problem Relation Age of Onset    Diabetes Mother     No Known Problems Father        REVIEW OF SYSTEMS:   As noted in the HPI. All other systems reviewed and negative. PHYSICAL EXAM:    BP (!) 155/65   Pulse 78   Temp 98.7 °F (37.1 °C) (Oral)   Resp 16   Ht 6' 1\" (1.854 m)   Wt 239 lb 6.4 oz (108.6 kg)   SpO2 95%   BMI 31.59 kg/m²     General appearance: No apparent distress appears stated age and cooperative. HEENT yellowish shallow ulcers roof of mouth. Neck: Supple, No jugular venous distention/bruits. Trachea midline without thyromegaly or adenopathy with full range of motion. Lungs: Clear to auscultation, bilaterally without Rales/Wheezes/Rhonchi with good respiratory effort. Heart: irregularly irregular. No murmurs. Abdomen: Soft, non-tender or non-distended without rigidity or guarding and positive bowel sounds all four quadrants. Extremities: 2+ pitting edema with chronic venous stasis dermatitis. Neurologic: Alert and oriented X 3, neurovascularly intact with sensory/motor intact upper extremities/lower extremities, bilaterally. Cranial nerves: II-XII intact, grossly non-focal.  Mental status: Alert, oriented, thought content appropriate.   Capillary Refill: Acceptable  < 3 seconds  Peripheral Pulses: +3 Easily felt, not easily obliterated with pressure      CBC   Recent Labs     05/17/19  1508 05/18/19  0546   WBC 17.0* 9.6   HGB 13.1* 12.7*   HCT 39.8* 38.8*   * 102*      RENAL  Recent Labs     05/17/19  1508 05/18/19  0546    135*   K 3.9 4.1    102   CO2 25 21   BUN 26* 25*   CREATININE 2.1* 1.9*     LFT'S  Recent Labs     05/17/19  1508 05/18/19  0546   AST 17 18   ALT 11 12   BILITOT 0.6 0.9   ALKPHOS 112 109     COAG  Recent Labs     05/17/19  1508   INR 1.25*     CARDIAC ENZYMES  Recent Labs     05/17/19  1508 05/17/19  2100 05/18/19  0244   TROPONINI 0.03* 0.04* 0.04*       U/A:    Lab Results   Component Value Date    COLORU Yellow 05/17/2019    WBCUA 0-2 05/17/2019    RBCUA 0-2 05/17/2019    MUCUS 1+ 05/17/2019    BACTERIA 1+ 07/10/2017    CLARITYU Clear 05/17/2019    SPECGRAV 1.010 05/17/2019    LEUKOCYTESUR Negative 05/17/2019    BLOODU Negative 05/17/2019    GLUCOSEU Negative 05/17/2019    GLUCOSEU 250 mg/dL 05/22/2010    AMORPHOUS 1+ 07/10/2017       ABG    Lab Results   Component Value Date    ZGJ7YCV 20.8 09/26/2017    BEART 0.9 09/26/2017    S5FFKBGN 97.8 09/26/2017    PHART 7.594 09/26/2017    MPG4YXV 22.0 09/26/2017    PO2ART 84.8 09/26/2017    NUY3ZOG 21.5 09/26/2017           Active Hospital Problems    Diagnosis Date Noted    HCAP (healthcare-associated pneumonia) [J18.9] 05/17/2019         PHYSICIANS CERTIFICATION:    I certify that Jane Cancino is expected to be hospitalized for more than 2 midnights based on the following assessment and plan:      ASSESSMENT/PLAN:    R axillary Abscess  Iv Vanc and cefepime. Surgery eval for I&D. Possible RML and RUL PNA in an ECF patient - organism unspecified. On Vanc and cefepime. Sepsis POA due to above   LA normal  Cultures to follow. Afib Chronic Persistent. Has been off eliquis for the past several years - stopped due to recurrent falls. On ASA. CAD - cont PTA regimen. ASA  Resume pravachol. Reassess for BB and ACEI once sepsis controlled. DMII - diet controlled   Hypoglycemia 51 at [facility] - now resolved.    Stop D5

## 2019-05-18 NOTE — ED NOTES
Patient transferred to [facility].2, bedside report given to [facility]. All belongings sent with patient.       Hugh Segura RN  05/17/19 2024

## 2019-05-19 NOTE — FLOWSHEET NOTE
05/18/19 1940   Vital Signs   Temp 100.1 °F (37.8 °C)   Temp Source Oral   Pulse 88   Heart Rate Source Monitor   Resp 16   /62   BP Location Left upper arm   BP Upper/Lower Upper   Patient Position Semi fowlers   Level of Consciousness 0   MEWS Score 1   Oxygen Therapy   SpO2 94 %   O2 Device None (Room air)   tylenol 2 po given to pt for fever. Pt confused to time/place. Oriented to self.  Cornel Mcneill

## 2019-05-19 NOTE — PROGRESS NOTES
Inpatient Occupational Therapy  Evaluation and Treatment    [facility]  Date:  5/19/2019  Patient Name:    Janett Baugh  Admitting diagnosis:  HCAP (healthcare-associated pneumonia) [J18.9]  Admit Date:  5/17/2019  Precautions/Restrictions/WB Status/ Lines/ Wounds/ Oxygen: fall risk, IV, bed/chair alarm, confusion, telemetry and telesitter     Per OT evaluation on 3/18/19, pt was NWB LUE with L wrist splint. Per Dr. Montana Bell, 5/6/19: Betty Otero discussed diagnosis and I would recommend further conservative measures. I anticipate that his wrist pain should continue to improve and likely go away altogether, this may take another 3-6 months. I would like to order hand therapy at his care facility for range of motion of the wrist and hand. The hand stiffness is of most concern at this point. We did provide a removable brace which can be worn when the wrist is bothersome. This does not need to be worn at this stage as the fracture appears healed. \"     PMHx: a fib, CAD, CKD, DM, femur fx, GERD, hyperlipidemia, HTN, MI. Treatment Time:  8585-5564  Treatment Number: 1   Billable Treatment Time: 25 minutes   Total Treatment Time:   35   minutes    Patient Goals for Therapy:  Unable to state       Discharge Recommendations: SNF  DME needs for discharge: defer to facility       Therapy recommendations for staff:   Maxi lift to chair, per discretion of RN 2/2 [facility] S4 Level Recommendation:  NA  AM-PAC Score: 12    Preadmission Environment    Pt is LTC at the [facility]. Pt had recent rehab stay at the [facility]. Prior to the [facility] (some time in February or March 2019), pt was living independently at the w/c level in a one story home. Preadmission Status / PLOF:  History of falls   Yes  Pt. Able to drive   No  Pt Fully independent with ADL's  No  (assistance from aides)   Pt was independent with w/c transfers at baseline.      Pain  Yes  Rating:moderate to severe  Location:L hip during supine to sit  Pain Medicine Status: RN notified      Cognition    A&O Person . Disoriented to place despite reminders. Able to follow 1 step commands    Subjective  Patient lying supine in bed with no family present  Pt agreeable to this OT eval & tx. Pt found to have multiple scratches all over body (primarily legs and buttocks). Upon sitting at EOB pt stated \"Scratch my back\". Pt with blood on sheets. RN notified and she placed a protective patch on the buttocks. Upper Extremity ROM:    WFL,  pt able to perform all bed mobility, transfers, and gait without ROM limitation. L wrist not assessed at this time. Pt not allowing splint removal at this time. Upper Extremity Strength:    BUE strength WFL, but not formally assessed w/ MMT    Upper Extremity Sensation    WFL    Upper Extremity Proprioception:   WFL    Coordination and Tone  WFL    Balance  Functional Sitting Balance:  WFL  Functional Standing Balance:NT    Bed mobility:    Supine to sit:   Min A  Sit to supine:   Min A  Scooting to head of bed:   Min A  Scooting in sitting:  CGA  Rolling:   Min A  Bridging:   Not Tested    Transfers:    Sit to stand: Max A of 2, in STEDY, unable to elevate bottom from EOB despite bed elevated (attempted x4 at increasing higher bed heights)   Stand to sit: Max A of 2  Bed to [facility]:  Not Tested  Bed to chair:   Not Tested  Standard toilet: Not Tested    Dressing:    UE: Min A to doff gown; ModA to don   LE:    Not Tested    Bathing:    UE:  Not Tested  LE:  Not Tested    Eating:   Not Tested    Toileting: Max A for marcus care     Positioning Needs: In bed, call light and needs in reach. Exercise / Activities Initiated:   N/A    Patient/Family Education:   Role of OT    Assessment of Deficits: Pt seen for Occupational therapy evaluation in acute care setting. Pt demonstrated decreased Activity Tolerance, ADL's, Balance, Bed Mobility, Safety Awareness and Transfers.  Pt functioning below baseline and will likely benefit from skilled occupational therapy services to maximize safety and independence. Goal(s) : To be met in 3 Visits:  1). Bed to toilet/BSC: Mod A via stand pivot     To be met in 5 Visits:  1). Supine to Sit: Supervision  2). Upper Body Bathing:  Min A  3). Lower Body Bathing: Mod A  4). Upper Body Dressing: Min A  5). Lower Body Dressing: Mod A  6). Pt to ese UE exs x 15 reps    Rehabilitation Potential:  Good for goals listed above. Strengths for achieving goals include: PLOF  Barriers to achieving goals include:  Confusion     Plan: To be seen 5 x/wk while in acute care setting for therapeutic exercises, bed mobility, transfers, dressing, bathing, family/patient education with adaptive equipment, breathing technique instruction.      NINI Coffey, OTR/L   [id number]           If patient discharges from this facility prior to next visit, this note will serve as the Discharge Summary

## 2019-05-19 NOTE — PROGRESS NOTES
Shift assessment completed:    Alert and Oriented x1. Patient much more confused today, MD notified. Vitals are WNL. Respiratory status is regular, easy, unlabored, and SpO2 is 92% on RA. Pain is 0/10. Pt denies any other complications at this time. SR up 2/4. Bed in lowest position. Call light within reach. Telesitter in place. Bed alarm is on. Will monitor.

## 2019-05-19 NOTE — PROGRESS NOTES
Disorientation, leg pain/edema. Plan    To be seen 5x/week while in acute care setting for therapeutic exercises, bed mobility, transfers, pre-gait training, balance training, and family/patient education. Timed Code Treatment Minutes: 15 minutes  Total Treatment Time:  30 minutes    Thomas Rendon, PT  #[id number]      If patient discharges from this facility prior to next visit, this note will serve as the Discharge Summary.

## 2019-05-19 NOTE — PROGRESS NOTES
Patient sitting up in bed. PCA is feeding patient. Patient still confused alert oriented to self only.

## 2019-05-19 NOTE — PROGRESS NOTES
[facility] SURGERY    PATIENT NAME: Avi Freeman     TODAY'S DATE: 5/19/2019    CHIEF COMPLAINT: None    INTERVAL HISTORY/HPI:    Pt is confused this morning. Doesn't think he's at the hospital.     REVIEW OF SYSTEMS:  Pertinent positives and negatives as per interval history section    OBJECTIVE:  VITALS:  BP (!) 162/74 Comment: high b/p  Pulse 89   Temp 99.1 °F (37.3 °C) (Oral)   Resp 18   Ht 6' 1\" (1.854 m)   Wt 235 lb 9 oz (106.9 kg)   SpO2 96%   BMI 31.08 kg/m²     INTAKE/OUTPUT:    I/O last 3 completed shifts: In: 5 [P.O.:540]  Out: 2425 [Urine:2425]  No intake/output data recorded. CONSTITUTIONAL:  awake and alert but confused  LUNGS:  Respirations easy and unlabored with cough  CARD:  irregularly irregular rhythm  ABDOMEN:  normal bowel sounds, soft, non-distended, non-tender  SKIN: right axilla with some erythema surrounding a small area of skin thickening with spontaneous drainage of prurulent material overlying a large soft mass     Data:  CBC:   Recent Labs     05/17/19  1508 05/18/19  0546 05/19/19  0618   WBC 17.0* 9.6 7.6   HGB 13.1* 12.7* 12.3*   HCT 39.8* 38.8* 36.1*   * 102* 104*     BMP:    Recent Labs     05/17/19  1508 05/18/19  0546 05/19/19  0618    135* 133*   K 3.9 4.1 4.1    102 102   CO2 25 21 20*   BUN 26* 25* 23*   CREATININE 2.1* 1.9* 2.0*   GLUCOSE 71 196* 320*     Hepatic:   Recent Labs     05/17/19  1508 05/18/19  0546   AST 17 18   ALT 11 12   BILITOT 0.6 0.9   ALKPHOS 112 109     Mag:    No results for input(s): MG in the last 72 hours. Phos:     Recent Labs     05/19/19  0618   PHOS 2.7      INR:   Recent Labs     05/17/19  1508   INR 1.25*       Radiology Review:  *Imaging personally reviewed by me. No axillary lesion is seen by CT. Small pleural effusions. My review and discussion with radiology notes an area of skin thickening with edema consistent with cellulitis. No mass seen      ASSESSMENT AND PLAN:  Axillary cellulitis.  I suspect the axillary findings are the result of 2 separate processes. As there is no mass seen on CT, he likely has a large lipoma blending in with the surrounding subcutaneous fat. Skin infection is likely a small area of hidradenitis.  Continue antibiotics and local care     Electronically signed by Miguel A Walker MD     [id number]

## 2019-05-19 NOTE — PROGRESS NOTES
Occupational and Physical Therapy  Attempted to see patient, but nursing working with patient currently (medications, starting an IV). Nursing reported increased confusion overnight into today, but negative head CT. Patient has history of falls with L hip fracture/replacement 2017 and L wrist fracture last year (velcro wrist splint in place) per nursing. Per nursing, patient reportedly normally walks with a walker at [facility]. Attempt later today or tomorrow for therapy evaluations.       Harsha Pratt, [address] [place] [address] Radha, OTR/L  #[id number]

## 2019-05-19 NOTE — PROGRESS NOTES
Patient is still confused. Patient pulled call light out of the wall and when I tried to get the cord to plug it back in he said I could have it back if I take this off (he pointed to posey belt). No distress noted.

## 2019-05-19 NOTE — PROGRESS NOTES
Report given to [facility].   Percy Long

## 2019-05-19 NOTE — FLOWSHEET NOTE
05/18/19 2327   Vital Signs   Temp 98.9 °F (37.2 °C)   Temp Source Oral   Pulse 81   Heart Rate Source Monitor   Resp 18   BP (!) 144/71  (high b/p)   BP Location Left upper arm   BP Upper/Lower Upper   Patient Position Semi fowlers   Level of Consciousness 0   MEWS Score 1   Oxygen Therapy   SpO2 95 %   O2 Device None (Room air)   Pt very confused tonight, thinks he needs to get OOB to go home. Moved pt closer to Nurse's station. Telesitter in place.  Bed alarm on. Mirian Hall

## 2019-05-19 NOTE — FLOWSHEET NOTE
05/19/19 0418   Vital Signs   Temp 99.1 °F (37.3 °C)   Temp Source Oral   Pulse 89   Heart Rate Source Monitor   Resp 18   BP (!) 162/74  (high b/p)   BP Location Left upper arm   BP Upper/Lower Upper   Patient Position Semi fowlers   Level of Consciousness 0   MEWS Score 1   Height and Weight   Weight 235 lb 9 oz (106.9 kg)   Weight Method Bed scale   BMI (Calculated) 31.1   Oxygen Therapy   SpO2 93 %   O2 Device None (Room air)   Pt resting in bed, no acute distress.  Corea Scripture

## 2019-05-19 NOTE — PROGRESS NOTES
Hospitalist Progress Note      PCP: An Galeana MD    Date of Admission: 5/17/2019    Chief Complaint: AMS, cough, R axillary pain from ECF        Subjective:     Pt more confused this am. Disoriented. No focal neurological deficit. Running low grade fever. Medications:  Reviewed    Infusion Medications    dextrose       Scheduled Medications    insulin glargine  20 Units Subcutaneous BID    insulin lispro  0-12 Units Subcutaneous TID WC    insulin lispro  0-6 Units Subcutaneous Nightly    ipratropium-albuterol  1 ampule Inhalation Q4H WA    aspirin  81 mg Oral Daily    chlorhexidine  15 mL Mouth/Throat BID    pravastatin  20 mg Oral Nightly    tamsulosin  0.4 mg Oral Daily    enoxaparin  30 mg Subcutaneous Daily    sodium chloride flush  10 mL Intravenous 2 times per day    cefepime  1 g Intravenous Q12H    vancomycin (VANCOCIN) intermittent dosing (placeholder)   Other RX Placeholder     PRN Meds: ipratropium-albuterol, sodium chloride flush, magnesium hydroxide, ondansetron, glucose, dextrose, glucagon (rDNA), dextrose, acetaminophen      Intake/Output Summary (Last 24 hours) at 5/19/2019 1702  Last data filed at 5/19/2019 1528  Gross per 24 hour   Intake 860 ml   Output 2025 ml   Net -1165 ml       Physical Exam Performed:    /60   Pulse 92   Temp 99.6 °F (37.6 °C) (Axillary)   Resp 18   Ht 6' 1\" (1.854 m)   Wt 235 lb 9 oz (106.9 kg)   SpO2 96%   BMI 31.08 kg/m²     General appearance: No apparent distress appears stated age and cooperative. HEENT yellowish shallow ulcers roof of mouth. Neck: Supple, No jugular venous distention/bruits. Trachea midline without thyromegaly or adenopathy with full range of motion. Lungs: Clear to auscultation, bilaterally without Rales/Wheezes/Rhonchi with good respiratory effort. Heart: irregularly irregular. No murmurs.    Abdomen: Soft, non-tender or non-distended without rigidity or guarding and positive bowel sounds all four

## 2019-05-20 NOTE — PROGRESS NOTES
Pt is awake but calmer. Able to obtain VS & tele monitor placed back on pt at this time.  Mak Gillette

## 2019-05-20 NOTE — FLOWSHEET NOTE
05/20/19 0815   Vital Signs   Temp 98.1 °F (36.7 °C)   Temp Source Axillary   Pulse 99   Heart Rate Source Monitor   Resp 18   BP (!) 153/88   BP Location Right upper arm   BP Upper/Lower Upper   Patient Position Semi fowlers   Level of Consciousness 0   MEWS Score 1   Oxygen Therapy   SpO2 96 %   O2 Device None (Room air)   Pt laying in bed, noted confusion, per report has been since admit and confusion is baseline but worse now. Pt noncompliant and does not obey commands. Jackson belt in place. AM assessment complete. Call light in reach. Pt noted to scrape right toe on end of bed, with laceration noted, cleaned and applied dressing, MD aware. Abscess to right axilla area with dressing intact, and redness around are noted.

## 2019-05-20 NOTE — PLAN OF CARE
Problem: Falls - Risk of:  Goal: Will remain free from falls  Description  Will remain free from falls  Outcome: Ongoing  Goal: Absence of physical injury  Description  Absence of physical injury  Outcome: Ongoing     Problem: Risk for Impaired Skin Integrity  Goal: Tissue integrity - skin and mucous membranes  Description  Structural intactness and normal physiological function of skin and  mucous membranes.   Outcome: Ongoing     Problem: Discharge Planning:  Goal: Discharged to appropriate level of care  Description  Discharged to appropriate level of care  Outcome: Ongoing  Goal: Participates in care planning  Description  Participates in care planning  Outcome: Ongoing     Problem: Airway Clearance - Ineffective:  Goal: Clear lung sounds  Description  Clear lung sounds  Outcome: Ongoing  Goal: Ability to maintain a clear airway will improve  Description  Ability to maintain a clear airway will improve  Outcome: Ongoing     Problem: Fluid Volume - Deficit:  Goal: Achieves intake and output within specified parameters  Description  Achieves intake and output within specified parameters  Outcome: Ongoing     Problem: Gas Exchange - Impaired:  Goal: Levels of oxygenation will improve  Description  Levels of oxygenation will improve  Outcome: Ongoing     Problem: Hyperthermia:  Goal: Ability to maintain a body temperature in the normal range will improve  Description  Ability to maintain a body temperature in the normal range will improve  Outcome: Ongoing     Problem: OXYGENATION/RESPIRATORY FUNCTION  Goal: Patient will maintain patent airway  Outcome: Ongoing  Goal: Patient will achieve/maintain normal respiratory rate/effort  Description  Respiratory rate and effort will be within normal limits for the patient  Outcome: Ongoing     Problem: HEMODYNAMIC STATUS  Goal: Patient has stable vital signs and fluid balance  Outcome: Ongoing     Problem: FLUID AND ELECTROLYTE IMBALANCE  Goal: Fluid and electrolyte balance are achieved/maintained  Outcome: Ongoing     Problem: ACTIVITY INTOLERANCE/IMPAIRED MOBILITY  Goal: Mobility/activity is maintained at optimum level for patient  Outcome: Ongoing     Problem: Restraint Use - Nonviolent/Non-Self-Destructive Behavior:  Goal: Absence of restraint indications  Description  Absence of restraint indications  Outcome: Ongoing  Goal: Absence of restraint-related injury  Description  Absence of restraint-related injury  Outcome: Ongoing

## 2019-05-20 NOTE — PROGRESS NOTES
General Surgery - [facility], [place]  Daily Progress Note    Pt Name: Dixon Delaney  Medical Record Number: [id number]  Date of Birth 6/14/1931   Today's Date: 5/20/2019    ASSESSMENT  1. CT of the chest 5/18: no axillary lesion on CT  2. Right axillary region: small open wound which did not appear to have tunneling with moderate amount of soila purulent drainage noted. I tried to squeeze the firm area under the pateint's arm there was not additional purulence, we are unsure of when the gauze over the open wound was changed. Mild erythema noted, no fluctuance noted. It is of note the pateint was trying to hit and kick during assessment: a RN and PCA had to hold him down to examine the area. 3. Pt appears confused and combative at times. 4. leuks 17->7.6 yesterday  5.  (Hgb a1c 6.5)  6. Blood cultures negative    PLAN  1. May change gauze covering right axillary wound: BID and as needed, there is no packing to be changed   2. IV antibiotics: cefepime and vanc   3. Will send wound culture   4. General diet   5. Labs in the am  6. No surgical plans at this time: will follow to see if abscess develops     Joy Ching is unchanged from yesterday. Pain is not well controlled. He has no nausea and no vomiting. He has passed flatus and has not had a bowel movement. He is tolerating regular diet. Current activity is up with assistance    OBJECTIVE  VITALS:  height is 6' 1\" (1.854 m) and weight is 219 lb 9.6 oz (99.6 kg). His axillary temperature is 99.5 °F (37.5 °C). His blood pressure is 170/83 (abnormal) and his pulse is 98. His respiration is 16 and oxygen saturation is 95%.    VITALS:  BP (!) 170/83   Pulse 98   Temp 99.5 °F (37.5 °C) (Axillary)   Resp 16   Ht 6' 1\" (1.854 m)   Wt 219 lb 9.6 oz (99.6 kg)   SpO2 95%   BMI 28.97 kg/m²   INTAKE/OUTPUT:    Intake/Output Summary (Last 24 hours) at 5/20/2019 1455  Last data filed at 5/20/2019 0815  Gross per 24 hour   Intake 340 ml   Output 475 ml   Net -135 ml     GENERAL: no distress, uncooperative    I/O last 3 completed shifts: In: 540 [P.O.:440; IV Piggyback:100]  Out: 575 [Urine:575]  I/O this shift:  In: 120 [P.O.:120]  Out: -     LABS  Recent Labs     05/17/19  1508 05/17/19  1605 05/18/19  0546 05/19/19  0618   WBC 17.0*  --  9.6 7.6   HGB 13.1*  --  12.7* 12.3*   HCT 39.8*  --  38.8* 36.1*   *  --  102* 104*     --  135* 133*   K 3.9  --  4.1 4.1     --  102 102   CO2 25  --  21 20*   BUN 26*  --  25* 23*   CREATININE 2.1*  --  1.9* 2.0*   PHOS  --   --   --  2.7   CALCIUM 9.5  --  9.1 9.1   INR 1.25*  --   --   --    AST 17  --  18  --    ALT 11  --  12  --    BILITOT 0.6  --  0.9  --    NITRU  --  Negative  --   --    COLORU  --  Yellow  --   --      CBC with Differential:    Lab Results   Component Value Date    WBC 7.6 05/19/2019    RBC 3.94 05/19/2019    HGB 12.3 05/19/2019    HCT 36.1 05/19/2019     05/19/2019    MCV 91.7 05/19/2019    MCH 31.2 05/19/2019    MCHC 34.0 05/19/2019    RDW 14.9 05/19/2019    SEGSPCT 68.8 05/22/2010    LYMPHOPCT 20.1 05/19/2019    MONOPCT 12.0 05/19/2019    EOSPCT 0.2 05/22/2010    BASOPCT 0.3 05/19/2019    MONOSABS 0.9 05/19/2019    LYMPHSABS 1.5 05/19/2019    EOSABS 0.2 05/19/2019    BASOSABS 0.0 05/19/2019    DIFFTYPE Auto 05/22/2010     CMP:    Lab Results   Component Value Date     05/19/2019    K 4.1 05/19/2019    K 4.1 05/18/2019     05/19/2019    CO2 20 05/19/2019    BUN 23 05/19/2019    CREATININE 2.0 05/19/2019    GFRAA 38 05/19/2019    GFRAA >60 05/22/2010    AGRATIO 1.2 05/18/2019    LABGLOM 32 05/19/2019    GLUCOSE 320 05/19/2019    PROT 6.9 05/18/2019    PROT 7.2 05/22/2010    LABALBU 3.7 05/19/2019    CALCIUM 9.1 05/19/2019    BILITOT 0.9 05/18/2019    ALKPHOS 109 05/18/2019    AST 18 05/18/2019    ALT 12 05/18/2019         Dipti Ahumada Parterell  Electronically signed 5/20/2019 at 2:46 PM     Patient seen and examined.   I agree with the assessment and plan from [employer], CNP. R axilla with some redness and induration. There is drainage on the dressing. CT reviewed. Continue antibiotics. No indication at present for I&D.     [address] [facility]

## 2019-05-20 NOTE — PROGRESS NOTES
Tele sitter called at this time that pt wiggled self to end of bed & kicked over a chair. Went to assess pt & moved all furniture out of reach. 4 staff members to reposition pt & peri in place. Susan , charge RN notified Dr Lacie Robles & new order for ativan.   King Vazquez

## 2019-05-20 NOTE — PROGRESS NOTES
Tele sitter calling at this time & pt to foot of the bed. Multiple staff members present to assist pt back to bed. Rogers replaced & now in proper position. Bed alarm. Tele sitter.  Olga Busch

## 2019-05-20 NOTE — PROGRESS NOTES
VS obtained at this time. BP elevated at 212/66 however 4 taff members holding pt to obtain these vitals. Pt is swinging at staff & kicking them. Remains in [address] [place] [address]

## 2019-05-20 NOTE — PROGRESS NOTES
Pt incont large amount urine. Pt was not combative at this time but pt did try to fight against staff with linen & attends changed. Pt remains in [address] [place] [address]

## 2019-05-20 NOTE — PROGRESS NOTES
Physical Therapy/Occupational Therapy  Attempting PT/OT follow up visits this afternoon, but pt sleeping soundly. Will attempt again tomorrow as appropriate.      Rafy José, PT, DPT #[id number]  [place]am [place], OTR/L [id number]

## 2019-05-20 NOTE — PROGRESS NOTES
Hospitalist Progress Note      PCP: Estela Schneider MD    Date of Admission: 5/17/2019    Chief Complaint: AMS, cough, R axillary pain from ECF    Subjective:     Pt more confused this am. Disoriented. No focal neurological deficit. Running low grade fever. 5/20   Has posey  Confused       Medications:  Reviewed    Infusion Medications    dextrose       Scheduled Medications    vancomycin  1,000 mg Intravenous Once    insulin glargine  20 Units Subcutaneous BID    insulin lispro  0-12 Units Subcutaneous TID WC    insulin lispro  0-6 Units Subcutaneous Nightly    ipratropium-albuterol  1 ampule Inhalation Q4H WA    aspirin  81 mg Oral Daily    chlorhexidine  15 mL Mouth/Throat BID    pravastatin  20 mg Oral Nightly    tamsulosin  0.4 mg Oral Daily    enoxaparin  30 mg Subcutaneous Daily    sodium chloride flush  10 mL Intravenous 2 times per day    cefepime  1 g Intravenous Q12H    vancomycin (VANCOCIN) intermittent dosing (placeholder)   Other RX Placeholder     PRN Meds: ipratropium-albuterol, sodium chloride flush, magnesium hydroxide, ondansetron, glucose, dextrose, glucagon (rDNA), dextrose, acetaminophen      Intake/Output Summary (Last 24 hours) at 5/20/2019 0840  Last data filed at 5/20/2019 0422  Gross per 24 hour   Intake 420 ml   Output 575 ml   Net -155 ml       Physical Exam Performed:    BP (!) 153/88   Pulse 99   Temp 98.1 °F (36.7 °C) (Axillary)   Resp 18   Ht 6' 1\" (1.854 m)   Wt 219 lb 9.6 oz (99.6 kg)   SpO2 96%   BMI 28.97 kg/m²     General appearance: No apparent distress appears stated age and cooperative. HEENT yellowish shallow ulcers roof of mouth. Neck: Supple, No jugular venous distention/bruits. Trachea midline without thyromegaly or adenopathy with full range of motion. Lungs: Clear to auscultation, bilaterally without Rales/Wheezes/Rhonchi with good respiratory effort. Heart: irregularly irregular. No murmurs.    Abdomen: Soft, non-tender or non-distended without rigidity or guarding and positive bowel sounds all four quadrants. Extremities: 1+ pitting edema with chronic venous stasis dermatitis. Neurologic:disoriented        Labs:   Recent Labs     05/17/19  1508 05/18/19  0546 05/19/19  0618   WBC 17.0* 9.6 7.6   HGB 13.1* 12.7* 12.3*   HCT 39.8* 38.8* 36.1*   * 102* 104*     Recent Labs     05/17/19  1508 05/18/19  0546 05/19/19  0618    135* 133*   K 3.9 4.1 4.1    102 102   CO2 25 21 20*   BUN 26* 25* 23*   CREATININE 2.1* 1.9* 2.0*   CALCIUM 9.5 9.1 9.1   PHOS  --   --  2.7     Recent Labs     05/17/19  1508 05/18/19  0546   AST 17 18   ALT 11 12   BILITOT 0.6 0.9   ALKPHOS 112 109     Recent Labs     05/17/19  1508   INR 1.25*     Recent Labs     05/17/19  1508 05/17/19  2100 05/18/19  0244   TROPONINI 0.03* 0.04* 0.04*       Urinalysis:      Lab Results   Component Value Date    NITRU Negative 05/17/2019    WBCUA 0-2 05/17/2019    BACTERIA 1+ 07/10/2017    RBCUA 0-2 05/17/2019    BLOODU Negative 05/17/2019    SPECGRAV 1.010 05/17/2019    GLUCOSEU Negative 05/17/2019    GLUCOSEU 250 mg/dL 05/22/2010       Radiology:  CT HEAD WO CONTRAST   Final Result   1. No acute intracranial abnormality. CT CHEST WO CONTRAST   Final Result   No axillary lesion is seen by CT. Small pleural effusions. XR CHEST STANDARD (2 VW)   Final Result   Cardiomegaly and mild bilateral congestion with left basilar infiltrate   representing atelectasis versus pneumonia. XR CHEST PORTABLE   Final Result   Question right mid to upper lung airspace disease, atelectasis or pneumonia. Follow-up to resolution is recommended. Assessment/Plan:    Active Hospital Problems    Diagnosis    HCAP (healthcare-associated pneumonia) [J18.9]       R axillary Abscess  Iv Vanc and cefepime. Surgery eval for I&D - s/p bedside I&D - cont wound care.       Possible RML and RUL PNA in an ECF patient - organism unspecified. On Vanc and cefepime.       Sepsis POA due to above   LA normal  Cultures to follow.         Afib Chronic Persistent. Has been off eliquis for the past several years - stopped due to recurrent falls. On ASA.         CAD - cont PTA regimen. ASA  Resume pravachol. Reassess for BB and ACEI once sepsis controlled.         DMII - diet controlled   Hypoglycemia 51 at [facility] - now resolved. Stop D5 infusion. Start sliding scale insulin. Add carb control diet. Add lantus and increase ISS to med dose due to rebound hyperglycemia.       Mod Pulm HTN with chronic and extensive bilateral leg edema and venous stasis dermatitis. Consider diuretic once sepsis controlled.       CKDIV - stable - monitor Cr  Baseline Cr 1.7-1. 8          Acute Presumed metabolic Encephalopathy -   CT head negative. Requiring restraints today.         DVT Prophylaxis: lovenox 30  Diet: DIET GENERAL; Carb Control: 5 carb choices (75 gms)/meal  Code Status: Full Code        Dispo - inpatient.         RAMON Jacobo M.D.

## 2019-05-20 NOTE — PROGRESS NOTES
Pt is asleep in bed. Eyes closed. Although pt is much more relaxed pt is still pulling at blanket, pulling at tele monitor, removed left wrist brace (replaced), and still restless. Posey in place. Tele sitter in place.  [employer]

## 2019-05-21 NOTE — PROGRESS NOTES
light and needs in reach. Alarm Set    Family Present:  No    Assessment: Treatment limited by patient's decreased cooperation during session. Will need SNF at [place] to improve activity tolerance and independence. GOALS  To be met in 3 Visits:  1). Bed to toilet/BSC: Mod A via stand pivot      To be met in 5 Visits:  1). Supine to Sit: Supervision  2). Upper Body Bathing:  Min A  3). Lower Body Bathing: Mod A  4). Upper Body Dressing: Min A  5). Lower Body Dressing: Mod A  6).  Pt to ese UE exs x 15 reps        Plan: cont with [address] [facility] 157, OTR/L #[id number]        If patient discharges from this facility prior to next visit, this note will serve as the Discharge Summary

## 2019-05-21 NOTE — PROGRESS NOTES
sat EOB x12 minutes with SBA  Dynamic Sitting:  Good -   Static Standing: Not tested   Tolerance:   Dynamic Standing: Not tested    Patient Education      Role of PT, POC, Discharge recommendations, safety awareness. Positioning Needs       Pt returned to bed, call light and needs in reach. ROM Measurements N/A  Knee Flexion:  Knee Extension:     Activity Tolerance   Pt completed therapy session with No nausea, dizziness, pain or SOB noted w/activity. SpO2:   HR:  BP:     Other  None. Assessment :  Patient able to tolerate sitting EOB this session with max A x2 for supine to sit transfer. Pt became agitated and combative at EOB requiring assist x3 to return patient safely to supine position. Will continue to progress mobility as pt tolerates. Goals (all goals ongoing unless otherwise indicated)  To be met in 3 visits:  1). Patient able to stand with STEDY MOD A of 2.     To be met in 6 visits:  1). Supine to/from sit CGA of 1.  2). Sit to/from stand MOD A of 1.  3). Bed to chair MOD A of 1 via STEDY. 4). Patient able to  STEDY x2 minutes CGA. 5). Tolerate B LE exercises 10 reps. Plan   Continue with plan of care. [address], [place] [id number]    If patient discharges from this facility prior to next visit, this note will serve as the Discharge Summary.

## 2019-05-21 NOTE — PROGRESS NOTES
RESPIRATORY THERAPY ASSESSMENT    Name:  [id number] [facility] Record Number:  [id number]  Age: 80 y.o. Gender: male  : 1931  Today's Date:  2019  Room:  [address] [place] [address]-    Assessment     Is the patient being admitted for a COPD or Asthma exacerbation? No   (If yes the patient will be seen every 4 hours for the first 24 hours and then reassessed)    Patient Admission Diagnosis      Allergies  Allergies   Allergen Reactions    Oxycodone      Other reaction(s): delirium    Penicillins     Percocet [Oxycodone-Acetaminophen] Other (See Comments)     Pt. States \"I was delusional when I took that. \"     Warfarin Itching    Neomycin-Bacitracin Zn-Polymyx Rash       Minimum Predicted Vital Capacity:     1251          Actual Vital Capacity:      Pt unable to attempt              Pulmonary History:CHF/Pulmonary Edema  Home Oxygen Therapy:  room air  Home Respiratory Therapy:None   Current Respiratory Therapy:  Duoneb Q4 WA, Duoneb Q4 PRN  Treatment Type: HHN  Medications: Albuterol/Ipratropium    Respiratory Severity Index(RSI)   Patients with orders for inhalation medications, oxygen, or any therapeutic treatment modality will be placed on Respiratory Protocol. They will be assessed with the first treatment and at least every 72 hours thereafter. The following severity scale will be used to determine frequency of treatment intervention.     Smoking History: Smoking History Less than 1ppd or less than 15 pack year = 1    Social History  Social History     Tobacco Use    Smoking status: Former Smoker     Packs/day: 0.50     Years: 15.00     Pack years: 7.50    Smokeless tobacco: Current User     Types: Chew   Substance Use Topics    Alcohol use: No    Drug use: No       Recent Surgical History: None = 0  Past Surgical History  Past Surgical History:   Procedure Laterality Date    ANKLE SURGERY      APPENDECTOMY      CORONARY ARTERY BYPASS GRAFT      quadruple    CYSTOSCOPY N/A 2017    URETHRAL DILATATION    HIP SURGERY Left 07/10/2017    LEFT HIP HEMIARTHROPLASTY       UPPER GASTROINTESTINAL ENDOSCOPY  4/18/16       Level of Consciousness: Disoriented and Uncooperative = 2    Level of Activity: Mostly sedentary, minimal walking = 2    Respiratory Pattern: Regular Pattern; RR 8-20 = 0    Breath Sounds: Diminshed bilaterally and/or crackles = 2    Sputum   ,  , Sputum How Obtained: Spontaneous cough  Cough: Strong, spontaneous, non-productive = 0    Vital Signs   BP (!) 155/85   Pulse 85   Temp 98.9 °F (37.2 °C) (Axillary)   Resp 16   Ht 6' 1\" (1.854 m)   Wt 219 lb 9.6 oz (99.6 kg)   SpO2 94%   BMI 28.97 kg/m²   SPO2 (COPD values may differ): Greater than or equal to 92% on room air = 0    Peak Flow (asthma only): not applicable = 0    RSI: 7-8 = BID and Q4HPRN (every four hours as needed) for dyspnea        Plan       Goals: medication delivery, mobilize retained secretions, volume expansion and improve oxygenation    Patient/caregiver was educated on the proper method of use for Respiratory Care Devices:  Yes      Level of patient/caregiver understanding able to:   ? Verbalize understanding   ? Demonstrate understanding       ? Teach back        ? Needs reinforcement       ? No available caregiver               ? Other:     Response to education:  Very Good     Is patient being placed on Home Treatment Regimen? No     Does the patient have everything they need prior to discharge? NA     Comments: pt assessed & chart reviewed    Plan of Care: Duoneb TID, Duoneb Q4 PRN    Electronically signed by Millie Hylton RCP on 5/21/2019 at 12:27 AM    Respiratory Protocol Guidelines     1. Assessment and treatment by Respiratory Therapy will be initiated for medication and therapeutic interventions upon initiation of aerosolized medication. 2. Physician will be contacted for respiratory rate (RR) greater than 35 breaths per minute.  Therapy will be held for heart rate (HR) greater than 140 beats per minute, pending direction from physician. 3. Bronchodilators will be administered via Metered Dose Inhaler (MDI) with spacer when the following criteria are met:  a. Alert and cooperative     b. HR < 140 bpm  c. RR < 30 bpm                d. Can demonstrate a 2-3 second inspiratory hold  4. Bronchodilators will be administered via Hand Held Nebulizer [facility]) to patients when ANY of the following criteria are met  a. Incognizant or uncooperative          b. Patients treated with HHN at Home        c. Unable to demonstrate proper use of MDI with spacer     d. RR > 30 bpm   5. Bronchodilators will be delivered via Metered Dose Inhaler (MDI), HHN, Aerogen to intubated patients on mechanical ventilation. 6. Inhalation medication orders will be delivered and/or substituted as outlined below. Aerosolized Medications Ordering and Administration Guidelines:    1. All Medications will be ordered by a physician, and their frequency and/or modality will be adjusted as defined by the patients Respiratory Severity Index (RSI) score. 2. If the patient does not have documented COPD, consider discontinuing anticholinergics when RSI is less than 9.  3. If the bronchospasm worsens (increased RSI), then the bronchodilator frequency can be increased to a maximum of every 4 hours. If greater than every 4 hours is required, the physician will be contacted. 4. If the bronchospasm improves, the frequency of the bronchodilator can be decreased, based on the patient's RSI, but not less than home treatment regimen frequency. 5. Bronchodilator(s) will be discontinued if patient has a RSI less than 9 and has received no scheduled or as needed treatment for 72  Hrs. Patients Ordered on a Mucolytic Agent:    1. Must always be administered with a bronchodilator. 2. Discontinue if patient experiences worsened bronchospasm, or secretions have lessened to the point that the patient is able to clear them with a cough.     Anti-inflammatory

## 2019-05-21 NOTE — PROGRESS NOTES
Assisted therapy with positioning pt back in bed, pt noted to become combative. Safely assisted pt to laying position, elevated legs and head, pt calmer at this time. Telesitter in place.

## 2019-05-21 NOTE — FLOWSHEET NOTE
05/20/19 2245   Vital Signs   Temp 98.9 °F (37.2 °C)   Temp Source Axillary   Pulse 85   Heart Rate Source Monitor   Resp 16   BP (!) 155/85   BP Location Right upper arm   Patient Position Semi fowlers   Level of Consciousness 0   MEWS Score 1   Oxygen Therapy   SpO2 93 %   O2 Device None (Room air)   Assessment complete, see flowsheets. Pt resting in bed at this time, pt somewhat drowsy so he did not want to take his PO meds. Telesitter in place for pt safety. Will continue to monitor.   Keaton Galicia RN

## 2019-05-21 NOTE — PROGRESS NOTES
Hospitalist Progress Note      PCP: Sung Grande MD    Date of Admission: 5/17/2019    Chief Complaint: AMS, cough, R axillary pain from ECF    Subjective:     Pt more confused this am. Disoriented. No focal neurological deficit. Running low grade fever. 5/20   Has posey  Confused     5/21  Posey removed   More alert today      Medications:  Reviewed    Infusion Medications    dextrose       Scheduled Medications    ipratropium-albuterol  1 ampule Inhalation TID    insulin glargine  20 Units Subcutaneous BID    insulin lispro  0-12 Units Subcutaneous TID WC    insulin lispro  0-6 Units Subcutaneous Nightly    aspirin  81 mg Oral Daily    chlorhexidine  15 mL Mouth/Throat BID    pravastatin  20 mg Oral Nightly    tamsulosin  0.4 mg Oral Daily    enoxaparin  30 mg Subcutaneous Daily    sodium chloride flush  10 mL Intravenous 2 times per day    cefepime  1 g Intravenous Q12H    vancomycin (VANCOCIN) intermittent dosing (placeholder)   Other RX Placeholder     PRN Meds: haloperidol lactate, ipratropium-albuterol, sodium chloride flush, magnesium hydroxide, ondansetron, glucose, dextrose, glucagon (rDNA), dextrose, acetaminophen    No intake or output data in the 24 hours ending 05/21/19 0825    Physical Exam Performed:    BP (!) 149/69   Pulse 98   Temp 99.8 °F (37.7 °C) (Oral)   Resp 18   Ht 6' 1\" (1.854 m)   Wt 219 lb 9.6 oz (99.6 kg)   SpO2 94%   BMI 28.97 kg/m²     General appearance: No apparent distress appears stated age and cooperative. HEENT yellowish shallow ulcers roof of mouth. Neck: Supple, No jugular venous distention/bruits. Trachea midline without thyromegaly or adenopathy with full range of motion. Lungs: Clear to auscultation, bilaterally without Rales/Wheezes/Rhonchi with good respiratory effort. Heart: irregularly irregular. No murmurs.    Abdomen: Soft, non-tender or non-distended without rigidity or guarding and positive bowel sounds all four quadrants. Extremities: 1+ pitting edema with chronic venous stasis dermatitis. Neurologic:disoriented,awake         Labs:   Recent Labs     05/19/19 0618   WBC 7.6   HGB 12.3*   HCT 36.1*   *     Recent Labs     05/19/19 0618   *   K 4.1      CO2 20*   BUN 23*   CREATININE 2.0*   CALCIUM 9.1   PHOS 2.7     No results for input(s): AST, ALT, BILIDIR, BILITOT, ALKPHOS in the last 72 hours. No results for input(s): INR in the last 72 hours. No results for input(s): Delcie Reno in the last 72 hours. Urinalysis:      Lab Results   Component Value Date    NITRU Negative 05/17/2019    WBCUA 0-2 05/17/2019    BACTERIA 1+ 07/10/2017    RBCUA 0-2 05/17/2019    BLOODU Negative 05/17/2019    SPECGRAV 1.010 05/17/2019    GLUCOSEU Negative 05/17/2019    GLUCOSEU 250 mg/dL 05/22/2010       Radiology:  CT HEAD WO CONTRAST   Final Result   1. No acute intracranial abnormality. CT CHEST WO CONTRAST   Final Result   No axillary lesion is seen by CT. Small pleural effusions. XR CHEST STANDARD (2 VW)   Final Result   Cardiomegaly and mild bilateral congestion with left basilar infiltrate   representing atelectasis versus pneumonia. XR CHEST PORTABLE   Final Result   Question right mid to upper lung airspace disease, atelectasis or pneumonia. Follow-up to resolution is recommended. Assessment/Plan:    Active Hospital Problems    Diagnosis    Pneumonia due to organism [T66.6]    Metabolic encephalopathy [W25.26]    HCAP (healthcare-associated pneumonia) [J18.9]       R axillary Abscess  Iv Vanc and cefepime. Surgery eval for I&D - s/p bedside I&D - cont wound care.       Possible RML and RUL PNA in an ECF patient - organism unspecified. On Vanc and cefepime.       Sepsis POA due to above   LA normal  Cultures to follow.         Afib Chronic Persistent. Has been off eliquis for the past several years - stopped due to recurrent falls. On ASA.       CAD - cont PTA regimen. ASA  Resume pravachol. Reassess for BB and ACEI once sepsis controlled.         DMII - diet controlled   Hypoglycemia [facility] at [facility] - now resolved. Stop D5 infusion. Start sliding scale insulin. Add carb control diet. Add lantus and increase ISS to med dose due to rebound hyperglycemia.       Mod Pulm HTN with chronic and extensive bilateral leg edema and venous stasis dermatitis. Consider diuretic once sepsis controlled.       CKDIV - stable - monitor Cr  Baseline Cr 1.7-1. 8          Acute Presumed metabolic Encephalopathy -   CT head negative. Requiring restraints today. Off restraints. More alert today. Still disoriented         DVT Prophylaxis: lovenox 30  Diet: DIET GENERAL; Carb Control: 5 carb choices (75 gms)/meal  Code Status: Full Code        Dispo - inpatient.         RAMON Jacobo M.D.

## 2019-05-21 NOTE — PLAN OF CARE
SHABANA Moran  Outcome: Ongoing  Goal: Absence of restraint-related injury  Description  Absence of restraint-related injury  5/20/2019 2025 by Keaton Galicia RN  Outcome: Ongoing  5/20/2019 1124 by Oren Denis RN  Outcome: Ongoing

## 2019-05-21 NOTE — FLOWSHEET NOTE
05/21/19 0800   Vital Signs   Temp 99.8 °F (37.7 °C)   Temp Source Oral   Pulse 98   Heart Rate Source Monitor   Resp 18   BP (!) 149/69   BP Location Right upper arm   BP Upper/Lower Upper   Patient Position Semi fowlers   Level of Consciousness 0   MEWS Score 1   Oxygen Therapy   SpO2 94 %   O2 Device None (Room air)   Pt laying in bed, eyes closed. No signs of distress. AM assessment complete. Call light in reach.

## 2019-05-21 NOTE — PROGRESS NOTES
Speech Language Pathology  Facility/Department: [facility]  Dysphagia Daily Treatment Note    NAME: Yu Jacob  : 1931  MRN: [id number]    Patient Diagnosis(es):   Patient Active Problem List    Diagnosis Date Noted    Acute encephalopathy 2016     Priority: High    Pneumonia due to organism     Metabolic encephalopathy     HCAP (healthcare-associated pneumonia) 2019    Cellulitis and abscess of leg 2019    ELEANOR (acute kidney injury) ([address] 2019    Closed fracture of distal end of left radius 2019    Closed nondisplaced fracture of styloid process of left ulna 2019    Scalp laceration 2019    Multiple skin tears     Abscess     Infected decubitus ulcer, unstageable ([address] 2018    Somnolence     Acute hyperglycemia 2017    GERD (gastroesophageal reflux disease) 2017    BPH (benign prostatic hyperplasia) 2017    Type 2 diabetes mellitus with chronic kidney disease, without long-term current use of insulin ([address] 2017    Hx total left hip arthroplasty (2017) 2017    Chronic atrial fibrillation ([address] 2017    Frequent falls 2017    Chronically elevated INR 2017    Elevated brain natriuretic peptide (BNP) level 2017    Debility 2017    Former smoker 2017    Disorientation     Venous ulcers of bilateral legs 04/15/2017    Coronary artery disease involving native heart without angina pectoris 2017    Essential hypertension 2017    Chronic kidney disease 2017    Cellulitis of buttock 2017    Atrial fibrillation with slow ventricular response ([address] 2017    Delirium      Allergies: Allergies   Allergen Reactions    Oxycodone      Other reaction(s): delirium    Penicillins     Percocet [Oxycodone-Acetaminophen] Other (See Comments)     Pt. States \"I was delusional when I took that. \"     Warfarin Itching    Neomycin-Bacitracin Zn-Polymyx Rash     Onset Date:   5-18-19    Subjective:  Patient was in the bed and had a reduced level of reaction in presence of SLP>    Pain:  No report of pain    Current Diet:  Dental soft and NTL    Diet Tolerance:  Patient tolerating current diet level without staff reported signs/symptoms of penetration / aspiration. P.O. Trials: no po given  Thin       Nectar       Honey       Puree       Solid         Dysphagia Therapy: The patient was on room air and the patient had no PO given at the time of the visit, secondary to a reduced level of reaction in presence of SLP. This was the first visit with this SLP during this admission. The patient maintained an open mouth posture at rest. There was no saliva swallow on cue or spontaneously at the time of the visit. The patient was noted to have dry oral mucosa at the time of the visit. The patient was given task segmentation, reality orientation, and re-direction at the time of the visit. The patient had night shift attending nurse notes reporting that he patient was drowsy. SLP explained the purpose of the visit as well. The patient had minimal right eye opening noted only. The patient had 5-19-19 CT head per MD reporting no acute intra-cranial abnormality. MD notes report in past 24 hours of the patient having more confusion and disorientation with low grade fevers. The nursing notes from yesterday report that the patient was having worsening confusion. The patient would benefit from Neurology follow up from SLP standpoint. Staff should maintain good oral hygiene for this patient and monitor his temperature, lung, diet, and weight status. PO should only be attempted when he is fully awake and alert. Not a FEES or MBS candidate based on this visit. Recommendations:  MD orders are for dental soft and nectar thick liquid  Patient was not alert enough for PO intake at the time of this visit.     Patient/Family/Caregiver Education:  [facility] scope of practice    Compensatory Strategies:  Per CBSE  Compensatory Swallowing Strategies: Assist feed;Small bites/sips;Eat/Feed slowly; No straws;Remain upright for 30-45 minutes after meals;Upright as possible for all oral intake      Plan:    Continued Dysphagia treatment with goals per plan of care. Discharge Recommendations: If pt discharges from hospital prior to Speech/Swallowing discharge, this note serves as tx and discharge summary. Total Treatment Time:  Swallow tx  [id number]    THIS IS A LATE ENTRY, HE WAS SEEN EARLIER TODAY       Josiah Herrmann CCC-SLP. D [employer]  5-21-19

## 2019-05-22 NOTE — PROGRESS NOTES
light and needs in reach. Alarm Set    Family Present:  No    Assessment: Treatment limited by patient's decreased cooperation during session. Will need SNF at DC to improve activity tolerance and independence. GOALS-continue all  To be met in 3 Visits:  1). Bed to toilet/BSC: Mod A via stand pivot      To be met in 5 Visits:  1). Supine to Sit: Supervision  2). Upper Body Bathing:  Min A  3). Lower Body Bathing: Mod A  4). Upper Body Dressing: Min A  5). Lower Body Dressing: Mod A  6).  Pt to ese UE exs x 15 reps        Plan: cont with [facility], OTR/L 5971          If patient discharges from this facility prior to next visit, this note will serve as the Discharge Summary

## 2019-05-22 NOTE — PROGRESS NOTES
Pt in bed with eyes closed, appears in no acute distress, respirations are even and easy. Will continue to monitor. Call light within reach. Bedside report given to [facility] Galloway RN for transfer of care.

## 2019-05-22 NOTE — PROGRESS NOTES
Hospitalist Progress Note      PCP: An Galeana MD    Date of Admission: 5/17/2019    Chief Complaint: AMS, cough, R axillary pain from ECF    Subjective:     Pt more confused this am. Disoriented. No focal neurological deficit. Running low grade fever. 5/20   Has posey  Confused     5/21  Posey removed   More alert today      Medications:  Reviewed    Infusion Medications    dextrose       Scheduled Medications    vancomycin  1,000 mg Intravenous Once    ipratropium-albuterol  1 ampule Inhalation TID    enoxaparin  40 mg Subcutaneous Daily    insulin glargine  20 Units Subcutaneous BID    insulin lispro  0-12 Units Subcutaneous TID WC    insulin lispro  0-6 Units Subcutaneous Nightly    aspirin  81 mg Oral Daily    chlorhexidine  15 mL Mouth/Throat BID    pravastatin  20 mg Oral Nightly    tamsulosin  0.4 mg Oral Daily    sodium chloride flush  10 mL Intravenous 2 times per day    cefepime  1 g Intravenous Q12H    vancomycin (VANCOCIN) intermittent dosing (placeholder)   Other RX Placeholder     PRN Meds: haloperidol lactate, ipratropium-albuterol, sodium chloride flush, magnesium hydroxide, ondansetron, glucose, dextrose, glucagon (rDNA), dextrose, acetaminophen      Intake/Output Summary (Last 24 hours) at 5/22/2019 0942  Last data filed at 5/21/2019 2313  Gross per 24 hour   Intake 310 ml   Output --   Net 310 ml       Physical Exam Performed:    /66   Pulse 73   Temp 99.5 °F (37.5 °C) (Axillary)   Resp 18   Ht 6' 1\" (1.854 m)   Wt 224 lb 12.8 oz (102 kg)   SpO2 96%   BMI 29.66 kg/m²     General appearance: No apparent distress appears stated age and cooperative. HEENT yellowish shallow ulcers roof of mouth. Neck: Supple, No jugular venous distention/bruits. Trachea midline without thyromegaly or adenopathy with full range of motion. Lungs: Clear to auscultation, bilaterally without Rales/Wheezes/Rhonchi with good respiratory effort. Heart: irregularly irregular.  No ECF patient - organism unspecified. On Vanc and cefepime.       Sepsis POA due to above   LA normal  Cultures to follow.       Afib Chronic Persistent. Has been off eliquis for the past several years - stopped due to recurrent falls. On ASA.     CAD - cont PTA regimen. ASA  Resume pravachol. Reassess for BB and ACEI once sepsis controlled.       DMII - diet controlled   Hypoglycemia 51 at [facility] - now resolved. Stop D5 infusion. Start sliding scale insulin. Add carb control diet. Add lantus and increase ISS to med dose due to rebound hyperglycemia.       Mod Pulm HTN with chronic and extensive bilateral leg edema and venous stasis dermatitis. Consider diuretic once sepsis controlled.       CKDIV - stable - monitor Cr  Baseline Cr 1.7-1.8    Acute Presumed metabolic Encephalopathy -   CT head negative. Requiring restraints today. Off restraints. More alert today. Still disoriented       DVT Prophylaxis: lovenox 30  Diet: DIET GENERAL; Carb Control: 5 carb choices (75 gms)/meal  Code Status: Full Code        Dispo - inpatient.       ot/pt  Needs SNF  RAMON Jacobo M.D.

## 2019-05-22 NOTE — PROGRESS NOTES
PM assessment completed, see flow sheet. Pt is alert but does not answer any orientation correctly. Pt aggressive verbally cursing at staff during assessment. Respirations are even & easy at rest. Pt refused to take scheduled oral medications. SR up x 2, and bed in low position. Call light is within reach.

## 2019-05-22 NOTE — PROGRESS NOTES
General Surgery - [facility], [place]  Daily Progress Note    Pt Name: Duane Jumper  Medical Record Number: [id number]  Date of Birth 6/14/1931   Today's Date: 5/22/2019    ASSESSMENT  1. CT of the chest 5/18: no axillary lesion on CT  2. Right axillary region: small open wound with soila purulent drainage noted, more purulence is expressed with applying pressure to surrounding tissues, surrounding erythema is improved  3. Pt more alert and appropriate, still a little combative with dressing change  4.  (Hgb a1c 6.5)  5. Cr 1.6  6. Blood cultures negative  7. Wound culture: + MRSA    PLAN  1. May change gauze covering right axillary wound: BID and as needed, there is no packing to be changed   2. IV antibiotics: cefepime and vanc   3. General diet   4. NPO after midnight  5. Treatment consent for incision and drainage of right axillary abscess with Dr. Karen Santana in the am. Consent to be obtained by RN from family discussed with RN. Mell Rosales has from yesterday. Pain is not well controlled. He has no nausea and no vomiting. He has passed flatus and has not had a bowel movement. He is tolerating regular diet. Current activity is up with assistance    OBJECTIVE  VITALS:  height is 6' 1\" (1.854 m) and weight is 224 lb 12.8 oz (102 kg). His axillary temperature is 98.9 °F (37.2 °C). His blood pressure is 139/68 and his pulse is 72. His respiration is 18 and oxygen saturation is 95%. VITALS:  /68   Pulse 72   Temp 98.9 °F (37.2 °C) (Axillary)   Resp 18   Ht 6' 1\" (1.854 m)   Wt 224 lb 12.8 oz (102 kg)   SpO2 95%   BMI 29.66 kg/m²   INTAKE/OUTPUT:      Intake/Output Summary (Last 24 hours) at 5/22/2019 1438  Last data filed at 5/22/2019 1249  Gross per 24 hour   Intake 550 ml   Output --   Net 550 ml     GENERAL: no distress, uncooperative    I/O last 3 completed shifts: In: 310 [P.O.:300;  I.V.:10]  Out: -   I/O this shift:  In: 240 [P.O.:240]  Out: -     LABS  Recent Labs 05/21/19  1054 05/22/19  0523   WBC 9.0 7.8   HGB 12.9* 12.9*   HCT 39.2* 38.8*   * 130*    134*   K 3.8 3.7    102   CO2 19* 19*   BUN 19 18   CREATININE 1.6* 1.6*   MG 2.10  --    PHOS 2.5  --    CALCIUM 9.3 9.1     CBC with Differential:    Lab Results   Component Value Date    WBC 7.8 05/22/2019    RBC 4.27 05/22/2019    HGB 12.9 05/22/2019    HCT 38.8 05/22/2019     05/22/2019    MCV 90.7 05/22/2019    MCH 30.2 05/22/2019    MCHC 33.3 05/22/2019    RDW 14.8 05/22/2019    SEGSPCT 68.8 05/22/2010    LYMPHOPCT 14.2 05/21/2019    MONOPCT 11.0 05/21/2019    EOSPCT 0.2 05/22/2010    BASOPCT 0.5 05/21/2019    MONOSABS 1.0 05/21/2019    LYMPHSABS 1.3 05/21/2019    EOSABS 0.3 05/21/2019    BASOSABS 0.0 05/21/2019    DIFFTYPE Auto 05/22/2010     CMP:    Lab Results   Component Value Date     05/22/2019    K 3.7 05/22/2019    K 4.1 05/18/2019     05/22/2019    CO2 19 05/22/2019    BUN 18 05/22/2019    CREATININE 1.6 05/22/2019    GFRAA 50 05/22/2019    GFRAA >60 05/22/2010    AGRATIO 1.2 05/18/2019    LABGLOM 41 05/22/2019    GLUCOSE 171 05/22/2019    PROT 6.9 05/18/2019    PROT 7.2 05/22/2010    LABALBU 3.7 05/19/2019    CALCIUM 9.1 05/22/2019    BILITOT 0.9 05/18/2019    ALKPHOS 109 05/18/2019    AST 18 05/18/2019    ALT 12 05/18/2019         Dipti Ahumada Parcel  Electronically signed 5/22/2019 at 2:38 PM     Patient seen and examined. I agree with the assessment and plan from KARISHMA Ruelas. R axilla with much less erythema. Induration persists. Pus draining when palpated. Will plan operative I&D tomorrow. He is combative, so anesthesia will be needed. Continue antibiotics.     [address]

## 2019-05-22 NOTE — PROGRESS NOTES
Bedside report and care transferred to [facility]. Pt denies further needs.

## 2019-05-22 NOTE — FLOWSHEET NOTE
05/22/19 0835   Vital Signs   Temp 99.5 °F (37.5 °C)   Temp Source Axillary   Pulse 73   Resp 18   /66   BP Location Right lower arm   Patient Position High fowlers   Level of Consciousness 0   MEWS Score 1   Oxygen Therapy   SpO2 96 %   O2 Device None (Room air)   AM assessment and meds complete. Pt sitting up wating breakfast. Denies further needs. Call light in reach. Will monitor.

## 2019-05-22 NOTE — PLAN OF CARE
Problem: Falls - Risk of:  Goal: Will remain free from falls  Description  Will remain free from falls  5/22/2019 0250 by Glen Thomas RN  Outcome: Ongoing  5/21/2019 1930 by Luis Felipe Telles RN  Outcome: Ongoing  Goal: Absence of physical injury  Description  Absence of physical injury  Outcome: Ongoing     Problem: Risk for Impaired Skin Integrity  Goal: Tissue integrity - skin and mucous membranes  Description  Structural intactness and normal physiological function of skin and  mucous membranes.   5/22/2019 0250 by Glen Thomas RN  Outcome: Ongoing  5/21/2019 1930 by Luis Felipe Telles RN  Outcome: Ongoing     Problem: Discharge Planning:  Goal: Discharged to appropriate level of care  Description  Discharged to appropriate level of care  Outcome: Ongoing  Goal: Participates in care planning  Description  Participates in care planning  Outcome: Ongoing     Problem: Airway Clearance - Ineffective:  Goal: Clear lung sounds  Description  Clear lung sounds  Outcome: Ongoing  Goal: Ability to maintain a clear airway will improve  Description  Ability to maintain a clear airway will improve  Outcome: Ongoing     Problem: Fluid Volume - Deficit:  Goal: Achieves intake and output within specified parameters  Description  Achieves intake and output within specified parameters  Outcome: Ongoing     Problem: Gas Exchange - Impaired:  Goal: Levels of oxygenation will improve  Description  Levels of oxygenation will improve  Outcome: Ongoing     Problem: Hyperthermia:  Goal: Ability to maintain a body temperature in the normal range will improve  Description  Ability to maintain a body temperature in the normal range will improve  Outcome: Ongoing     Problem: OXYGENATION/RESPIRATORY FUNCTION  Goal: Patient will maintain patent airway  Outcome: Ongoing  Goal: Patient will achieve/maintain normal respiratory rate/effort  Description  Respiratory rate and effort will be within normal limits for the patient  Outcome: Ongoing     Problem: HEMODYNAMIC STATUS  Goal: Patient has stable vital signs and fluid balance  Outcome: Ongoing     Problem: FLUID AND ELECTROLYTE IMBALANCE  Goal: Fluid and electrolyte balance are achieved/maintained  Outcome: Ongoing     Problem: ACTIVITY INTOLERANCE/IMPAIRED MOBILITY  Goal: Mobility/activity is maintained at optimum level for patient  Outcome: Ongoing     Problem: Restraint Use - Nonviolent/Non-Self-Destructive Behavior:  Goal: Absence of restraint indications  Description  Absence of restraint indications  Outcome: Ongoing  Goal: Absence of restraint-related injury  Description  Absence of restraint-related injury  Outcome: Ongoing

## 2019-05-22 NOTE — PROGRESS NOTES
Speech Language Pathology  Facility/Department: [facility]  Dysphagia Daily Treatment Note    NAME: Julia Beltran  : 1931  MRN: [id number]    Patient Diagnosis(es):   Patient Active Problem List    Diagnosis Date Noted    Acute encephalopathy 2016     Priority: High    Mass of right axilla     Pneumonia due to organism     Metabolic encephalopathy     HCAP (healthcare-associated pneumonia) 2019    Cellulitis and abscess of leg 2019    ELEANOR (acute kidney injury) ([address] 2019    Closed fracture of distal end of left radius 2019    Closed nondisplaced fracture of styloid process of left ulna 2019    Scalp laceration 2019    Multiple skin tears     Abscess     Infected decubitus ulcer, unstageable ([address] 2018    Somnolence     Acute hyperglycemia 2017    GERD (gastroesophageal reflux disease) 2017    BPH (benign prostatic hyperplasia) 2017    Type 2 diabetes mellitus with chronic kidney disease, without long-term current use of insulin ([address] 2017    Hx total left hip arthroplasty (2017) 2017    Chronic atrial fibrillation ([address] 2017    Frequent falls 2017    Chronically elevated INR 2017    Elevated brain natriuretic peptide (BNP) level 2017    Debility 2017    Former smoker 2017    Disorientation     Venous ulcers of bilateral legs 04/15/2017    Coronary artery disease involving native heart without angina pectoris 2017    Essential hypertension 2017    Chronic kidney disease 2017    Cellulitis of buttock 2017    Atrial fibrillation with slow ventricular response ([address] 2017    Delirium      Allergies: Allergies   Allergen Reactions    Oxycodone      Other reaction(s): delirium    Penicillins     Percocet [Oxycodone-Acetaminophen] Other (See Comments)     Pt. States \"I was delusional when I took that. \"    [place] Warfarin Itching    Neomycin-Bacitracin Zn-Polymyx Rash     Onset Date:   5-18-19    Subjective:  Alert in bed, periods of confusion. No family present in the room. Pain:  No report of pain    Current Diet:  Dental soft and NTL    Diet Tolerance:  Patient tolerating current diet level without staff reported signs/symptoms of penetration / aspiration. P.O. Trials: he declined  Thin       Nectar       Honey       Puree       Solid         Dysphagia Therapy: The patient was upright in bed and the patient had meal tray present. He was awake and alert with confusion. He has gag reflex response. He had open mouth posture at rest. There was minimal PO intake from the meal tray. He declined PO trials at the time of the visit, despite the presence of the tray. He had audible expiratory wheeze as per cervical auscultation of neck in area of larynx. He had a visible reduced frequency of spontaneous saliva swallows noted and no saliva swallow on cue. SLP explained safe feeding precautions. SLP explained aspiration precautions. Staff should maintain good oral hygiene for this patient and monitor his temperature, lung, diet and weight status. Hold PO intake if signs of aspiration develop. The patient had 5-22-19 WBC of 7.8. Recommendations:  Dental soft and NTL per MD orders    Patient/Family/Caregiver Education:  Explained scope of practice    Compensatory Strategies:  Per CBSE  Compensatory Swallowing Strategies: Assist feed;Small bites/sips;Eat/Feed slowly; No straws;Remain upright for 30-45 minutes after meals;Upright as possible for all oral intake        Plan:    Continued Dysphagia treatment with goals per plan of care. Discharge Recommendations: If pt discharges from hospital prior to Speech/Swallowing discharge, this note serves as tx and discharge summary. Total Treatment Time:  Swallow tx  [id number]    THIS IS A LATE ENTRY, HE WAS SEEN EARLIER TODAY       Josiah Herrmann CCC-SLP. D [employer]  5-22-19

## 2019-05-22 NOTE — PLAN OF CARE
Problem: Falls - Risk of:  Goal: Will remain free from falls  Description  Will remain free from falls  Outcome: Ongoing  Goal: Absence of physical injury  Description  Absence of physical injury  Outcome: Ongoing     Problem: Risk for Impaired Skin Integrity  Goal: Tissue integrity - skin and mucous membranes  Description  Structural intactness and normal physiological function of skin and  mucous membranes.   Outcome: Ongoing     Problem: Discharge Planning:  Goal: Discharged to appropriate level of care  Description  Discharged to appropriate level of care  Outcome: Ongoing  Goal: Participates in care planning  Description  Participates in care planning  Outcome: Ongoing     Problem: Airway Clearance - Ineffective:  Goal: Clear lung sounds  Description  Clear lung sounds  Outcome: Ongoing  Goal: Ability to maintain a clear airway will improve  Description  Ability to maintain a clear airway will improve  Outcome: Ongoing     Problem: Fluid Volume - Deficit:  Goal: Achieves intake and output within specified parameters  Description  Achieves intake and output within specified parameters  Outcome: Ongoing     Problem: Gas Exchange - Impaired:  Goal: Levels of oxygenation will improve  Description  Levels of oxygenation will improve  Outcome: Ongoing     Problem: Hyperthermia:  Goal: Ability to maintain a body temperature in the normal range will improve  Description  Ability to maintain a body temperature in the normal range will improve  Outcome: Ongoing     Problem: OXYGENATION/RESPIRATORY FUNCTION  Goal: Patient will maintain patent airway  Outcome: Ongoing

## 2019-05-22 NOTE — PROGRESS NOTES
Inpatient Physical Therapy Daily Treatment Note    Unit: 2w  Date:  5/22/2019  Patient Name:    Jillian Piedra  Admitting diagnosis:  HCAP (healthcare-associated pneumonia) [J18.9]  Admit Date:  5/17/2019  Precautions/Restrictions:  fall risk, IV, bed/chair alarm, confusion and telesitter, [place], agitated and combative (on 5/21; not seen today)       Discharge Recommendations: SNF  DME needs for discharge: defer to facility       Therapy recommendations for staff:   Assist of 2 with use of STEDY for all transfers to/from chair if pt cooperative     [facility] S4 Level Recommendation: NA  AM-PAC Mobility Score      AM-PAC Inpatient Mobility without Stair Climbing Raw Score : 9    Treatment Time: 4892-6365  Treatment number: 3  Timed Code Treatment Minutes: 34 minutes  Total Treatment Minutes:  34 minutes    Cognition    A&O x0, but did respond to his name. Able to follow one step commands intermittently (~50% of the time)     Subjective  Patient lying supine in bed with no family present  Pt agreeable to this PT tx. Pain   No  Rating:  NA/10  Location:   Pain Medicine Status: Denies need     Bed Mobility   Supine to Sit:   Mod A  Sit to Supine:  Min A of 2  Rolling:   Min A  Scooting: Max A of 2 to HOB, max A x1 at EOB    Transfer Training     Sit to stand: Max A with second therapist holding pt's feet in place, to Carolynne Pucker with bed heigh raised  Stand to sit: Max A from [facility]  Bed to Chair:  Not Tested with use of N/A    Gait Training gait deferred due to difficulty with transfers  Distance:   ft  Deviations (firm surface/linoleum): N/A   Assistive Device Used:  N/A  Level of Assist: N/A  Comment:     Stair Training deferred, pt unsafe/not appropriate to complete stairs at this time  Pt ascended/descended  stairs with N/A with N/A, and use of N/A.   Pattern: N/A    Therapeutic Exercise [place] not completed this date (attempted, but pt stated \"I can't\")   Ankle pumps:  Quad sets:   Glut sets:   Heel slides: SLR:   Hip abd/add:   LAQ:   Marching:     Balance  Static Sitting:  Good -    Tolerance: sat EOB approx. 20 min with SBA  Dynamic Sitting:  Good -   Static Standing: Fair    Tolerance: approx. 1 min   Dynamic Standing: Not tested    Patient Education      Role of PT, POC, Discharge recommendations, safety awareness and transfer techniques. Positioning Needs       Pt returned to bed, call light and needs in reach and alarm set. ROM Measurements N/A  Knee Flexion:  Knee Extension:     Activity Tolerance   Pt completed therapy session with No nausea, dizziness, pain or SOB noted w/activity. SpO2:   HR:  BP:     Other  None. Assessment :  Patient progressing well this morning, tolerating increased mobility and requiring reduced assistance. Pt continues to have difficulty following commands, making progressing mobility challenging. Continue to recommend SNF at [place]. Goals (all goals ongoing unless otherwise indicated)  To be met in 3 visits:  1). Patient able to stand with STEDY MOD A of 2. - Met 5/22     To be met in 6 visits:  1). Supine to/from sit CGA of 1.  2). Sit to/from stand MOD A of 1.  3). Bed to chair MOD A of 1 via STEDY. 4). Patient able to  STEDY x2 minutes CGA. 5). Tolerate B LE exercises 10 reps. Plan   Continue with plan of care. Brett Bower, PT, DPT #[id number]     If patient discharges from this facility prior to next visit, this note will serve as the Discharge Summary.

## 2019-05-22 NOTE — PROGRESS NOTES
Pharmacy note:  Vancomycin Day #  6  WBC                BUN/SCr       CrCl               Tmax   7.8                  18/1.6              41 ml/min      99.2    Vancomycin level = 17.2    Give vancomycin 1000 mg IVPB today and check trough tomorrow. John Morris. 5/22/2019 7:10 AM

## 2019-05-23 NOTE — PROGRESS NOTES
No murmurs. Abdomen: Soft, non-tender or non-distended without rigidity or guarding and positive bowel sounds all four quadrants. Extremities: 1+ pitting edema with chronic venous stasis dermatitis. Neurologic:disoriented,awake         Labs:   Recent Labs     05/21/19  1054 05/22/19  0523   WBC 9.0 7.8   HGB 12.9* 12.9*   HCT 39.2* 38.8*   * 130*     Recent Labs     05/21/19  1054 05/22/19  0523    134*   K 3.8 3.7    102   CO2 19* 19*   BUN 19 18   CREATININE 1.6* 1.6*   CALCIUM 9.3 9.1   PHOS 2.5  --      No results for input(s): AST, ALT, BILIDIR, BILITOT, ALKPHOS in the last 72 hours. No results for input(s): INR in the last 72 hours. No results for input(s): Willia Beverage in the last 72 hours. Urinalysis:      Lab Results   Component Value Date    NITRU Negative 05/17/2019    WBCUA 0-2 05/17/2019    BACTERIA 1+ 07/10/2017    RBCUA 0-2 05/17/2019    BLOODU Negative 05/17/2019    SPECGRAV 1.010 05/17/2019    GLUCOSEU Negative 05/17/2019    GLUCOSEU 250 mg/dL 05/22/2010       Radiology:  CT HEAD WO CONTRAST   Final Result   1. No acute intracranial abnormality. CT CHEST WO CONTRAST   Final Result   No axillary lesion is seen by CT. Small pleural effusions. XR CHEST STANDARD (2 VW)   Final Result   Cardiomegaly and mild bilateral congestion with left basilar infiltrate   representing atelectasis versus pneumonia. XR CHEST PORTABLE   Final Result   Question right mid to upper lung airspace disease, atelectasis or pneumonia. Follow-up to resolution is recommended. Assessment/Plan:    Active Hospital Problems    Diagnosis    Mass of right axilla [R22.31]    Pneumonia due to organism [C22.1]    Metabolic encephalopathy [E66.62]    HCAP (healthcare-associated pneumonia) [J18.9]       R axillary Abscess  Iv Vanc and cefepime. Surgery eval for I&D - s/p bedside I&D - cont wound care. MRSA in culture. repeat I&D in OR today      Possible RML and RUL PNA in an ECF patient - organism unspecified. On Vanc and cefepime.       Sepsis POA due to above   LA normal  Cultures to follow.       Afib Chronic Persistent. Has been off eliquis for the past several years - stopped due to recurrent falls. On ASA.     CAD - cont PTA regimen. ASA  Resume pravachol. Reassess for BB and ACEI once sepsis controlled.       DMII - diet controlled   Hypoglycemia 51 at [facility] - now resolved. Stop D5 infusion. Start sliding scale insulin. Add carb control diet. Add lantus and increase ISS to med dose due to rebound hyperglycemia.       Mod Pulm HTN with chronic and extensive bilateral leg edema and venous stasis dermatitis. Consider diuretic once sepsis controlled.       CKDIV - stable - monitor Cr  Baseline Cr 1.7-1.8    Acute Presumed metabolic Encephalopathy -   CT head negative. Requiring restraints today. Off restraints. More alert today. Still disoriented       DVT Prophylaxis: lovenox 30  Diet: DIET GENERAL; Carb Control: 5 carb choices (75 gms)/meal  Code Status: Full Code        Dispo - inpatient.       ot/pt  Needs SNF  RAMON Jacobo M.D.

## 2019-05-23 NOTE — OP NOTE
[address]                 [address] [facility] [address]                                OPERATIVE REPORT    PATIENT NAME: Franca Craven                      :        1931  MED REC NO:   [id number]                          ROOM:       67  ACCOUNT NO:   [de-identified]                           ADMIT DATE: 2019  PROVIDER:     Beronica Mejia MD    DATE OF PROCEDURE:  2019    PREOPERATIVE DIAGNOSIS:  Right axillary abscess. POSTOPERATIVE DIAGNOSIS:  Right axillary abscess. PROCEDURE:  Complex incision and drainage of right axillary abscess. SURGEON:  Beronica Mejia MD    ANESTHESIA:  Local with MAC. INDICATIONS:  The patient is an 80-year-old gentleman with a persistent  right axillary abscess. He is brought in for incision and drainage. OPERATIVE SUMMARY:  After preoperative evaluation, the patient was  brought in the operating suite and placed in a comfortable supine  position on the operating room table. Monitoring equipment was attached  and he was given intravenous sedation per Anesthesia. His right axilla  was sterilely prepped and draped and anesthetized with local anesthetic. Incision was made into the abscess and some purulent material was  evacuated. Loculations were broken up with the finger and the wound was  irrigated. It was packed with gauze and a dry dressing was applied. The patient tolerated the procedure well and was taken to the recovery  area in stable condition.         Amelia Munoz MD    D: 2019 10:33:09       T: 2019 12:55:23     [email or web address]  Job#: [id number]     Doc#: [id number]    CC:  Dr Celine Borrero

## 2019-05-23 NOTE — PROGRESS NOTES
Vanc Day 6  Vanc random this am 18.7mcg/ml, will reorder 750mg today.  Random in am, continue to pulse dose  Marcus Ortiz Pharm D 7/81/97931:96 AM  .

## 2019-05-23 NOTE — ANESTHESIA PRE PROCEDURE
Department of Anesthesiology  Preprocedure Note       Name:  Bri Peters   Age:  80 y.o.  :  1931                                          MRN:  [id number]         Date:  2019      Surgeon: Anette Melissa):  Geo Pace MD    Procedure: AXILLARY ABSCESS INCISION AND DRAINAGE (Right )    Medications prior to admission:   Prior to Admission medications    Medication Sig Start Date End Date Taking? Authorizing Provider   doxycycline hyclate (VIBRAMYCIN) 100 MG capsule Take 100 mg by mouth 2 times daily 5/15/19 5/23/19 Yes Historical Provider, MD   insulin lispro (HUMALOG) 100 UNIT/ML injection vial Inject into the skin 3 times daily (before meals) Sliding scale   Yes Historical Provider, MD   pantoprazole (PROTONIX) 40 MG tablet Take 40 mg by mouth daily   Yes Historical Provider, MD   rosuvastatin (CRESTOR) 5 MG tablet Take 5 mg by mouth nightly   Yes Historical Provider, MD   traMADol (ULTRAM) 50 MG tablet Take 50 mg by mouth every 6 hours as needed for Pain. Yes Historical Provider, MD   hydrOXYzine (ATARAX) 10 MG tablet Take 25 mg by mouth three times daily    Yes Historical Provider, MD   gabapentin (NEURONTIN) 100 MG capsule Take 300 mg by mouth daily. Yes Historical Provider, MD   betamethasone dipropionate (DIPROLENE) 0.05 % cream Apply topically 2 times daily Apply topically 2 times daily.    Yes Historical Provider, MD   magnesium hydroxide (MILK OF MAGNESIA) 400 MG/5ML suspension Take 30 mLs by mouth daily as needed for Constipation   Yes Historical Provider, MD   insulin aspart protamine-insulin aspart (NOVOLOG MIX 70/30) (70-30) 100 UNIT/ML injection Inject 10 Units into the skin 2 times daily (with meals) 65 units in am, 45 units in pm  Patient taking differently: Inject 65 Units into the skin every evening 65 units in am, 45 units in pm 17  Yes Vish Linares MD   nitroGLYCERIN (NITROSTAT) 0.4 MG SL tablet Place 0.4 mg under the tongue every 5 minutes as needed for Chest pain Up to 3 tablets. If chest pain continues call 911. Yes Historical Provider, MD   tamsulosin (FLOMAX) 0.4 MG capsule Take 0.4 mg by mouth daily    Yes Historical Provider, MD   acetaminophen (TYLENOL) 325 MG tablet Take 650 mg by mouth every 6 hours as needed for Pain    Historical Provider, MD   omeprazole (PRILOSEC) 20 MG delayed release capsule Take 40 mg by mouth daily    Historical Provider, MD   pravastatin (PRAVACHOL) 80 MG tablet Take 80 mg by mouth daily.     Historical Provider, MD       Current medications:    Current Facility-Administered Medications   Medication Dose Route Frequency Provider Last Rate Last Dose    vancomycin (VANCOCIN) 750 mg in dextrose 5 % 250 mL IVPB  750 mg Intravenous Once Kurt Cotter MD        ipratropium-albuterol (DUONEB) nebulizer solution 1 ampule  1 ampule Inhalation TID Kurt Cotter MD   1 ampule at 05/23/19 0658    enoxaparin (LOVENOX) injection 40 mg  40 mg Subcutaneous Daily Nivia Santiago MD        haloperidol lactate (HALDOL) injection 5 mg  5 mg Intramuscular Q12H PRN Leona Tomlin MD        insulin glargine (LANTUS) injection vial 20 Units  20 Units Subcutaneous BID Nivia Santiago MD   20 Units at 05/22/19 2009    insulin lispro (HUMALOG) injection vial 0-12 Units  0-12 Units Subcutaneous TID WC Nivia Santiago MD   2 Units at 05/22/19 1657    insulin lispro (HUMALOG) injection vial 0-6 Units  0-6 Units Subcutaneous Nightly Nivia Santiago MD   1 Units at 05/22/19 2009    ipratropium-albuterol (DUONEB) nebulizer solution 1 ampule  1 ampule Inhalation Q4H PRN Kurt Cotter MD        aspirin chewable tablet 81 mg  81 mg Oral Daily Nivia Santiago MD   81 mg at 05/22/19 0842    chlorhexidine (PERIDEX) 0.12 % solution 15 mL  15 mL Mouth/Throat BID Nivia Santiago MD   15 mL at 05/22/19 2009    pravastatin (PRAVACHOL) tablet 20 mg  20 mg Oral Nightly Nivia Santiago MD   20 mg at 05/22/19 2009    tamsulosin (FLOMAX) capsule 0.4 mg  0.4 mg Oral Daily Estefani Parra MD   0.4 mg at 05/22/19 0842    sodium chloride flush 0.9 % injection 10 mL  10 mL Intravenous 2 times per day Saima Perkins MD   10 mL at 05/21/19 2313    sodium chloride flush 0.9 % injection 10 mL  10 mL Intravenous PRN Saima Perkins MD        magnesium hydroxide (MILK OF MAGNESIA) 400 MG/5ML suspension 30 mL  30 mL Oral Daily PRN Saima Perkins MD        [facility] injection 4 mg  4 mg Intravenous Q6H PRN Saima Perkins MD        glucose (GLUTOSE) 40 % oral gel 15 g  15 g Oral PRN Saima Perkins MD        dextrose 50 % solution 12.5 g  12.5 g Intravenous PRN Saima Perkins MD        glucagon (rDNA) injection 1 mg  1 mg Intramuscular PRN Saima Perkins MD        dextrose 5 % solution  100 mL/hr Intravenous PRN Saima Perkins MD        acetaminophen (TYLENOL) tablet 650 mg  650 mg Oral Q4H PRN Saima Perkins MD   650 mg at 05/22/19 2252    cefepime (MAXIPIME) 1 g IVPB minibag  1 g Intravenous Q12H Saima Perkins MD   Stopped at 05/22/19 2246    vancomycin (VANCOCIN) intermittent dosing (placeholder)   Other RX Placeholder Saima Perkins MD           Allergies: Allergies   Allergen Reactions    Oxycodone      Other reaction(s): delirium    Penicillins     Percocet [Oxycodone-Acetaminophen] Other (See Comments)     Pt. States \"I was delusional when I took that. \"     Warfarin Itching    Neomycin-Bacitracin Zn-Polymyx Rash       Problem List:    Patient Active Problem List   Diagnosis Code    Acute encephalopathy G93.40    Delirium R41.0    Coronary artery disease involving native heart without angina pectoris I25.10    Essential hypertension 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    Hx total left hip arthroplasty (July 2017) Y52.957    Chronic atrial fibrillation (HCC) I48.2    Frequent falls R29.6    Chronically elevated INR R79.1    Elevated brain natriuretic peptide (BNP) level R79.89    Debility R53.81    Former smoker Z87.891    Disorientation R41.0    Somnolence R40.0    Infected decubitus ulcer, unstageable (HCC) L89.95, L08.9    Abscess L02.91    ELEANOR (acute kidney injury) ([facility] [address] N17.9    Closed fracture of distal end of left radius S52.502A    Closed nondisplaced fracture of styloid process of left ulna S52.615A    Scalp laceration S01. 01XA    Multiple skin tears T14. 8XXA    Cellulitis and abscess of leg L03.119, L02.419    HCAP (healthcare-associated pneumonia) J18.9    Pneumonia due to organism B78.5    Metabolic encephalopathy D32.25    Mass of right axilla R22.31       Past Medical History:        Diagnosis Date    A-fib [facility])     Atrial fibrillation (HCC)     CAD (coronary artery disease)     Cataract     CHF (congestive heart failure) (HCC)     Chronic kidney disease     stage 3    CKD (chronic kidney disease)     Diabetes mellitus (HCC)     Femur fracture (HCC)     GERD (gastroesophageal reflux disease)     Hyperlipidemia     Hypertension     MI (myocardial infarction) ([facility] [address]     MRSA (methicillin resistant staph aureus) culture positive 05/20/2019    abscess - rt axilla       Past Surgical History:        Procedure Laterality Date    ANKLE SURGERY      APPENDECTOMY      CORONARY ARTERY BYPASS GRAFT      quadruple    CYSTOSCOPY N/A 02/08/2017    URETHRAL DILATATION    HIP SURGERY Left 07/10/2017    LEFT HIP HEMIARTHROPLASTY       UPPER GASTROINTESTINAL ENDOSCOPY  4/18/16       Social History:    Social History     Tobacco Use    Smoking status: Former Smoker Packs/day: 0.50     Years: 15.00     Pack years: 7.50    Smokeless tobacco: Current User     Types: Chew   Substance Use Topics    Alcohol use: No                                Ready to quit: Not Answered  Counseling given: Not Answered      Vital Signs (Current):   Vitals:    05/22/19 1322 05/22/19 1912 05/22/19 2001 05/23/19 0208   BP:   130/67 (!) 141/70   Pulse:   77 62   Resp:  18 18 16   Temp:   99.8 °F (37.7 °C) 99.5 °F (37.5 °C)   TempSrc:   Axillary Oral   SpO2: 95% 95% 96% 96%   Weight:       Height:                                                  BP Readings from Last 3 Encounters:   05/23/19 (!) 141/70   03/20/19 (!) 157/81   02/22/19 123/60       NPO Status:                                                                                 BMI:   Wt Readings from Last 3 Encounters:   05/22/19 224 lb 12.8 oz (102 kg)   03/17/19 253 lb 15.5 oz (115.2 kg)   02/19/19 230 lb (104.3 kg)     Body mass index is 29.66 kg/m².     CBC:   Lab Results   Component Value Date    WBC 7.8 05/22/2019    RBC 4.27 05/22/2019    HGB 12.9 05/22/2019    HCT 38.8 05/22/2019    MCV 90.7 05/22/2019    RDW 14.8 05/22/2019     05/22/2019       CMP:   Lab Results   Component Value Date     05/22/2019    K 3.7 05/22/2019    K 4.1 05/18/2019     05/22/2019    CO2 19 05/22/2019    BUN 18 05/22/2019    CREATININE 1.6 05/22/2019    GFRAA 50 05/22/2019    GFRAA >60 05/22/2010    AGRATIO 1.2 05/18/2019    LABGLOM 41 05/22/2019    GLUCOSE 171 05/22/2019    PROT 6.9 05/18/2019    PROT 7.2 05/22/2010    CALCIUM 9.1 05/22/2019    BILITOT 0.9 05/18/2019    ALKPHOS 109 05/18/2019    AST 18 05/18/2019    ALT 12 05/18/2019       POC Tests:   Recent Labs     05/23/19  0706   POCGLU 132*       Coags:   Lab Results   Component Value Date    PROTIME 14.3 05/17/2019    INR 1.25 05/17/2019    APTT 29.4 07/09/2017       HCG (If Applicable): No results found for: PREGTESTUR, PREGSERUM, HCG, HCGQUANT     ABGs:   Lab Results   Component  Warfarin Itching    Neomycin-Bacitracin Zn-Polymyx Rash       Social History     Tobacco Use    Smoking status: Former Smoker     Packs/day: 0.50     Years: 15.00     Pack years: 7.50    Smokeless tobacco: Current User     Types: Chew   Substance Use Topics    Alcohol use: No       LABS:    CBC  Lab Results   Component Value Date/Time    WBC 7.8 05/22/2019 05:23 AM    HGB 12.9 (L) 05/22/2019 05:23 AM    HCT 38.8 (L) 05/22/2019 05:23 AM     (L) 05/22/2019 05:23 AM     RENAL  Lab Results   Component Value Date/Time     (L) 05/22/2019 05:23 AM    K 3.7 05/22/2019 05:23 AM    K 4.1 05/18/2019 05:46 AM     05/22/2019 05:23 AM    CO2 19 (L) 05/22/2019 05:23 AM    BUN 18 05/22/2019 05:23 AM    CREATININE 1.6 (H) 05/22/2019 05:23 AM    GLUCOSE 171 (H) 05/22/2019 05:23 AM     COAGS  Lab Results   Component Value Date/Time    PROTIME 14.3 (H) 05/17/2019 03:08 PM    INR 1.25 (H) 05/17/2019 03:08 PM    APTT 29.4 07/09/2017 08:55 PM     Nigel Guadalupe MD   5/23/2019

## 2019-05-23 NOTE — FLOWSHEET NOTE
05/23/19 1130   Vital Signs   Temp 98.9 °F (37.2 °C)   Temp Source Axillary   Pulse 67   Resp 16   BP (!) 159/80   BP Location Left upper arm   Patient Position Semi fowlers   Level of Consciousness 0   MEWS Score 1   Oxygen Therapy   SpO2 94 %   O2 Device None (Room air)   Pt returned from surgery. Denies any pain. Call light in reach. Will monitor.

## 2019-05-23 NOTE — PROGRESS NOTES
PM assessment completed, see flow sheet. Pt is alert and oriented to self only, pt is answering questions better this evening and much more pleasant. Pt very cooperative. Respirations are even & easy at rest.  pt scratching and caused abrasions to scrotum. Cleaned with barrier wipes. No complaints voiced. Pt denies needs at this time. SR up x 2, and bed in low position. Call light is within reach.

## 2019-05-23 NOTE — PLAN OF CARE
Problem: Falls - Risk of:  Goal: Will remain free from falls  Description  Will remain free from falls  5/23/2019 1636 by Gin Winslow RN  Outcome: Ongoing  5/23/2019 0414 by Louetta Libman, RN  Outcome: Ongoing  Goal: Absence of physical injury  Description  Absence of physical injury  5/23/2019 1636 by Gin Winslow RN  Outcome: Ongoing  5/23/2019 0414 by Louetta Libman, RN  Outcome: Ongoing     Problem: Risk for Impaired Skin Integrity  Goal: Tissue integrity - skin and mucous membranes  Description  Structural intactness and normal physiological function of skin and  mucous membranes.   5/23/2019 1636 by Gin Winslow RN  Outcome: Ongoing  5/23/2019 0414 by Louetta Libman, RN  Outcome: Ongoing     Problem: Discharge Planning:  Goal: Discharged to appropriate level of care  Description  Discharged to appropriate level of care  5/23/2019 1636 by Gin Winslow RN  Outcome: Ongoing  5/23/2019 0414 by Louetta Libman, RN  Outcome: Ongoing  Goal: Participates in care planning  Description  Participates in care planning  5/23/2019 1636 by Gin Winslow RN  Outcome: Ongoing  5/23/2019 0414 by Louetta Libman, RN  Outcome: Ongoing     Problem: Airway Clearance - Ineffective:  Goal: Clear lung sounds  Description  Clear lung sounds  5/23/2019 1636 by Gin Winslow RN  Outcome: Ongoing  5/23/2019 0414 by Louetta Libman, RN  Outcome: Ongoing  Goal: Ability to maintain a clear airway will improve  Description  Ability to maintain a clear airway will improve  5/23/2019 0414 by Louetta Libman, RN  Outcome: Ongoing     Problem: Fluid Volume - Deficit:  Goal: Achieves intake and output within specified parameters  Description  Achieves intake and output within specified parameters  5/23/2019 0414 by Louetta Libman, RN  Outcome: Ongoing     Problem: Gas Exchange - Impaired:  Goal: Levels of oxygenation will improve  Description  Levels of oxygenation will improve  5/23/2019 1636 by Gin Winslow RN  Outcome: Ongoing  5/23/2019 0414 by Glen Thomas RN  Outcome: Ongoing     Problem: Hyperthermia:  Goal: Ability to maintain a body temperature in the normal range will improve  Description  Ability to maintain a body temperature in the normal range will improve  5/23/2019 0414 by Glen Thomas RN  Outcome: Ongoing     Problem: OXYGENATION/RESPIRATORY FUNCTION  Goal: Patient will maintain patent airway  5/23/2019 1636 by Janeth Gandhi RN  Outcome: Ongoing  5/23/2019 0414 by Glen Thomas RN  Outcome: Ongoing  Goal: Patient will achieve/maintain normal respiratory rate/effort  Description  Respiratory rate and effort will be within normal limits for the patient  5/23/2019 0414 by Glen Thomas RN  Outcome: Ongoing     Problem: HEMODYNAMIC STATUS  Goal: Patient has stable vital signs and fluid balance  5/23/2019 0414 by Glen Thomas RN  Outcome: Ongoing     Problem: FLUID AND ELECTROLYTE IMBALANCE  Goal: Fluid and electrolyte balance are achieved/maintained  5/23/2019 0414 by Glen Thomas RN  Outcome: Ongoing     Problem: ACTIVITY INTOLERANCE/IMPAIRED MOBILITY  Goal: Mobility/activity is maintained at optimum level for patient  5/23/2019 1636 by Janeth Gandhi RN  Outcome: Ongoing  5/23/2019 0414 by Glen Thomas RN  Outcome: Ongoing     Problem: Restraint Use - Nonviolent/Non-Self-Destructive Behavior:  Goal: Absence of restraint indications  Description  Absence of restraint indications  5/23/2019 0414 by Glen Thomas RN  Outcome: Ongoing  Goal: Absence of restraint-related injury  Description  Absence of restraint-related injury  5/23/2019 0414 by Glen Thomas RN  Outcome: Ongoing

## 2019-05-23 NOTE — PROGRESS NOTES
Pt in bed with eyes closed, appears in no acute distress, respirations are even and easy. Will continue to monitor. Call light within reach. Report given to [facility] [place] RN for transfer of care.

## 2019-05-23 NOTE — BRIEF OP NOTE
Brief Postoperative Note  ______________________________________________________________    Patient: Hodan Foot  YOB: 1931  MRN: [id number]  Date of Procedure: 5/23/2019    Pre-Op Diagnosis: RIGHT AXILLARY ABSCESS    Post-Op Diagnosis: Same       Procedure(s):  RIGHT AXILLARY ABSCESS INCISION AND DRAINAGE    Anesthesia: General    Surgeon(s):  Timur Jordan MD    Assistant: Manuel Keen    Estimated Blood Loss (mL): less than 50     Complications: None    Specimens:   * No specimens in log *    Implants:  * No implants in log *      Drains: * No LDAs found *    Findings: as above    Mayi Moon MD  Date: 5/23/2019  Time: 10:19 AM

## 2019-05-23 NOTE — ANESTHESIA POSTPROCEDURE EVALUATION
05:23 AM        CO2                      19 (L)              05/22/2019 05:23 AM        BUN                      18                  05/22/2019 05:23 AM        CREATININE               1.6 (H)             05/22/2019 05:23 AM        GLUCOSE                  171 (H)             05/22/2019 05:23 AM   COAGS  Lab Results       Component                Value               Date/Time                  PROTIME                  14.3 (H)            05/17/2019 03:08 PM        INR                      1.25 (H)            05/17/2019 03:08 PM        APTT                     29.4                07/09/2017 08:55 PM     Intake & Output:  @29HYFX@    Nausea & Vomiting:  No    Level of Consciousness:  Awake    Pain Assessment:  Adequate analgesia    Anesthesia Complications:  No apparent anesthetic complications    SUMMARY      Vital signs stable  OK to discharge from Stage I post anesthesia care.   Care transferred from Anesthesiology department on discharge from perioperative area

## 2019-05-23 NOTE — PROGRESS NOTES
Transport here to take pt back to [address] [place] [address]. Pt in stable condition at this time. Pt denies pain or other needs.

## 2019-05-23 NOTE — PLAN OF CARE
improve  Description  Levels of oxygenation will improve  5/23/2019 0414 by Rikki Ware RN  Outcome: Ongoing  5/22/2019 1816 by Rosa Johnson RN  Outcome: Ongoing     Problem: Hyperthermia:  Goal: Ability to maintain a body temperature in the normal range will improve  Description  Ability to maintain a body temperature in the normal range will improve  5/23/2019 0414 by Rikki Ware RN  Outcome: Ongoing  5/22/2019 1816 by Rosa Johnson RN  Outcome: Ongoing     Problem: OXYGENATION/RESPIRATORY FUNCTION  Goal: Patient will maintain patent airway  5/23/2019 0414 by Rikki Ware RN  Outcome: Ongoing  5/22/2019 1816 by Rosa Johnson RN  Outcome: Ongoing  Goal: Patient will achieve/maintain normal respiratory rate/effort  Description  Respiratory rate and effort will be within normal limits for the patient  Outcome: Ongoing     Problem: HEMODYNAMIC STATUS  Goal: Patient has stable vital signs and fluid balance  Outcome: Ongoing     Problem: FLUID AND ELECTROLYTE IMBALANCE  Goal: Fluid and electrolyte balance are achieved/maintained  Outcome: Ongoing     Problem: ACTIVITY INTOLERANCE/IMPAIRED MOBILITY  Goal: Mobility/activity is maintained at optimum level for patient  Outcome: Ongoing     Problem: Restraint Use - Nonviolent/Non-Self-Destructive Behavior:  Goal: Absence of restraint indications  Description  Absence of restraint indications  Outcome: Ongoing  Goal: Absence of restraint-related injury  Description  Absence of restraint-related injury  Outcome: Ongoing

## 2019-05-23 NOTE — PROGRESS NOTES
Speech Language Pathology  SPEECH NOTE: Curtain pulled and patient not available, being cleaned by staff. No charges. Meredith Rees CCC-SLP. D [employer]  5-23-19

## 2019-05-23 NOTE — FLOWSHEET NOTE
05/23/19 1024   Vital Signs   Temp 97.4 °F (36.3 °C)   Temp Source Temporal   Pulse 69   Heart Rate Source Monitor   Resp 18   /70   Patient Position Semi fowlers   Level of Consciousness 0   MEWS Score 1   Pain Assessment   Pain Assessment Faces   Pain Level 0   POSS Score (Patient Ctrl Analgesia) Slightly drowsy, easily arousable   Oxygen Therapy   SpO2 94 %   Pulse Oximeter Device Mode Continuous   Pulse Oximeter Device Location Finger   O2 Device None (Room air)   AM assessment complete. PO meds held for surgery today. Pt sitting up in bed. Denies further needs. Call light in reach. Will monitor.

## 2019-05-24 PROBLEM — E44.0 MODERATE PROTEIN-CALORIE MALNUTRITION (HCC): Status: ACTIVE | Noted: 2019-01-01

## 2019-05-24 NOTE — PROGRESS NOTES
RESPIRATORY THERAPY ASSESSMENT    Name:  [id number] [facility] Record Number:  [id number]  Age: 80 y.o. Gender: male  : 1931  Today's Date:  2019  Room:  [address] [place] [address]-    Assessment     Is the patient being admitted for a COPD or Asthma exacerbation? No   (If yes the patient will be seen every 4 hours for the first 24 hours and then reassessed)    Patient Admission Diagnosis      Allergies  Allergies   Allergen Reactions    Oxycodone      Other reaction(s): delirium    Penicillins     Percocet [Oxycodone-Acetaminophen] Other (See Comments)     Pt. States \"I was delusional when I took that. \"     Warfarin Itching    Neomycin-Bacitracin Zn-Polymyx Rash       Minimum Predicted Vital Capacity:               Actual Vital Capacity:      Patient unable to preform              Pulmonary History:CHF/Pulmonary Edema  Home Oxygen Therapy:  room air  Home Respiratory Therapy:None   Current Respiratory Therapy:  duoneb Q4WA, duoneb PRN  Treatment Type: Treatment missed, HHN  Medications: Albuterol/Ipratropium    Respiratory Severity Index(RSI)   Patients with orders for inhalation medications, oxygen, or any therapeutic treatment modality will be placed on Respiratory Protocol. They will be assessed with the first treatment and at least every 72 hours thereafter. The following severity scale will be used to determine frequency of treatment intervention.     Smoking History: No Smoking History = 0    Social History  Social History     Tobacco Use    Smoking status: Former Smoker     Packs/day: 0.50     Years: 15.00     Pack years: 7.50    Smokeless tobacco: Current User     Types: Chew   Substance Use Topics    Alcohol use: No    Drug use: No       Recent Surgical History: None = 0  Past Surgical History  Past Surgical History:   Procedure Laterality Date    ANKLE SURGERY      APPENDECTOMY      CORONARY ARTERY BYPASS GRAFT      quadruple    CYSTOSCOPY N/A 2017    URETHRAL DILATATION    HIP SURGERY Left 07/10/2017    LEFT HIP HEMIARTHROPLASTY       INCISION AND DRAINAGE Right 5/23/2019    AXILLARY ABSCESS INCISION AND DRAINAGE performed by Robby Berrios MD at [address] Right 05/23/2019     AXILLARY ABSCESS INCISION AND DRAINAGE (Right )    UPPER GASTROINTESTINAL ENDOSCOPY  4/18/16       Level of Consciousness: Alert, Follows Commands but Disoriented = 1    Level of Activity: Walking with assistance = 1    Respiratory Pattern: Regular Pattern; RR 8-20 = 0    Breath Sounds: Clear = 0    Sputum   ,  , Sputum How Obtained: None  Cough: Strong, spontaneous, non-productive = 0    Vital Signs   BP (!) 153/86   Pulse 83   Temp 98.6 °F (37 °C) (Oral)   Resp 17   Ht 6' 1\" (1.854 m)   Wt 224 lb 12.8 oz (102 kg)   SpO2 99%   BMI 29.66 kg/m²   SPO2 (COPD values may differ): Greater than or equal to 92% on room air = 0    Peak Flow (asthma only): not applicable = 0    RSI: 0-4 = See once and convert to home regimen or discontinue        Plan       Goals: medication delivery    Patient/caregiver was educated on the proper method of use for Respiratory Care Devices:  Yes      Level of patient/caregiver understanding able to:   ? Verbalize understanding   ? Demonstrate understanding       ? Teach back        ? Needs reinforcement       ? No available caregiver               ? Other:     Response to education:  Good     Is patient being placed on Home Treatment Regimen? No     Does the patient have everything they need prior to discharge? NA     Comments: patient chart reviewed, patient assessed. Plan of Care: change patient to duoneb Q4 PRN    Electronically signed by Brandt Díaz RCP on 5/24/2019 at 1:15 AM    Respiratory Protocol Guidelines     1. Assessment and treatment by Respiratory Therapy will be initiated for medication and therapeutic interventions upon initiation of aerosolized medication.   2. Physician will be contacted for respiratory rate (RR) greater than 35 breaths per minute. Therapy will be held for heart rate (HR) greater than 140 beats per minute, pending direction from physician. 3. Bronchodilators will be administered via Metered Dose Inhaler (MDI) with spacer when the following criteria are met:  a. Alert and cooperative     b. HR < 140 bpm  c. RR < 30 bpm                d. Can demonstrate a 2-3 second inspiratory hold  4. Bronchodilators will be administered via Hand Held Nebulizer AIMEE [facility]) to patients when ANY of the following criteria are met  a. Incognizant or uncooperative          b. Patients treated with HHN at Home        c. Unable to demonstrate proper use of MDI with spacer     d. RR > 30 bpm   5. Bronchodilators will be delivered via Metered Dose Inhaler (MDI), HHN, Aerogen to intubated patients on mechanical ventilation. 6. Inhalation medication orders will be delivered and/or substituted as outlined below. Aerosolized Medications Ordering and Administration Guidelines:    1. All Medications will be ordered by a physician, and their frequency and/or modality will be adjusted as defined by the patients Respiratory Severity Index (RSI) score. 2. If the patient does not have documented COPD, consider discontinuing anticholinergics when RSI is less than 9.  3. If the bronchospasm worsens (increased RSI), then the bronchodilator frequency can be increased to a maximum of every 4 hours. If greater than every 4 hours is required, the physician will be contacted. 4. If the bronchospasm improves, the frequency of the bronchodilator can be decreased, based on the patient's RSI, but not less than home treatment regimen frequency. 5. Bronchodilator(s) will be discontinued if patient has a RSI less than 9 and has received no scheduled or as needed treatment for 72  Hrs. Patients Ordered on a Mucolytic Agent:    1. Must always be administered with a bronchodilator.     2. Discontinue if patient experiences worsened bronchospasm, or secretions have

## 2019-05-24 NOTE — PROGRESS NOTES
Speech Language Pathology  SPEECH NOTE: Therapy hold, patient is showing reduction to arousal in the presence of vocal and tactile stimulation. No charges. Time 612 am.   Benjamín Sherman CCC-SLP. D B[employer]-S  5-24-19

## 2019-05-24 NOTE — PLAN OF CARE
Ability to maintain a body temperature in the normal range will improve  Description  Ability to maintain a body temperature in the normal range will improve  Outcome: Ongoing     Problem: OXYGENATION/RESPIRATORY FUNCTION  Goal: Patient will maintain patent airway  5/24/2019 0211 by Ann Gonzalez RN  Outcome: Ongoing  5/23/2019 1636 by Sigifredo Panchal RN  Outcome: Ongoing  Goal: Patient will achieve/maintain normal respiratory rate/effort  Description  Respiratory rate and effort will be within normal limits for the patient  Outcome: Ongoing     Problem: HEMODYNAMIC STATUS  Goal: Patient has stable vital signs and fluid balance  Outcome: Ongoing     Problem: FLUID AND ELECTROLYTE IMBALANCE  Goal: Fluid and electrolyte balance are achieved/maintained  Outcome: Ongoing     Problem: ACTIVITY INTOLERANCE/IMPAIRED MOBILITY  Goal: Mobility/activity is maintained at optimum level for patient  5/24/2019 0211 by Ann Gonzalez RN  Outcome: Ongoing  5/23/2019 1636 by Sigifredo Panchal RN  Outcome: Ongoing     Problem: Restraint Use - Nonviolent/Non-Self-Destructive Behavior:  Goal: Absence of restraint indications  Description  Absence of restraint indications  Outcome: Ongoing  Goal: Absence of restraint-related injury  Description  Absence of restraint-related injury  Outcome: Ongoing

## 2019-05-24 NOTE — PROGRESS NOTES
Shift assessment complete; see flow sheet. Scheduled medications administered; See MAR. Pt denies any needs at this time. Call light within reach, bed in low locked position, bed alarm on. Pt alert and orientated to self. Pt aggressive with staff. Sukhi belt initiated.  Tele sitter in place for pt safety

## 2019-05-24 NOTE — PROGRESS NOTES
Occupational Therapy Daily Treatment Note    Unit: [facility]  Date:  5/24/2019  Patient Name:    Rishi Chinchilla  Admitting diagnosis:  HCAP (healthcare-associated pneumonia) [J18.9]  Admit Date:  5/17/2019  Precautions/Restrictions:fall risk, IV, bed/chair alarm, confusion, telemetry and telesitter; Very [place] R>L     Pt cleared for therapy per RN; noted LUE surgical site very painful today and to avoid pulling with LUE      Discharge Recommendations: SNF  DME needs for discharge: defer to facility       Therapy recommendations for staff:   Assist of 2 with use of STEDY for all transfers to/from chair    AM-PAC Score: [address] [place] S4 Level: NA       Treatment Time: 8556-3850  Treatment number:  4    Total Treatment Time:   22 minutes      Subjective: Pt sleeping upon therapists arrival, awakens easily and agreeable to therapy    Pain   Yes  Rating: moderate  Location:Not stated, pt grimacing during movements  Pain Medicine Status: Received pain med prior to tx      Bed Mobility:   Supine to Sit:  Max A of 2  Sit to Supine:  Max A of 2  Rolling:           Not Tested  Scooting: Max A of 2    Transfer Training:   Sit to stand:   Attempted x 1 in STEDY with bed height raised, Pt unable to stand and declined further attempts  Stand to sit:  Not Tested  Bed to Chair:  Not Tested  Bed to [facility]:   Not Tested  Standard toilet:   Not Tested    Activity Tolerance   Pt sat EOB x 10 min for ADLs with no more than CGA to remain upright   Pt completed therapy session with No adverse symptoms      ADL Training:   Upper body dressing:  Min A  Upper body bathing:  Min A  Lower body dressing:  Max A  Lower body bathing: Mod A  Toileting:   Not Tested  Grooming/Hygiene:  Not Tested    Therapeutic Exercise:   N/A     Patient Education:   Role of OT, ADL retraining    Positioning Needs: In bed, call light and needs in reach.     Alarm Set    Family Present:  No    Assessment: Pt with fair participation today, unable to tolerate further attempts to stand, but did participate in ADLs seated EOB    GOALS-continue all  To be met in 3 Visits:  1). Bed to toilet/BSC: Mod A via stand pivot      To be met in 5 Visits:  1). Supine to Sit: Supervision  2). Upper Body Bathing:  Min A  3). Lower Body Bathing:  Mod A  4). Upper Body Dressing: Min A  5). Lower Body Dressing: Mod A  6).  Pt to ese UE exs x 15 reps          Plan: cont with POC      Dorie Olson OTR/L  Lic # [id number]        If patient discharges from this facility prior to next visit, this note will serve as the Discharge Summary

## 2019-05-24 NOTE — PROGRESS NOTES
RUL PNA in an ECF patient - organism unspecified. On Vanc and cefepime 7/7.d/c cefpeime      Sepsis POA due to above   LA normal  Cultures to follow.       Afib Chronic Persistent. Has been off eliquis for the past several years - stopped due to recurrent falls. On ASA.     CAD - cont PTA regimen. ASA  Resume pravachol. Reassess for BB and ACEI once sepsis controlled.       DMII - diet controlled   Hypoglycemia 51 at [facility] - now resolved. Stop D5 infusion. Start sliding scale insulin. Add carb control diet. Add lantus and increase ISS to med dose due to rebound hyperglycemia.       Mod Pulm HTN with chronic and extensive bilateral leg edema and venous stasis dermatitis. Consider diuretic once sepsis controlled.       CKDIV - stable - monitor Cr  Baseline Cr 1.7-1.8    Acute Presumed metabolic Encephalopathy -   CT head negative. Requiring restraints today. Off restraints. More alert today. Still disoriented    Check ammonia     DVT Prophylaxis: lovenox 30  Diet: DIET GENERAL; Carb Control: 5 carb choices (75 gms)/meal  Code Status: Full Code        Dispo - inpatient.       ot/pt  Needs SNF  RAMON Jacobo M.D.

## 2019-05-24 NOTE — PROGRESS NOTES
Inpatient Physical Therapy Daily Treatment Note    Unit: 2w  Date:  5/24/2019  Patient Name:    Nehemias Aquino  Admitting diagnosis:  HCAP (healthcare-associated pneumonia) [J18.9]  Admit Date:  5/17/2019  Precautions/Restrictions:  fall risk, IV, bed/chair alarm, confusion and telesitter, [place], agitated and combative (on 5/21; not seen today)       Discharge Recommendations: SNF  DME needs for discharge: defer to facility       Therapy recommendations for staff:   Assist of 2 with use of STEDY for all transfers to/from chair if pt cooperative. Maxi-lift if not. Home Health S4 Level Recommendation: NA  AM-PAC Mobility Score      AM-PAC Inpatient Mobility without Stair Climbing Raw Score : 8    Treatment Time: 9803-6196  Treatment number: 4  Timed Code Treatment Minutes: 18 minutes  Total Treatment Minutes:  18 minutes    Cognition    A&O x0, but did respond to his name. Able to follow one step commands intermittently (~50% of the time)     Subjective  Patient lying supine in bed with no family present  Pt agreeable to this PT tx. Pain   No  Rating:  NA/10  Location:   Pain Medicine Status: Denies need     Bed Mobility   Supine to Sit:   Max A of 2  Sit to Supine:  Max A of 2  Rolling:   Not Tested  Scooting: Max A of 2 to HOB, max A x1 at EOB    Transfer Training     Sit to stand:   N/A ; attempted x1 to [facility], but pt too sore to fully participate and therefore unable to lift up off of bed; he then declined additional attempts   Stand to sit:   Not Tested    Bed to Chair:  Not Tested with use of N/A    Gait Training gait deferred due to difficulty with transfers  Distance:   ft  Deviations (firm surface/linoleum): N/A   Assistive Device Used:  N/A  Level of Assist: N/A  Comment:     Stair Training deferred, pt unsafe/not appropriate to complete stairs at this time  Pt ascended/descended  stairs with N/A with N/A, and use of N/A.   Pattern: N/A    Therapeutic Exercise [place] not completed this date    Ankle pumps:  Quad sets:   Glut sets:   Heel slides:   SLR:   Hip abd/add:   LAQ:   Marching:     Balance  Static Sitting:  Fair +   Tolerance: sat EOB approx. 10 min with CGA (initially mod/min A, but progressed within 1-2 min)   Dynamic Sitting:  Fair   Static Standing: Not tested   Tolerance:    Dynamic Standing: Not tested    Patient Education      Role of PT, POC, safety awareness and transfer techniques. Positioning Needs       Pt returned to bed, call light and needs in reach and alarm set. ROM Measurements N/A  Knee Flexion:  Knee Extension:     Activity Tolerance   Pt completed therapy session with Pain with attempts to stand. SpO2:   HR:  BP:     Other  None. Assessment :  Patient demonstrating fair participation this afternoon, limited by drowsiness & pain. Pt did complete bed mobility, but required up to max A x2. Continue to recommend SNF at [place]. Goals (all goals ongoing unless otherwise indicated)  To be met in 3 visits:  1). Patient able to stand with STEDY MOD A of 2. - Met 5/22     To be met in 6 visits:  1). Supine to/from sit CGA of 1.  2). Sit to/from stand MOD A of 1.  3). Bed to chair MOD A of 1 via STEDY. 4). Patient able to  STEDY x2 minutes CGA. 5). Tolerate B LE exercises 10 reps. Plan   Continue with plan of care. Edelmira Alonso, PT, DPT #[id number]     If patient discharges from this facility prior to next visit, this note will serve as the Discharge Summary.

## 2019-05-24 NOTE — PROGRESS NOTES
Called updated Dipti DEAN r/t bleeding following her changing redressing today. Received T/O to repack incision with 4x4 instead of aquacel AG, reinforced dressing. Jose Ramon Heath RN updated. Dipti PAcalled back and spoke directly with RN.

## 2019-05-24 NOTE — PROGRESS NOTES
General Surgery - [place], [place]  Daily Progress Note    Pt Name: Santhosh Walkre Record Number: [id number]  Date of Birth 6/14/1931   Today's Date: 5/24/2019    ASSESSMENT  1. POD #1 S/P complex I&D of the right axillary abscess  2. Dressing changed: no odor, no purulence, + bleeding noted with dressing change: packed with Aquacel AG and covered with gauze. 3. Pt more alert today but, remains combative during his dressing change  4. Cr improved  5. VSS  6. Wound culture  +MRSA  7. RN called with bleeding and clot formation size of \"50 cent piece\"     PLAN  1. Can change packing and pack tightly with gauze and apply pressure dressing  2. Change dressing BID and PRN fro excessive drainage  3. ON Vanc for MRSA  4. Will check wound today before I leave     Minor Vu has slightly improved from yesterday. Pain is somewhat well controlled. He has no nausea and no vomiting. He has passed flatus and has had a bowel movement. He is tolerating regular diet. Current activity is up with assistance    OBJECTIVE  VITALS:  height is 6' 1\" (1.854 m) and weight is 224 lb 12.8 oz (102 kg). His axillary temperature is 98.9 °F (37.2 °C). His blood pressure is 142/70 (abnormal) and his pulse is 66. His respiration is 16 and oxygen saturation is 98%. VITALS:  BP (!) 142/70   Pulse 66   Temp 98.9 °F (37.2 °C) (Axillary)   Resp 16   Ht 6' 1\" (1.854 m)   Wt 224 lb 12.8 oz (102 kg)   SpO2 98%   BMI 29.66 kg/m²   INTAKE/OUTPUT:    Intake/Output Summary (Last 24 hours) at 5/24/2019 1432  Last data filed at 5/24/2019 1259  Gross per 24 hour   Intake 140 ml   Output --   Net 140 ml     GENERAL: alert, no distress, uncooperative  I/O last 3 completed shifts: In: 5 [P.O.:120;  I.V.:300]  Out: 400 [Urine:400]  I/O this shift:  In: 20 [P.O.:20]  Out: -     LABS  Recent Labs     05/22/19  0523 05/24/19  0632   WBC 7.8  --    HGB 12.9*  --    HCT 38.8*  --    *  --    * 136   K 3.7 3.8    103 CO2 19* 20*   BUN 18 17   CREATININE 1.6* 1.5*   CALCIUM 9.1 9.1     CBC with Differential:    Lab Results   Component Value Date    WBC 7.8 05/22/2019    RBC 4.27 05/22/2019    HGB 12.9 05/22/2019    HCT 38.8 05/22/2019     05/22/2019    MCV 90.7 05/22/2019    MCH 30.2 05/22/2019    MCHC 33.3 05/22/2019    RDW 14.8 05/22/2019    SEGSPCT 68.8 05/22/2010    LYMPHOPCT 14.2 05/21/2019    MONOPCT 11.0 05/21/2019    EOSPCT 0.2 05/22/2010    BASOPCT 0.5 05/21/2019    MONOSABS 1.0 05/21/2019    LYMPHSABS 1.3 05/21/2019    EOSABS 0.3 05/21/2019    BASOSABS 0.0 05/21/2019    DIFFTYPE Auto 05/22/2010     CMP:    Lab Results   Component Value Date     05/24/2019    K 3.8 05/24/2019    K 4.1 05/18/2019     05/24/2019    CO2 20 05/24/2019    BUN 17 05/24/2019    CREATININE 1.5 05/24/2019    GFRAA 53 05/24/2019    GFRAA >60 05/22/2010    AGRATIO 1.2 05/18/2019    LABGLOM 44 05/24/2019    GLUCOSE 132 05/24/2019    PROT 6.9 05/18/2019    PROT 7.2 05/22/2010    LABALBU 3.7 05/19/2019    CALCIUM 9.1 05/24/2019    BILITOT 0.9 05/18/2019    ALKPHOS 109 05/18/2019    AST 18 05/18/2019    ALT 12 05/18/2019         Dipti Sharon [employer]  Electronically signed 5/24/2019 at 2:22 PM

## 2019-05-24 NOTE — PROGRESS NOTES
Nutrition Assessment    Type and Reason for Visit: Initial(LOS x 7)    Nutrition Recommendations:   1. Continue PO diet per SLP recommendations   2. Added Ensure Compact to each meal    Nutrition Assessment:   Pt. moderately malnourished AEB significant weight loss of 6% x 1 week and intake < 75% for 7 days. At risk for further nutrition compromise r/t altered mental status and reduced po intakes . Will begin ONS with all meals. Malnutrition Assessment:  · Malnutrition Status: Meets the criteria for moderate malnutrition  · Context: Acute illness or injury  · Findings of the 6 clinical characteristics of malnutrition (Minimum of 2 out of 6 clinical characteristics is required to make the diagnosis of moderate or severe Protein Calorie Malnutrition based on AND/ASPEN Guidelines):  1. Energy Intake-Less than or equal to 75% of estimated energy requirement, Greater than or equal to 7 days    2. Weight Loss-5% loss or greater, in 1 week  3. Fat Loss-No significant subcutaneous fat loss, Orbital  4. Muscle Loss-No significant muscle mass loss, Temples (temporalis muscle), Clavicles (pectoralis and deltoids), Calf (gastrocnemius)  5. Fluid Accumulation-No significant fluid accumulation, Extremities  6.  Strength-Not measured    Nutrition Risk Level:  Moderate    Nutrient Needs:  · Estimated Daily Total Kcal: 0547-5415 based 15-18 kcal/kg CBW  · Estimated Daily Protein (g): 101-117 based 1.2-1.4 gr/kg IBW  · Estimated Daily Total Fluid (ml/day): 7593-5266     Nutrition Diagnosis:   · Problem: Inadequate oral intake  · Etiology: related to Cognitive or neurological impairment, Insufficient energy/nutrient consumption, Difficulty swallowing     Signs and symptoms:  as evidenced by Intake 25-50%, Weight loss 1-2% in 1 week, Presence of wounds    Objective Information:  · Nutrition-Focused Physical Findings: pt asleep non arousable to touch; nurse was unable to administer meds d/t unalertness; unshaven;  large overweight elderly male;  ankles thin; skin on lower legs rough and tight; feet scaley   · Wound Type: Open Wounds  · Current Nutrition Therapies:  · Oral Diet Orders: General, Dental Soft, No Straws   · Oral Diet intake: 26-50%  · Oral Nutrition Supplement (ONS) Orders: Standard High Calorie Oral Supplement  · ONS intake:    · Anthropometric Measures:  · Ht: 6' 1\" (185.4 cm)   · Current Body Wt: 224 lb (101.6 kg)  · Admission Body Wt: 239 lb (108.4 kg)  · Usual Body Wt: (unknown )  · % Weight Change:  ,  6.2 % loss x 1 week   · Ideal Body Wt: 184 lb (83.5 kg), % Ideal Body 123  · Adjusted Body Wt: (NA), body weight adjusted for (NA)  · BMI Classification: BMI 25.0 - 29.9 Overweight    Nutrition Interventions:   Continue current diet, Start ONS  Continued Inpatient Monitoring, Coordination of Care, Coordination of Community Care    Nutrition Evaluation:   · Evaluation: Goals set   · Goals: Pt will increase his intake of nutrition by consuming  > 50% of meals and supplement and his body weight will remains > 220#     · Monitoring: Meal Intake, Supplement Intake, Wound Healing, Weight, Mental Status/Confusion      Electronically signed by Angel Chowdhury RD, LD on 5/24/19 at 12:17 PM    Contact Number: [phone]

## 2019-05-24 NOTE — PLAN OF CARE
Nutrition Problem: Inadequate oral intake  Intervention: Food and/or Nutrient Delivery: Continue current diet, Start ONS  Nutritional Goals: Pt will increase his intake of nutrition by consuming  > 50% of meals and supplement and his body weight will remains > 220#

## 2019-05-24 NOTE — PROGRESS NOTES
Handoff report and transfer of care given at bedside to [facility]. Patient in stable condition, denies needs/concerns at this time. Call light within reach.

## 2019-05-24 NOTE — PROGRESS NOTES
Spoke with [employer], CNP r/t bleeding from incision to R axillary, blankets and sheet saturated with blood around upper body, large clot noted, repacked and reinforced per CNP instruction. Patient tolerated well, alert to self and responsive to writer. Denies further needs at this time.

## 2019-05-24 NOTE — PROGRESS NOTES
Pt refusing to take any medications, pulling at lines, hitting staff, 24 hour order for posey belt initiated.

## 2019-05-25 NOTE — PROGRESS NOTES
Occupational Therapy/Physical Therapy    Unit:  2West   Date:  5/25/2019  Patient Name:    Valencia Her  Admitting diagnosis:  HCAP (healthcare-associated pneumonia) [J18.9]  Admit Date:  5/17/2019    Attempt @ 2074: Decline. Upon entry, pt would not initially keep his eyes open. Therapy attempting to engage pt without success. Pt continues to wear L wrist brace. Pt declined to let therapist remove brace to assess skin integrity and ROM. Per Dr. Alexandra Ocasio, stiffness is the major concern at this point. RN notified. Pt continuing to decline participation despite encouragement and redirection. Pt keeping L eye closed throughout. 15 minutes spent in pt room. No charge. Will f/u as time allows and is appropriate. Thank you. Edna Molina, MOT, OTR/L   [id number]  Kellie Hutton, PT #[id number]      Copied from OT evaluation on 5/19/19. Per OT evaluation on 3/18/19, pt was NWB LUE with L wrist splint. Per Dr. Alexandra Ocasio, 5/6/19: Crow Kaur discussed diagnosis and I would recommend further conservative measures.  I anticipate that his wrist pain should continue to improve and likely go away altogether, this may take another 3-6 months.  I would like to order hand therapy at his care facility for range of motion of the wrist and hand.  The hand stiffness is of most concern at this point. We did provide a removable brace which can be worn when the wrist is bothersome.  This does not need to be worn at this stage as the fracture appears healed. \"

## 2019-05-25 NOTE — PROGRESS NOTES
edema with chronic venous stasis dermatitis. Neurologic: disoriented,awake         Labs:   No results for input(s): WBC, HGB, HCT, PLT in the last 72 hours. Recent Labs     05/24/19  0632      K 3.8      CO2 20*   BUN 17   CREATININE 1.5*   CALCIUM 9.1     No results for input(s): AST, ALT, BILIDIR, BILITOT, ALKPHOS in the last 72 hours. No results for input(s): INR in the last 72 hours. No results for input(s): Yashira Fani in the last 72 hours. Urinalysis:      Lab Results   Component Value Date    NITRU Negative 05/17/2019    WBCUA 0-2 05/17/2019    BACTERIA 1+ 07/10/2017    RBCUA 0-2 05/17/2019    BLOODU Negative 05/17/2019    SPECGRAV 1.010 05/17/2019    GLUCOSEU Negative 05/17/2019    GLUCOSEU 250 mg/dL 05/22/2010       Radiology:  CT HEAD WO CONTRAST   Final Result   1. No acute intracranial abnormality. CT CHEST WO CONTRAST   Final Result   No axillary lesion is seen by CT. Small pleural effusions. XR CHEST STANDARD (2 VW)   Final Result   Cardiomegaly and mild bilateral congestion with left basilar infiltrate   representing atelectasis versus pneumonia. XR CHEST PORTABLE   Final Result   Question right mid to upper lung airspace disease, atelectasis or pneumonia. Follow-up to resolution is recommended. Assessment/Plan:    Active Hospital Problems    Diagnosis    Moderate protein-calorie malnutrition ([facility] [address] [E44.0]    Abscess of axilla, right [L02.411]    Mass of right axilla [R22.31]    Pneumonia due to organism [L24.1]    Metabolic encephalopathy [Y90.75]    HCAP (healthcare-associated pneumonia) [J18.9]       R axillary Abscess  Received Iv Vanc and cefepime 7/7. Continue vancomycin  Surgery eval for I&D - s/p bedside I&D - cont wound care. MRSA in culture. repeat I&D in OR 5/23.       [address] associated pneumonia   - probable gram negative, risk for methicillin resistant Staph aureus as well   Possible RML and RUL PNA in an ECF patient - organism unspecified. On Vanc and cefepime 7/7.d/c cefpeime. Continue vanc      Sepsis POA due to above   LA normal  Cultures to follow.       Afib Chronic Persistent. Has been off eliquis for the past several years - stopped due to recurrent falls. On ASA.     CAD - cont PTA regimen. ASA  Resume pravachol. Reassess for BB and ACEI once sepsis controlled.       DMII   With recurrent hypoglycemia . -S/P D5 infusion.   - Decreased dose of Lantus insulin , continue  sliding scale insulin. Mod Pulm HTN with chronic and extensive bilateral leg edema and venous stasis dermatitis. Consider diuretic once sepsis controlled.       CKD- stage IV   - stable - monitor Cr. Creatinine 1.5 today  Baseline Cr 1.7-1.8    Acute  metabolic Encephalopathy   -  CT head negative. - Status post hypoglycemia  - Off restraints now , monitor blood sugars .    -  Ammonia levels were normal    Dispo   - ot/pt  Needs SNF     DVT Prophylaxis: lovenox 30  Diet: DIET GENERAL; Carb Control: 5 carb choices (75 gms)/meal  Code Status: Full Code        Vish Linares MD  5/25/2019

## 2019-05-25 NOTE — PROGRESS NOTES
[facility] GENERAL SURGERY    Surgery Progress Note           POD # 2    PATIENT NAME: Francois Barajas     TODAY'S DATE: 5/25/2019    INTERVAL HISTORY:    Pt confused. OBJECTIVE:   VITALS:  BP (!) 142/76   Pulse 72   Temp 98.2 °F (36.8 °C) (Axillary)   Resp 18   Ht 6' 1\" (1.854 m)   Wt 224 lb 12.8 oz (102 kg)   SpO2 98%   BMI 29.66 kg/m²     INTAKE/OUTPUT:    I/O last 3 completed shifts: In: [de-identified] [P.O.:80]  Out: -   I/O this shift:  In: 61 [P.O.:60]  Out: -               R axilla - no erythema, less purulence, no bleeding    Data:  CBC: No results for input(s): WBC, HGB, HCT, PLT in the last 72 hours. BMP:    Recent Labs     05/24/19  0632      K 3.8      CO2 20*   BUN 17   CREATININE 1.5*   GLUCOSE 132*     Hepatic: No results for input(s): AST, ALT, ALB, BILITOT, ALKPHOS in the last 72 hours. Mag:    No results for input(s): MG in the last 72 hours. Phos:   No results for input(s): PHOS in the last 72 hours. INR: No results for input(s): INR in the last 72 hours. Radiology Review:  NA    ASSESSMENT AND PLAN:  80 y.o. male status post right axillary I&D  1. Continue local wound care  2.   Continue abx         Electronically signed by Herberth Reardon MD

## 2019-05-25 NOTE — PROGRESS NOTES
Recheck on fsbs, still 62. Will give IM glucagon and recheck. Pt refusing to drink or eat and refusing IV starting to get combative.

## 2019-05-25 NOTE — PROGRESS NOTES
FSBS 68 patient alert and verbal but refusing PO intake at this time. Dextrose IV 50% solution given per MD order.

## 2019-05-25 NOTE — PROGRESS NOTES
BSFS 57. Pt wakes up but goes right back to sleep. Pt is responding to questions. Pt  IV was pulled out by pt, oral glucose gel given. Will recheck sugar.

## 2019-05-26 NOTE — PROGRESS NOTES
[facility] GENERAL SURGERY    Surgery Progress Note           POD # 3    PATIENT NAME: Janett Baugh     TODAY'S DATE: 5/26/2019    INTERVAL HISTORY:    Pt with some improvement in confusion. OBJECTIVE:   VITALS:  /68   Pulse 60   Temp 97.8 °F (36.6 °C) (Oral)   Resp 16   Ht 6' 1\" (1.854 m)   Wt 224 lb 12.8 oz (102 kg)   SpO2 95%   BMI 29.66 kg/m²     INTAKE/OUTPUT:    I/O last 3 completed shifts: In: 120 [P.O.:120]  Out: 700 [Urine:700]  I/O this shift:  In: 60 [P.O.:60]  Out: 280 [Urine:280]              CONSTITUTIONAL:  awake and alert  R axilla - no erythema, less tender, mild purulence    Data:  CBC: No results for input(s): WBC, HGB, HCT, PLT in the last 72 hours. BMP:    Recent Labs     05/24/19  0632 05/26/19  0556    135*   K 3.8 3.7    103   CO2 20* 21   BUN 17 19   CREATININE 1.5* 1.5*   GLUCOSE 132* 188*     Hepatic: No results for input(s): AST, ALT, ALB, BILITOT, ALKPHOS in the last 72 hours. Mag:    No results for input(s): MG in the last 72 hours. Phos:   No results for input(s): PHOS in the last 72 hours. INR: No results for input(s): INR in the last 72 hours. Radiology Review:  NA    ASSESSMENT AND PLAN:  80 y.o. male status post right axilla I&D  1. Dressing changed  2.   Continue abx         Electronically signed by Dayan Prince MD

## 2019-05-26 NOTE — PROGRESS NOTES
Shift assessment complete. See flow sheet. Pt is drowsy will wake up to answer but goes right back to sleep. Pt refusing medication. Vital signs stable. Pt denies any present needs/concerns. Bed check on, tele sitter in place. Call light explained and in reach. Will continue to monitor.

## 2019-05-26 NOTE — DISCHARGE SUMMARY
Physician Discharge Summary     Patient ID:  Loni Gomez  [id number]  73 y.o.  6/14/1931    Admit date: 5/17/2019    Discharge date and time: 5/26/2019  8:00 PM     Admitting Physician: Stephen Tobin MD     Discharge Physician: Stephen Tobin    Admission Diagnoses: HCAP (healthcare-associated pneumonia) [J18.9]    Discharge Diagnoses: Active Problems:    HCAP (healthcare-associated pneumonia)    Pneumonia due to organism    Metabolic encephalopathy    Mass of right axilla    Abscess of axilla, right    Moderate protein-calorie malnutrition ([address]    Hypoglycemia  Resolved Problems:    * No resolved hospital problems. *      Admission Condition: fair    Discharged Condition: stable    Indication for Admission:   Per HPI    The patient is a 80 y.o. male long term ECF resident, w hx of diet controlled DMII, mod pulm HTN, chronic leg edema, CAD, Afib chronic persistent, not on AC due to Hx of recurrent falls, who was sent from [facility] to the ED with complaints of low BG and AMS.    Pt not eating well yesterday. Has been refusing to interact with staff or let them examine him. Confused and disoriented. His BG was 51 reported at [facility]. So they called his caregivers and advised then that he will be going to the ED.      In the last few days he was treated for R axillary swelling and pain.      Pt also reports \"phlegm\" at the roof of his mouth.      He denies any chest pain, no cough, no SOB.           Hospital Course:     R axillary Abscess  Surgery eval for I&D - s/p bedside I&D - cont wound care. MRSA in culture. repeat I&D in OR 5/23. Treated with IV antibiotics vancomycin and cefepime, has completed course.       HCAP - Healthcare associated pneumonia   - probable gram negative, risk for methicillin resistant Staph aureus as well   Possible RML and RUL PNA in an ECF patient - organism unspecified.   - Treated with Vanc and cefepime 7/7.      Sepsis   POA due to above   Resolved now      Afib Chronic Persistent. Has been off eliquis for the past several years - stopped due to recurrent falls. On ASA.     CAD   -Stable  On ASA, Statins      DMII   With recurrent hypoglycemia . -S/P D5 infusion.   - Decreased dose of Lantus insulin , continue  sliding scale insulin. Blood sugar stable now     Mod Pulm HTN with chronic and extensive bilateral leg edema and venous stasis dermatitis.         CKD- stage IV   - stable - monitor Cr. Creatinine 1.5 today  Baseline Cr 1.7-1. 8     Acute  metabolic Encephalopathy   -  CT head negative. - Status post hypoglycemia  - Off restraints now , monitor blood sugars . -  Ammonia levels were normal          DVT Prophylaxis: lovenox 30  Diet: DIET GENERAL; Carb Control: 5 carb choices (75 gms)/meal  Code Status: Full Code      Consults: general surgery      Significant Diagnostic Studies:   Data:  CBC:  Recent Labs     05/22/19  0523   WBC 7.8   HGB 12.9*   HCT 38.8*   *       BMP:   Recent Labs     05/24/19  0632 05/26/19  0556    135*   K 3.8 3.7    103   CO2 20* 21   BUN 17 19   CREATININE 1.5* 1.5*     FASTING LIPID PANEL:  Lab Results   Component Value Date    CHOL 169 02/07/2018    HDL 55 02/07/2018    TRIG 118 02/07/2018        Radiology:  CT HEAD WO CONTRAST   Final Result   1. No acute intracranial abnormality.           CT CHEST WO CONTRAST   Final Result   No axillary lesion is seen by CT.       Small pleural effusions.           XR CHEST STANDARD (2 VW)   Final Result   Cardiomegaly and mild bilateral congestion with left basilar infiltrate   representing atelectasis versus pneumonia.           XR CHEST PORTABLE   Final Result   Question right mid to upper lung airspace disease, atelectasis or pneumonia. Follow-up to resolution is recommended.                  Treatments: as above        Discharge Exam:  Blood pressure 126/68, pulse 60, temperature 97.8 °F (36.6 °C), temperature source Oral, resp.  rate 16, height 6' 1\" (1.854 m), weight 224 lb 12.8 oz (102 kg), SpO2 95 %. General appearance: No apparent distress . awake  and responds well. oriented to person   HEENT yellowish shallow ulcers roof of mouth.   Neck: Supple, No jugular venous distention/bruits.  Trachea midline without thyromegaly or adenopathy with full range of motion. Lungs: Clear to auscultation, bilaterally without Rales/Wheezes/Rhonchi with good respiratory effort. Heart: irregularly irregular. No murmurs.   Abdomen: Soft, non-tender or non-distended without rigidity or guarding and positive bowel sounds all four quadrants.   Extremities: 1+ pitting edema with chronic venous stasis dermatitis.   Neurologic:  Awake , oriented X1       Disposition: [facility]    Patient Instructions:      Medication List      START taking these medications    aspirin 81 MG chewable tablet  Take 1 tablet by mouth daily  Start taking on:  5/27/2019     insulin glargine 100 UNIT/ML injection vial  Commonly known as:  LANTUS  Inject 10 Units into the skin nightly        CHANGE how you take these medications    hydrOXYzine 10 MG tablet  Commonly known as:  ATARAX  Take 1 tablet by mouth every 8 hours as needed for Anxiety  What changed:    · how much to take  · when to take this  · reasons to take this        CONTINUE taking these medications    gabapentin 100 MG capsule  Commonly known as:  NEURONTIN     insulin lispro 100 UNIT/ML injection vial  Commonly known as:  HUMALOG     magnesium hydroxide 400 MG/5ML suspension  Commonly known as:  MILK OF MAGNESIA     nitroGLYCERIN 0.4 MG SL tablet  Commonly known as:  NITROSTAT     pantoprazole 40 MG tablet  Commonly known as:  PROTONIX     rosuvastatin 5 MG tablet  Commonly known as:  CRESTOR     tamsulosin 0.4 MG capsule  Commonly known as:  FLOMAX        STOP taking these medications    acetaminophen 325 MG tablet  Commonly known as:  TYLENOL     betamethasone dipropionate 0.05 % cream  Commonly known as:  DIPROLENE     doxycycline hyclate 100 MG capsule  Commonly known as:   VIBRAMYCIN     insulin aspart protamine-insulin aspart (70-30) 100 UNIT/ML injection  Commonly known as:  NOVOLOG MIX 70/30     omeprazole 20 MG delayed release capsule  Commonly known as:  PRILOSEC     pravastatin 80 MG tablet  Commonly known as:  PRAVACHOL     traMADol 50 MG tablet  Commonly known as:  ULTRAM           Where to Get Your Medications      Information about where to get these medications is not yet available    Ask your nurse or doctor about these medications  · aspirin 81 MG chewable tablet  · hydrOXYzine 10 MG tablet  · insulin glargine 100 UNIT/ML injection vial       Activity: activity as tolerated  Diet: diabetic diet  Wound Care: as directed    Follow-up with SHAKIRA DUGAN      Signed:  David Martinez  5/26/2019  4:11 PM

## 2019-05-26 NOTE — PLAN OF CARE
Problem: Falls - Risk of:  Goal: Will remain free from falls  Description  Will remain free from falls  5/25/2019 2009 by Vidhi Garvin RN  Outcome: Ongoing     Problem: Risk for Impaired Skin Integrity  Goal: Tissue integrity - skin and mucous membranes  Description  Structural intactness and normal physiological function of skin and  mucous membranes.   5/25/2019 2009 by Vidhi Garvin RN  Outcome: Ongoing     Problem: Airway Clearance - Ineffective:  Goal: Clear lung sounds  Description  Clear lung sounds  Outcome: Ongoing     Problem: OXYGENATION/RESPIRATORY FUNCTION  Goal: Patient will maintain patent airway  Outcome: Ongoing

## 2019-05-28 NOTE — CARE COORDINATION
DISCHARGE ORDER  Date/Time 2019 4:44 PM  Completed by: Eva Davis, Case Management    Patient Name: Liza Sanderson    : 1931  Admitting Diagnosis: HCAP (healthcare-associated pneumonia) [J18.9]  Admit Date/Time: 2019  2:55 PM    Noted discharge order. Confirmed discharge plan with patient / family (pt, Rosalva Nowak): Yes   Discharge Plan: Alonso Quiet    Discharge orders and Continuity of Care faxed to facility: Yes  Hospital Exemption Notification System complete: Yes  Transportation arranged: Yes - [facility] [address] @ 20:00pm.  Patient / Family (ptRosalva) aware of  time: Yes   Nursing aware of  time: Yes  Receiving facility aware of  time: Yes  Pre-cert obtained?   NA
INTERDISCIPLINARY PLAN OF CARE CONFERENCE    Date/Time: 5/20/2019 10:02 AM  Completed by: Teena Quintana Case Management      Patient Name:  Betty Elena  YOB: 1931  Admitting Diagnosis: HCAP (healthcare-associated pneumonia) [J18.9]     Admit Date/Time:  5/17/2019  2:55 PM    Chart reviewed. Interdisciplinary team met to discuss patient progress and discharge plans. Disciplines included Case Management, Nursing, and Dietitian. Current Status:stable    PT/OT recommendation:SNF    Anticipated Discharge Date: tbd  Expected D/C Disposition:  Skilled nursing facility  Confirmed plan with patient and/or family Yes  Discharge Plan Comments: Reviewed chart. Plan continues [facility] at d/c. Pt is a bed hold per Radha,+eCOC. No HENS or pre-cert needed. Follow.           Home O2 in place on admit: to SNF  Pt informed of need to bring portable home O2 tank on day of discharge for nursing to connect prior to leaving:  Not Indicated  Verbalized agreement/Understanding:  Not Indicated
INTERDISCIPLINARY PLAN OF CARE CONFERENCE    Date/Time: 5/22/2019 2:52 PM  Completed by: Delfina Deleon, Case Management      Patient Name:  Reid Escalante  YOB: 1931  Admitting Diagnosis: HCAP (healthcare-associated pneumonia) [J18.9]     Admit Date/Time:  5/17/2019  2:55 PM    Chart reviewed. Interdisciplinary team met to discuss patient progress and discharge plans. Disciplines included Case Management, Nursing, and Dietitian. Current Status:stable; I&D tomorrow AM    PT/OT recommendation:SNF    Anticipated Discharge Date: tbd  Expected D/C Disposition:  Skilled nursing facility  Confirmed plan with patient and/or family Yes-pt  Discharge Plan Comments: Chart reviewed and role of dcp explained. Pt continues with plan to return to [facility]. Pt is a bedhold per [employer]. +eCOC No HENS or precert needed. Following.        Home O2 in place on admit: ecf  Pt informed of need to bring portable home O2 tank on day of discharge for nursing to connect prior to leaving:  Not Indicated  Verbalized agreement/Understanding:  Not Indicated
INTERDISCIPLINARY PLAN OF CARE CONFERENCE    Date/Time: 5/24/2019 2:16 PM  Completed by: Reina oRdríguez, Case Management      Patient Name:  Christiano eMraz  YOB: 1931  Admitting Diagnosis: HCAP (healthcare-associated pneumonia) [J18.9]     Admit Date/Time:  5/17/2019  2:55 PM    Chart reviewed. Interdisciplinary team met to discuss patient progress and discharge plans. Disciplines included Case Management, Nursing, and Dietitian. Current Status:stable    PT/OT recommendation:SNF    Anticipated Discharge Date: tbd  Expected D/C Disposition:  Skilled nursing facility  Confirmed plan with patient and/or family   Discharge Plan Comments: Chart reviewed, plan continues for pt to return to [address] [facility]. Pt is a bedhold per Jason Fowler. +eCOC No HENS or precert needed. Following.      Home O2 in place on admit: ecf  Pt informed of need to bring portable home O2 tank on day of discharge for nursing to connect prior to leaving:  Not Indicated  Verbalized agreement/Understanding:  Not Indicated
INTERDISCIPLINARY PLAN OF CARE CONFERENCE    Date/Time: 5/28/2019 9:27 AM  Completed by: Argelia Bolivar Case Management      Patient Name:  Sylvia Garcia  YOB: 1931  Admitting Diagnosis: HCAP (healthcare-associated pneumonia) [J18.9]     Admit Date/Time:  5/17/2019  2:55 PM    Chart reviewed. Interdisciplinary team met to discuss patient progress and discharge plans. Disciplines included Case Management, Nursing, and Dietitian. Current Status:discharged      Discharge Plan Comments: Reviewed chart. Received call from Alison Rajput with [employer],as she is active with pt. Rangel Turner informed that pt was discharged on 5/26/19 to [facility]. Isabel verbalized understanding.
understanding, as pt is confused. Pt is from [facility] and, per Marcial García, plans to return. CM spoke with Rosalie Dorsey at [facility] ([phone]) and she is able to accept pt back, but will need another PT/OT eval and Medicare stay. No HENS is needed. Orders placed for PT/OT eval. +eCOC. Explained Case Management role/services.

## 2019-08-07 PROBLEM — R00.1 SYMPTOMATIC BRADYCARDIA: Status: ACTIVE | Noted: 2019-01-01

## 2019-08-07 NOTE — LETTER
Beneficiary Notification Letter     This [facility] Provider is Participating in an Innovative Payment and [address] [place] [address] [place] from Medicare     Greetings:   [facility] is participating in a Medicare initiative called the [facility] for [address] [facility]. You are receiving this letter because your health care provider has identified you as a patient who is receiving care through this initiative. Health care providers participating in the [facility] [address] [facility], including [facility], will work with Medicare to improve care for patients. Your Medicare rights have not been changed. You still have all the same Medicare rights and protections, including the right to choose which hospital, doctor, or other health care provider you see. However, because [facility] chose to participate in the [address] [place] [address], all Medicare beneficiaries who meet the eligibility criteria of this initiative will receive care under the initiative. If you do not wish to receive care under the Bundled Payments [facility] [address] [facility], you must choose a health care provider that does not participate in this initiative for your care. Regardless of which health care provider you see, Medicare will continue to cover all of your medically necessary services. Bundled Payments for Care Improvement Advanced aims to help improve your care     The Bundled Payments [facility] [address] [facility] is an innovative Medicare initiative that encourages your doctors, hospitals, and other health care providers to work more closely together so you get better care during and following certain hospital stays.  In this initiative, doctors and hospitals may work closely with certain health care providers and suppliers that help patients recover after discharge from the hospital, · To find a different doctor, visit Medicare's Physician Compare website, HDTapes.co.nz, or call 1-800-MEDICARE (092 0997). TTY users should call 2-541.247.9538. · To find a different skilled nursing facility, visit [facility] Medico website, https://www.[employer]/, or call 1-800-MEDICARE (1- 638.897.5774). TTY users should call 2-276.253.5114. · To find a different long term care hospital, visit [facility] 9[address] Compare website, [employer].be, or call 1-800- MEDICARE (076 6100). TTY users should call 2-628.978.6629. · To find a different inpatient rehabilitation facility, visit [facility] E Compare website, www.medicare.gov/ inpatientrehabilitation facilitycompare, or call 1-800-MEDICARE (6-409.439.9545). TTY users should call 7- 646.950.5682. · To find a different home health agency, visit [address] website, www.medicare.gov/homehealthcompare, or call 1-800-MEDICARE (0-011- 998-6961). TTY users should call 6-595.606.3239.

## 2019-08-07 NOTE — ED NOTES
Pt's HR intermittently dropping into 40's. Life pack in place, in addition to cardiac monitor. MD notified, Atropine 1mg administered per orders, fluids infusing at this time.        Rea Murphy RN  08/07/19 [id number]

## 2019-08-07 NOTE — H&P
conservative management. May need sitter if behavioral issues return    Chronic diastolic heart failure  - appears euvolemic  - last echo 4/17 with EF 55%  - holding home ACE, diuretic  - cardiology following    CAD with mild troponin elevation  - no concern for ACS at present  - will continue to monitor trop trend. EKG without signs of ischemia  - cardiology following. Defer further cardiac testing. DMII  - well controlled  - continue home lantus with SSI    DVT Prophylaxis: lovenox  Diet: regular diet  Code Status: Full code    PT/OT Eval Status: not ordered    Dispo - at least two days       Magan Lau MD    Thank you Enrique Carreon MD for the opportunity to be involved in this patient's care. If you have any questions or concerns please feel free to contact me at [phone].

## 2019-08-07 NOTE — ED PROVIDER NOTES
DRAINAGE Right 5/23/2019    AXILLARY ABSCESS INCISION AND DRAINAGE performed by Gabe Elizondo MD at [facility] [address] Right 05/23/2019     AXILLARY ABSCESS INCISION AND DRAINAGE (Right )    UPPER GASTROINTESTINAL ENDOSCOPY  4/18/16       Home medications:   Current Discharge Medication List      CONTINUE these medications which have NOT CHANGED    Details   acetaminophen (TYLENOL) 325 MG tablet Take 325 mg by mouth every 4 hours as needed for Pain      mineral oil-hydrophilic petrolatum (AQUAPHOR) ointment Apply topically as needed for Dry Skin Apply topically as needed. furosemide (LASIX) 20 MG tablet furosemide 20 mg tablet      nystatin (NYSTATIN) 893099 UNIT/GM powder Nystop 100,000 unit/gram topical powder      potassium chloride (MICRO-K) 10 MEQ extended release capsule Take 10 mEq by mouth 2 times daily      Probiotic Product (PROBIOTIC ADVANCED PO) Take by mouth      aspirin 81 MG chewable tablet Take 1 tablet by mouth daily  Qty: 30 tablet, Refills: 3      hydrOXYzine (ATARAX) 10 MG tablet Take 1 tablet by mouth every 8 hours as needed for Anxiety      insulin glargine (LANTUS) 100 UNIT/ML injection vial Inject 10 Units into the skin nightly  Qty: 1 vial, Refills: 3      insulin lispro (HUMALOG) 100 UNIT/ML injection vial Inject into the skin 3 times daily (before meals) Sliding scale      pantoprazole (PROTONIX) 40 MG tablet Take 40 mg by mouth daily      rosuvastatin (CRESTOR) 5 MG tablet Take 5 mg by mouth nightly      gabapentin (NEURONTIN) 100 MG capsule Take 300 mg by mouth daily. magnesium hydroxide (MILK OF MAGNESIA) 400 MG/5ML suspension Take 30 mLs by mouth daily as needed for Constipation      nitroGLYCERIN (NITROSTAT) 0.4 MG SL tablet Place 0.4 mg under the tongue every 5 minutes as needed for Chest pain Up to 3 tablets.  If chest pain continues call 911.      tamsulosin (FLOMAX) 0.4 MG capsule Take 0.4 mg by mouth daily       lisinopril (PRINIVIL;ZESTRIL) 5 MG

## 2019-08-07 NOTE — ED NOTES
1530 - PS to hospitalists  Re: symptomatic bradycardia  1542 - Dr Chase Barrientos called back at this time.  Call transferred to Dr Talisha Thacker at this time     Sarah Harmon  08/07/19 3954

## 2019-08-08 NOTE — CONSULTS
Wound 08/07/19 Pretibial Left; Anterior; Lateral scattered and excoritated dry brown scabs through out lower anterior leg. Dry non draining red ulcer left lateral leg x 2. NO need to measure per [address] (Active)   Wound Image   8/8/2019  1:45 PM   Wound Venous 8/8/2019  1:45 PM   Dressing/Treatment Open to air 8/8/2019  1:45 PM   Wound Cleansed Other (Comment) 8/8/2019  1:45 PM   Tunneling Position ___ O'Clock 0 8/8/2019  1:45 PM   Undermining Starts ___ O'Clock 0 8/8/2019  1:45 PM   Undermining Ends___ O'Clock 0 8/8/2019  1:45 PM   Undermining Maxium Distance (cm) 0 8/8/2019  1:45 PM   Wound Assessment Brown;Red;Dry 8/8/2019  1:45 PM   Drainage Amount None 8/8/2019  1:45 PM   Odor None 8/8/2019  1:45 PM   Margins Attached edges; Defined edges 8/8/2019  1:45 PM   Red%Wound Bed 30 8/8/2019  1:45 PM   Other%Wound Bed 70% brown 8/8/2019  1:45 PM   Culture Taken No 8/8/2019  1:45 PM   Number of days: 0     Left lateral Dry light red: Incision 05/23/19 Axilla Right (Active)   Number of days: 77         Response to treatment:  Well tolerated by patient. Pain Assessment:  Severity:  0 / 10  Quality of pain: N/A  Wound Pain Timing/Severity: none  Premedicated: No    Plan   Plan of Care: Wound 08/07/19 Pretibial Right; Anterior; Lower scattered and excoritated dry brown scabs through out lower anterior leg. NO need to measure per CWOCN-Dressing/Treatment: Open to air, Moisturizing cream  Wound 08/07/19 Pretibial Left; Anterior; Lateral scattered and excoritated dry brown scabs through out lower anterior leg. Dry non draining red ulcer left lateral leg x 2. NO need to measure per CWOCN-Dressing/Treatment: Open to air     Dr Chhaya Sharp here at the end of the visit and updated. Recommend:  Clean lower legs with soap and water or bath wipes daily. Apply moisturizing lotion to lower legs and feet daily. Wound care to sign off.   RE-consult for deterioration      Specialty Bed Required : Yes   [] Low Air Loss   [x] Pressure Redistribution  [] Fluid Immersion  [] Bariatric  [] Total Pressure Relief  [] Other:     Current Diet: DIET CARB CONTROL;   Dietician consult:  N/A    Discharge Plan:  Placement for patient upon discharge: intermediate care facility    Patient appropriate for Outpatient [address]: No    Referrals:  []   [] [facility]  [] Supplies  [] Other    Patient/Caregiver Teaching:  Level of patient/caregiver understanding able to:   [] Indicates understanding       [] Needs reinforcement  [] Unsuccessful      [] Verbal Understanding  [] Demonstrated understanding       [] No evidence of learning  [] Refused teaching         [] N/A       Electronically signed by Gretel Mcqueen, RN, MSN, Mariola Lyman on 8/8/2019 at 1:51 PM

## 2019-08-08 NOTE — CONSULTS
[address]                                  CONSULTATION    PATIENT NAME: Jaylene Beaver                      :        1931  MED REC NO:   [id number]                          ROOM:       7968  ACCOUNT NO:   [de-identified]                           ADMIT DATE: 2019  PROVIDER:     Rachel Vo MD    CONSULT DATE:  2019    CARDIAC ELECTROPHYSIOLOGY CONSULTATION    REASON FOR CONSULTATION:  Bradycardia. HISTORY OF PRESENT ILLNESS:  The patient is an 22-year-old man with  history of chronic kidney disease and persistent atrial fibrillation who  was admitted for altered-mental status. He lives in the extended-care  facility and was noted to be more lethargic than usual.  Family members  corroborated this. He was evaluated and found to have heart rates in  the 30s. He was brought to the Emergency Department with initial ECG  demonstrated atrial fibrillation with heart rate of 65 beats per minute. On ECG telemetry monitoring, his heart rate has been between 30 and 40  beats per minute. He was not on any medications at the time of  admission which might have been expected to influence his heart rate and  he was not found to have any laboratory abnormalities or acid base  disturbances which might be expected to contribute to bradycardia. He  is known to have longstanding atrial fibrillation and has had heart  rates in the 50s on previous ECGs. He did receive some atropine in the  Emergency Department which did result in transient increase in the heart  rate. Echocardiography from 2017 demonstrates preserved left  ventricular function with an ejection fraction of 55%. PAST MEDICAL HISTORY:  1. Persistent atrial fibrillation. 2.  Chronic kidney disease. 3.  Diabetes mellitus. 4.  Hypertension. 5.  Hyperlipidemia. 6.  GI bleed.     MEDICATIONS:  At the time of admission include

## 2019-08-08 NOTE — PROGRESS NOTES
Spoke with Dr. Jorge Arceo regarding bradycardia. Ordered STAT EKG.  stated not to give atropine. Will continue to monitor.

## 2019-08-08 NOTE — PROGRESS NOTES
lyssa sent to [facility] NP: patient's HR now frequently dropping to 25-29. will go back up to 30-40. please advise.

## 2019-08-09 PROBLEM — Z95.0 PACEMAKER: Status: ACTIVE | Noted: 2019-01-01

## 2019-08-09 NOTE — PROCEDURES
advanced to the low  right atrium. The dilator and wire were then withdrawn. The introducer  was aspirated and flushed carefully and placed on a continuous infusion  of heparinized saline. The Micra delivery system was then prepared and  introduced through the introducer. This was advanced to the right  atrium and deflected across the tricuspid valve. Sites were evaluated  fluoroscopically. The site was identified on the mid right ventricular  septum and the device was deployed at that site. Using the tug test, at  least 2 times were observed to deflect to response in tension. Evaluation of the device at that site demonstrated R-wave sensing at 12  millivolts. The pacing impedance was 1530 ohms. Pacing threshold is  0.38 volts at 0.24 milliseconds. After 5 minutes of relapse, the  sensing and pacing threshold as well as pacing impedance had not  changed. One of the tethers was cut and the tether was removed under  fluoroscopic guidance. The device did not change and reevaluation of  sensing and pacing threshold was normal.  A pursestring suture was then  placed around the delivery sheath. The delivery sheath was then removed  and firm manual pressure was applied for 5 minutes. The pursestring  suture was then pulled tight and sutured. Firm manual pressure was  still applied for an additional 10 minutes. There was no evidence of  bleeding at that time. The patient tolerated the procedure well. There  were no apparent complications. He was returned to the recovery area in  good condition.         Mary Carmen Ford MD    D: 08/09/2019 12:25:10       T: 08/09/2019 13:26:11     [email or web address]  Job#: [id number]     Doc#: [id number]    CC:

## 2019-08-09 NOTE — CARE COORDINATION
[address] Transitions Interview     2019    Patient: Prince Segovia Patient : 1931   MRN: [id number]  Reason for Admission: Symptomatic Bradycardia     RARS: Readmission Risk Score: 22         Attempted to see patient for F2F assessment, patient currently not in room and in cath lab for placement of PPM. CTN left BPCI-A letter at bedside. Will attempt to see patient at later time.        Readmission Risk  Patient Active Problem List   Diagnosis    Acute encephalopathy    Delirium    Coronary artery disease involving native heart without angina pectoris    Essential hypertension    Chronic kidney disease    Cellulitis of buttock    Atrial fibrillation with slow ventricular response (HCC)    Venous ulcers of bilateral legs    Acute hyperglycemia    GERD (gastroesophageal reflux disease)    BPH (benign prostatic hyperplasia)    Type 2 diabetes mellitus with chronic kidney disease, without long-term current use of insulin ([address]    Hx total left hip arthroplasty (2017)    Chronic atrial fibrillation (HCC)    Frequent falls    Chronically elevated INR    Elevated brain natriuretic peptide (BNP) level    Debility    Former smoker    Disorientation    Somnolence    Infected decubitus ulcer, unstageable ([address]    Abscess    ELEANOR (acute kidney injury) ([address]    Closed fracture of distal end of left radius    Closed nondisplaced fracture of styloid process of left ulna    Scalp laceration    Multiple skin tears    Cellulitis and abscess of leg    HCAP (healthcare-associated pneumonia)    Pneumonia due to organism    Metabolic encephalopathy    Mass of right axilla    Abscess of axilla, right    Moderate protein-calorie malnutrition ([address]    Hypoglycemia    Symptomatic bradycardia       Inpatient Assessment  Care Transitions Summary    Care Transitions Inpatient Review  Medication Review  Housing Review  Social Support  Durable Medical

## 2019-08-09 NOTE — SEDATION DOCUMENTATION
Sedation start time: 1028  Sedation stop time: 1130  Mallampati: 3  Pre and post Karmen score: 10/10  Medication given: IV Versed, IV fentanyl  Pre-Procedure Assessment / Plan:  ASA: Class 2 - A normal healthy patient with mild systemic disease  Level of Sedation Plan: Moderate sedation  Post Procedure plan: Return to same level of care

## 2019-08-09 NOTE — PROGRESS NOTES
ADVANCED CARE PLANNING    Name:Jose Ramon Seymour       :  1931              MRN:  [id number]      Purpose of Encounter: Advanced care planning in light of Dementia, Bradycardia. Parties in attendance: :Fernandez Wall MD, Family members:POA. Decisional Capacity:No    Subjective/Patient Story: Patient understands that his Memory function continues to deteriorate. Patient/POA no longer wishes further curative interventions, including hospitalization, if there is no long term benefit :No  Patient/POA wishes to continue further interventions, patient will re-evaluate at a later time: Yes    Objective/Medical Story: Worsening Dementia. Goals of Care Determinations: Patient wishes to focus on treatment and return to home. Plan: Will notify John Markham MD of change in care plan. Will look at further interventions as needed. Code Status: At this time patient wishes to be Full Code    Time Spent on Advanced Planning Documents: 20+ minutes    Advanced Care Planning Documents: Completed advances directives, advanced directives in chart. Electronically signed by Lucrecia Cuello MD on 2019 at 8:20 AM  Thank you John Markham MD for the opportunity to be involved in this patient's care. If you have any questions or concerns please feel free to contact me at [phone].

## 2019-08-09 NOTE — PROGRESS NOTES
IVF  - avoid nephrotoxic agents  -Clinically remained stable.     Acute metabolic encephalopathy with baseline dementia  - combative at facility today, not normal behavior  - mental status improving with bradycardia treatment  Continue supportive care.     Chronic diastolic heart failure  - appears euvolemic  - last echo 4/17 with EF 55%  - holding home ACE, diuretic  - cardiology following     CAD with mild troponin elevation  - no concern for ACS at present  - will continue to monitor trop trend. EKG without signs of ischemia  - cardiology following.  Defer further cardiac testing.     DMII  - well controlled  - continue home lantus with SSI    DVT Prophylaxis: Lovenox  Diet: DIET CARB CONTROL;  Code Status: Full Code    PT/OT Eval Status: Not ordered    Dispo - 1-2 days    Maxwell Nurse, MD

## 2019-08-10 NOTE — PROGRESS NOTES
810    7:15am  Bedside rounding completed. Patient appears somewhat agitated. Combative when touched or examined and fidgety. Currently in two point restraints (bilat wrists) Palpable pulses. Range of motion performed. Patient offered water, no response. 8:50am   Attempted to feed patient. He continues to shake his head and is unwilling to open his mouth. Will try again in a bit. 8:55am  Dr. Patricia Grey rounded on patient. Patient drowsy but opens his eyes when spoken to. Not engaging in conversation at this time. Dr. Patricia Grey made aware of patients BP which is elevated this mornin/93 (map 121) pulse 80    9:00am  [employer] just saw patient, and examined functionality of pacemaker. Pace maker working properly. They are sending report to Dr. Sarah Mendez    9:05am  Patient cooperated in taking his medication (asa and protonix) with sip of water. He was initially noted pocketing pill, took multiple sips of water to get it down. Continues to refuse breakfast.     10:45am   Range of motion performed on restrained arms. IV in left wrist noted to have new bloody drainage. While attempting to assess the site, patient became combative, causing the IV to dislodge completely. Patient also appears to be hallucinating. He's reaching for objects that aren't there, and mimicking feeding himself food that is not there. Stanislav served Dr. Patricia Grey following msg. .. \"FYI patient no longer has IV access, removed by patient. He is becoming increasingly agitated and combative, and appears to be hallucinating. Requesting an order for an IM medication to help calm him down, I can then attempt to regain IV access. Family currently at the bedside. \"    6:30pm  Patient increasingly agitated and combative. Attempting to punch and kick staff. Given Haldol IM. Will continue to monitor. 6:55pm  Stanislav served MD following msg. .. \"Patients /79 (map 118) pulse 83.  Was admitted with bradycardia (HR 30s) Patient doesn't currently have IV access (he pulled it out) I just gave Haldol,  then I'm going to attempt reestablishing IV access. Do you want to order any prn BP medication? \"    7:05pm   Po Norvasc 5mg ordered for patient. Waiting for pharmacy to verify medication. Still without IV access, Patient still very combative, despite receiving Haldol at 6:30pm.     7:15pm   Bedside report given to [facility], [facility].

## 2019-08-10 NOTE — PROGRESS NOTES
[address]   Daily Progress Note    Admit Date:  8/7/2019  HPI:    Chief Complaint   Patient presents with    Altered Mental Status     from the [facility], combative, normally interactive, now he be silent. pinpoint pupils.  Bradycardia     Rate 30 per nurse at [address], given atropine then rate 66. Cardiology consulted for bradycardia, Afib 30's, no reversible cause of bradycardia. S/p Micra leadless pacemaker on 8/9/19. Subjective:  Mr. Alejandra Right confused, unable to verbalize any responses. Medical assistant reports this is a change from yesterday. Objective:   /88   Pulse 65   Temp 98 °F (36.7 °C) (Oral)   Resp 15   Wt 215 lb (97.5 kg)   SpO2 96%   BMI 28.37 kg/m²       Intake/Output Summary (Last 24 hours) at 8/10/2019 3586  Last data filed at 8/9/2019 2054  Gross per 24 hour   Intake 300 ml   Output 800 ml   Net -500 ml     Wt Readings from Last 3 Encounters:   08/10/19 215 lb (97.5 kg)   05/22/19 224 lb 12.8 oz (102 kg)   03/17/19 253 lb 15.5 oz (115.2 kg)         ASSESSMENT:   1. Bradycardia with altered mental status - s/p Micra leadless ventricular pacemaker  2. Afib, chronic - rate juice, not on anticoagulation due to hx of \"life threatening GI hemorrhage\" - Dr. Aguilar  3. CHF, chronic diastolic - no signs of fluid overload, ACE and diuretics being held  4. Acute on CKD - stable, at his baseline  5. Elevated troponin - flat, not consistent with ACS, ECG without ischemia, offers no complains of angina, + hx of CAD, on ASA and statin  6. Altered mental status with hx of dementia - unclear what his baseline status is, defer to primary MD/ IM      PLAN:  1. CXR pending  2. Defer mental status evaluation to primary service  3.  Consider echo      KAILEE Argueta - CNP, 8/10/2019, 8:23 AM  [address]   [phone]       Telemetry: V paced with underlying Afib 60-70's, isolated PVC's  NYHA: III    Physical Exam:  General:  Awake, alert, NAD  Skin:  Warm and

## 2019-08-11 NOTE — PROGRESS NOTES
pacing threshold 0.38 volts at 0.24 milliseconds. Echo April 2017:   Normal left ventricular systolic function with an estimated ejection   fraction of 55%. The right atrium is mildly dilated. Mild mitral annular calcification. Mild mitral regurgitation. Moderate tricuspid regurgitation. Systolic pulmonary artery pressure (SPAP) is estimated at 56 mmHg consistent with moderate pulmonary hypertension (RA pressure 15 mmHg).

## 2019-08-12 NOTE — PROGRESS NOTES
Patient continues to be uncooperative and combative during attempts at general care. Sitter remains at bedside. Will continue to monitor.

## 2019-08-12 NOTE — PROGRESS NOTES
Hospitalist Progress Note      PCP: Bernie Tirado MD    Date of Admission: 8/7/2019    Chief Complaint: Altered mental state, syncope    Hospital Course:   See H&P    Subjective:   Patient is up in bed, comfortable, not in distress. Patient is somewhat confused. Agitated off and on. Denies any chest pain or shortness of breath. No new event overnight noted. Medications:  Reviewed    Infusion Medications    dextrose       Scheduled Medications    LORazepam  1 mg Intravenous Once    amLODIPine  5 mg Oral Daily    aspirin  81 mg Oral Daily    insulin glargine  6 Units Subcutaneous Nightly    pantoprazole  40 mg Oral Daily    rosuvastatin  5 mg Oral Nightly    sodium chloride flush  10 mL Intravenous 2 times per day    enoxaparin  40 mg Subcutaneous Daily    insulin lispro  0-6 Units Subcutaneous TID WC    insulin lispro  0-3 Units Subcutaneous Nightly     PRN Meds: hydrALAZINE, acetaminophen, sodium chloride flush, magnesium hydroxide, ondansetron, glucose, dextrose, glucagon (rDNA), dextrose      Intake/Output Summary (Last 24 hours) at 8/12/2019 1147  Last data filed at 8/12/2019 1771  Gross per 24 hour   Intake 0 ml   Output --   Net 0 ml       Physical Exam Performed:    BP (!) 183/85 Comment: prn IV Hydralazine givne for sbp>180   Pulse 89   Temp 98 °F (36.7 °C) (Axillary)   Resp 20   Wt 172 lb 6.4 oz (78.2 kg)   SpO2 98%   BMI 22.75 kg/m²     General appearance: No apparent distress, appears confused. HEENT: Pupils equal, round, and reactive to light. Conjunctivae/corneas clear. Neck: Supple, with full range of motion. No jugular venous distention. Trachea midline. Respiratory:  Normal respiratory effort. Clear to auscultation, bilaterally without Rales/Wheezes/Rhonchi. Cardiovascular: Regular rate and rhythm with normal S1/S2 without murmurs, rubs or gallops. Abdomen: Soft, non-tender, non-distended with normal bowel sounds.   Musculoskeletal: No clubbing, cyanosis or edema No

## 2019-08-12 NOTE — PROGRESS NOTES
Follow-up visit by cath lab staff. Pt sleeping soundly at this time with sitter present at bedside. Pt noted to be agitated and violent while awake, so pt was not woken up for visit. Per pt's RN, access site from cath procedure on 8/9 is WNL and pt/family have not verbalized any additional questions.

## 2019-08-13 PROBLEM — G30.1 LATE ONSET ALZHEIMER'S DISEASE WITH BEHAVIORAL DISTURBANCE (HCC): Chronic | Status: ACTIVE | Noted: 2019-01-01

## 2019-08-13 PROBLEM — F02.818 LATE ONSET ALZHEIMER'S DISEASE WITH BEHAVIORAL DISTURBANCE (HCC): Chronic | Status: ACTIVE | Noted: 2019-01-01

## 2019-08-13 NOTE — CONSULTS
out of restraints for 24 hours and the [facility] is requesting that he be restraint free for 24 hours. Psychiatric Hx: Hosp: none  Tx: none known  Abuse History: no alcohol use, no  MJ, Coke, Opiod, no physical or sexual abuse or perpetration,   Social Hx: family Living situation: nursing home, education: ALKA, financial ALKA, legal ALKA, spiritual ALKA,    MSE: Mental Status Examination:  Level of consciousness:  somnolent  Appearance:  lying in bed well-developed, well-nourished  Behavior/Motor:  no abnormalities noted in  Bed   Attitude toward examiner:  asleep  Speech:  ALKA  Mood:  ALKA  Affect:  Asleep  Hallucinations: ALKA  Thought processes:  ALKA Attention:  ALKA  Thought content:  ALKA Abstraction: ALKA  OCD: ALKA  Insight: ALKA  Judgement: [address] of Knowledge: UTAIQ:ALKA Memory: ALKA  Cognition:  ALKA  Concentration ALKA  Memory ALKA  Suicide:ALKA  Sleep: ALKA  Appetite: ALKA  Inventory of strengths and weaknesses:Caregiver support  PE: VITALS:  BP (!) 146/79   Pulse 97   Temp 98.1 °F (36.7 °C) (Axillary)   Resp 12   Wt 168 lb 14 oz (76.6 kg)   SpO2 99%   BMI 22.28 kg/m²     Cranial Nerves: 1-12 appear to be intact ALKA  ROS:  Reviewed ED and Med notes and agree with findings  Labs:    No results displayed because visit has over 200 results. Impression: 81 yo with dementia, bradycardia, afib,ELEANOR, CHF, DM, who is more aggressive and combative.  He has a baseline dementia but reportedly not typpically aggressive  Dx: axis I: Dementia with  behavioral disturbance   Axis 2: No diagnosis  [place] 3: See Medical History  Axis 4: no problems  Axis 5: 41-50 serious symptoms         Active Hospital Problems    Diagnosis Date Noted    Late onset Alzheimer's disease with behavioral disturbance [G30.1, F02.81] 08/13/2019    S/P placement of cardiac pacemaker [Z95.0]     Chronic diastolic congestive heart failure (HCC) [I50.32]     Symptomatic bradycardia [R00.1] 08/07/2019    Elevated troponin [R74.8] 09/26/2017

## 2019-08-13 NOTE — PROGRESS NOTES
sounds. Musculoskeletal: No clubbing, cyanosis or edema bilaterally. Full range of motion without deformity. Skin: Skin color, texture, turgor normal.  No rashes or lesions. Neurologic:  Neurovascularly intact without any focal sensory/motor deficits. Cranial nerves: II-XII intact, grossly non-focal.  Psychiatric: Alert and oriented, confused  Capillary Refill: Brisk,< 3 seconds   Peripheral Pulses: +2 palpable, equal bilaterally       Labs:   Recent Labs     08/13/19  0515   WBC 8.5   HGB 14.0   HCT 41.7   *     Recent Labs     08/11/19  0452 08/12/19  0458 08/13/19  0520    140 140   K 4.3 4.3 3.6    104 106   CO2 17* 17* 21   BUN 21* 22* 29*   CREATININE 1.6* 1.6* 1.8*   CALCIUM 9.3 9.5 9.8       Urinalysis:      Lab Results   Component Value Date    NITRU Negative 08/07/2019    WBCUA 6-10 08/07/2019    BACTERIA 1+ 07/10/2017    RBCUA 0-2 08/07/2019    BLOODU Negative 08/07/2019    SPECGRAV <=1.005 08/07/2019    GLUCOSEU Negative 08/07/2019    GLUCOSEU 250 mg/dL 05/22/2010       Radiology:  CT Head WO Contrast   Final Result   No acute intracranial abnormality. Age-appropriate atrophy and small-vessel   disease ischemic changes redemonstrated. XR CHEST PORTABLE   Final Result   1. Interstitial edema with small bilateral pleural effusions. 2. Stable mild enlargement of the cardiac silhouette. Assessment/Plan:    Active Hospital Problems    Diagnosis    Altered mental status [R41.82]    S/P placement of cardiac pacemaker [Z95.0]    Chronic diastolic congestive heart failure (HCC) [I50.32]    Symptomatic bradycardia [R00.1]    Elevated troponin [R74.8]     Symptomatic bradycardia with associated afib  - bradycardia improved with atropine X2  - no clear cause seen. Not on rate control as outpatient  - no AC at this time  -cardiology consulted, input is pending. Patient remained hemodynamically stable. Status post pacemaker placement on 8/9/2019.

## 2019-08-14 NOTE — CARE COORDINATION
Patient's POA requesting to speak to CM. Placed call to Beck Yeh. She states that following her conversation with Jason Elizabeth, Palliative Nurse, yesterday, she would like a referral to be made to [facility] [place] [address] due to they have an inpatient option should that become a necessity. Placed call to Marielos with [facility] and notified of referral. Facesheet faxed through [employer].

## 2019-08-14 NOTE — PROGRESS NOTES
stable. Status post pacemaker placement on 8/9/2019. Hemodynamically remained stable.     ELEANOR with known CKD  - unclear etiology. Possibly hypoperfusion from bradycardia  - baseline Cr around 1.5  - avoid nephrotoxic agents  -Clinically remained stable.     Acute metabolic encephalopathy with baseline dementia  - combative at facility today, not normal behavior  - mental status improving with bradycardia treatment  Continue supportive care. Patient was more agitated overnight, poor p.o. intake and compliance. Will get psych consult for behavioral issues. Poor p.o. intake, for long-term goal palliative care consulted. Discussion ongoing with POA.     Chronic diastolic heart failure  - appears euvolemic  - last echo 4/17 with EF 55%  - holding home ACE, diuretic  - cardiology following     CAD with mild troponin elevation  - no concern for ACS at present  - will continue to monitor trop trend. EKG without signs of ischemia  - cardiology following. Defer further cardiac testing.     DMII  - well controlled  - continue home lantus with SSI    DVT Prophylaxis: Lovenox  Diet: DIET CARB CONTROL;  Code Status: Limited    PT/OT Eval Status: Not ordered    Dispo -medically stable for discharge.     Melinda Coburn MD

## 2019-08-15 NOTE — PROGRESS NOTES
Pt discharged to [facility]. Report given over telephone to nurse Dela Cruz. Picked up by ambulance. All belongings given to transport.

## 2019-08-15 NOTE — DISCHARGE SUMMARY
ischemia  - cardiology following. Defer further cardiac testing.     DMII  - well controlled  - continue home lantus with SSI     Poor p.o. intake, patient is more agitated off and on and aggressive, had been on soft restraint for last to 3 days, psych input noted, palliative care consulted, more likely comfort measures with hospice. Physical Exam Performed:     /83   Pulse 88   Temp 98.2 °F (36.8 °C) (Axillary)   Resp 18   Wt 151 lb 0.2 oz (68.5 kg)   SpO2 98%   BMI 19.92 kg/m²       General appearance:  No apparent distress, appears stated age and cooperative. HEENT:  Normal cephalic, atraumatic without obvious deformity. Pupils equal, round, and reactive to light. Extra ocular muscles intact. Conjunctivae/corneas clear. Neck: Supple, with full range of motion. No jugular venous distention. Trachea midline. Respiratory:  Normal respiratory effort. Clear to auscultation, bilaterally without Rales/Wheezes/Rhonchi. Cardiovascular:  Regular rate and rhythm with normal S1/S2 without murmurs, rubs or gallops. Abdomen: Soft, non-tender, non-distended with normal bowel sounds. Musculoskeletal:  No clubbing, cyanosis or edema bilaterally. Full range of motion without deformity. Skin: Skin color, texture, turgor normal.  No rashes or lesions. Neurologic:  Neurovascularly intact without any focal sensory/motor deficits. Cranial nerves: II-XII intact, grossly non-focal.  Psychiatric:  Alert    Capillary Refill: Brisk,< 3 seconds   Peripheral Pulses: +2 palpable, equal bilaterally       Labs:  For convenience and continuity at follow-up the following most recent labs are provided:      CBC:    Lab Results   Component Value Date    WBC 8.5 08/13/2019    HGB 14.0 08/13/2019    HCT 41.7 08/13/2019     08/13/2019       Renal:    Lab Results   Component Value Date     08/15/2019    K 3.6 08/15/2019    K 4.5 08/08/2019     08/15/2019    CO2 19 08/15/2019    BUN 33 08/15/2019    CREATININE

## 2019-08-15 NOTE — FLOWSHEET NOTE
08/07/19 1844   Vital Signs   Temp 97.7 °F (36.5 °C)   Temp Source Oral   Pulse 65   Resp 14   /62   BP Location Right upper arm   MAP (mmHg) 87   Patient Position Semi fowlers   Level of Consciousness 0   MEWS Score 0   Patient Currently in Pain Denies   Oxygen Therapy   SpO2 95 %   O2 Device None (Room air)   Patient admitted to C4 from ER for sinus bradycardia.  Full assessment to follow
08/15/19 0900   Assessment   Charting Type Shift assessment   Neurological   Neuro (WDL) X   Level of Consciousness 0   Orientation Level Disoriented X4   Cognition Impulsive; Follows commands   Language Slurred   [place] Coma Scale   Eye Opening 4   Best Verbal Response 4   Best Motor Response 6   [place] Coma Scale Score 14   HEENT   HEENT (WDL) X   Right Ear Impaired hearing   Left Ear Impaired hearing   Teeth Missing teeth   Respiratory   Respiratory (WDL) X   Respiratory Pattern Regular   Respiratory Depth Normal   Respiratory Quality/Effort Unlabored   Chest Assessment Chest expansion symmetrical;Trachea midline   L Breath Sounds Diminished   R Breath Sounds Diminished   Cardiac   Cardiac (WDL) WDL   Cardiac Regularity Regular   Heart Sounds S1, S2   Pacemaker   Pacemaker Yes   Pacemaker Type Permanent   Gastrointestinal   Abdominal (WDL) X   RUQ Bowel Sounds Hypoactive   LUQ Bowel Sounds Hypoactive   RLQ Bowel Sounds Hypoactive   LLQ Bowel Sounds Hypoactive   Peripheral Vascular   Peripheral Vascular (WDL) X   Edema Right lower extremity; Left lower extremity   RLE Edema Trace   LLE Edema Trace   Skin Color/Condition   Skin Color/Condition (WDL) X   Skin Color Red;Rodriguez;Ecchymosis   Skin Condition/Temp Warm;Poor turgor   Skin Integrity   Skin Integrity (WDL) X   Skin Integrity Bruising; Redness   Location scattered   Skin Integrity Site 2   Skin Integrity Location 2 Redness   Location 2 coccyx   Skin Integrity Site 3   Skin Integrity Location 3 Redness    Location 3 heels   Skin Integrity Site 4   Skin Integrity Location 4 Abrasion;Bruising   Location 4 scattered   Musculoskeletal   Musculoskeletal (WDL) X   RUE Weakness   LUE Weakness   RL Extremity Weakness   LL Extremity Weakness   Genitourinary   Genitourinary (WDL) X   Flank Tenderness ALKA   Suprapubic Tenderness ALKA   Dysuria ALKA   Urine Assessment   Incontinence Yes   Anus/Rectum   Anus/Rectum (WDL) WDL   Wound 08/07/19 Pretibial Right; Anterior; Lower
Bed: ED11  Expected date:   Expected time:   Means of arrival:   Comments:  BV1  
Integrity Sites Yes   Skin Integrity Site 2   Skin Integrity Location 2 Redness   Location 2 BLE    Skin Integrity Site 3   Skin Integrity Location 3 Redness    Location 3 bilateral heels   Skin Integrity Site 4   Skin Integrity Location 4 Abrasion;Bruising   Location 4 scattered   Musculoskeletal   Musculoskeletal (WDL) X   RUE Full movement   LUE Full movement   RL Extremity Weakness   LL Extremity Weakness   Genitourinary   Genitourinary (WDL) X  (incontinent)   Flank Tenderness No   Suprapubic Tenderness No   Dysuria No   Urine Assessment   Incontinence Yes   Urine Color Yellow/straw   Urine Appearance ALKA   Urine Odor No odor   Anus/Rectum   Anus/Rectum (WDL) WDL   Wound 08/07/19 Pretibial Right; Anterior; Lower scattered and excoritated dry brown scabs through out lower anterior leg. NO need to measure per Loreda Marquis   Date First Assessed/Time First Assessed: 08/07/19 2200   Present on Hospital Admission: Yes  Primary Wound Type: Venous Ulcer  Location: Pretibial  Wound Location Orientation: Right; Anterior; Lower  Wound Description (Comments): scattered and excoritat. .. Dressing/Treatment Open to air   Wound Assessment Pink;Red   Drainage Amount None   Odor None   Wound 08/07/19 Pretibial Left; Anterior; Lateral scattered and excoritated dry brown scabs through out lower anterior leg. Dry non draining red ulcer left lateral leg x 2. NO need to measure per Loreda Marquis   Date First Assessed/Time First Assessed: 08/07/19 2200   Present on Hospital Admission: Yes  Primary Wound Type: Venous Ulcer  Location: Pretibial  Wound Location Orientation: Left; Anterior; Lateral  Wound Description (Comments): scattered and excorita. .. Dressing/Treatment Open to air   Wound Assessment Dry;Black   Drainage Amount None   Odor None   Margins Attached edges; Defined edges   Red%Wound Bed 30   Psychosocial   Psychosocial (WDL) X  (confused)   Patient Behaviors Non-Compliant; Anxious; Aggressive physically;Aggressive

## 2021-05-31 NOTE — PLAN OF CARE
Pt is not presently able to be reoriented so safety is a cont. Heightened concern  AVASYS cont.  at bedside Pt requiring calvin WR to decrease his aggressive,unsafe behavior pt with c/o trouble breathing for past couple days. hx COPD

## (undated) DEVICE — MAJOR SET UP PK

## (undated) DEVICE — 3M™ STERI-STRIP™ COMPOUND BENZOIN TINCTURE 40 BAGS/CARTON 4 CARTONS/CASE C1544: Brand: 3M™ STERI-STRIP™

## (undated) DEVICE — CANNULA NSL 13FT TUBE AD ETCO2 DIV SAMP M

## (undated) DEVICE — ELECTRODE PT RET AD L9FT HI MOIST COND ADH HYDRGEL CORDED

## (undated) DEVICE — SYRINGE MED 10ML LUERLOCK TIP W/O SFTY DISP

## (undated) DEVICE — GAUZE,SPONGE,4"X4",8PLY,STRL,LF,10/TRAY: Brand: MEDLINE

## (undated) DEVICE — GLOVE ORANGE PI 8 1/2   MSG9085

## (undated) DEVICE — TIP SUCT DIA12FR W STYL CTRL VENT DISPOSABLE FRAZ

## (undated) DEVICE — SUTURE VCRL SZ 4-0 L18IN ABSRB UD L19MM PS-2 3/8 CIR PRIM J496H

## (undated) DEVICE — NEEDLE HYPO 25GA L1.5IN BLU POLYPR HUB S STL REG BVL STR

## (undated) DEVICE — APPLICATOR PREP 26ML 0.7% IOD POVACRYLEX 74% ISO ALC ST

## (undated) DEVICE — GOWN,AURORA,NONREINF,RAGLAN,XXL,STERILE: Brand: MEDLINE

## (undated) DEVICE — SUTURE VCRL SZ 3-0 L18IN ABSRB UD L26MM SH 1/2 CIR J864D